# Patient Record
Sex: FEMALE | Race: WHITE | NOT HISPANIC OR LATINO | Employment: UNEMPLOYED | URBAN - METROPOLITAN AREA
[De-identification: names, ages, dates, MRNs, and addresses within clinical notes are randomized per-mention and may not be internally consistent; named-entity substitution may affect disease eponyms.]

---

## 2018-07-13 ENCOUNTER — HOSPITAL ENCOUNTER (INPATIENT)
Facility: HOSPITAL | Age: 63
LOS: 8 days | Discharge: HOME HEALTH | DRG: 356 | End: 2018-07-21
Attending: EMERGENCY MEDICINE | Admitting: HOSPITALIST
Payer: OTHER GOVERNMENT

## 2018-07-13 DIAGNOSIS — R06.02 SHORTNESS OF BREATH: ICD-10-CM

## 2018-07-13 DIAGNOSIS — I67.1 BRAIN ANEURYSM: ICD-10-CM

## 2018-07-13 DIAGNOSIS — E87.5 HYPERKALEMIA: ICD-10-CM

## 2018-07-13 DIAGNOSIS — M54.9 BACK PAIN: ICD-10-CM

## 2018-07-13 DIAGNOSIS — R53.83 LETHARGIC: ICD-10-CM

## 2018-07-13 DIAGNOSIS — D68.61 ANTIPHOSPHOLIPID SYNDROME: ICD-10-CM

## 2018-07-13 DIAGNOSIS — S37.019A PERINEPHRIC HEMATOMA: Primary | ICD-10-CM

## 2018-07-13 PROBLEM — E03.9 HYPOTHYROIDISM: Status: ACTIVE | Noted: 2018-07-13

## 2018-07-13 PROBLEM — R10.9 FLANK PAIN: Status: ACTIVE | Noted: 2018-07-13

## 2018-07-13 LAB
ABO + RH BLD: NORMAL
ALBUMIN SERPL BCP-MCNC: 2.5 G/DL
ALBUMIN SERPL BCP-MCNC: 2.6 G/DL
ALBUMIN SERPL BCP-MCNC: 2.9 G/DL
ALLENS TEST: ABNORMAL
ALP SERPL-CCNC: 82 U/L
ALP SERPL-CCNC: 86 U/L
ALP SERPL-CCNC: 94 U/L
ALT SERPL W/O P-5'-P-CCNC: 43 U/L
ALT SERPL W/O P-5'-P-CCNC: 56 U/L
ALT SERPL W/O P-5'-P-CCNC: 56 U/L
ANION GAP SERPL CALC-SCNC: 10 MMOL/L
ANION GAP SERPL CALC-SCNC: 12 MMOL/L
ANION GAP SERPL CALC-SCNC: 9 MMOL/L
APTT BLDCRRT: 56.8 SEC
AST SERPL-CCNC: 58 U/L
AST SERPL-CCNC: 80 U/L
AST SERPL-CCNC: 84 U/L
BACTERIA #/AREA URNS AUTO: ABNORMAL /HPF
BASOPHILS # BLD AUTO: 0.02 K/UL
BASOPHILS # BLD AUTO: 0.03 K/UL
BASOPHILS # BLD AUTO: 0.03 K/UL
BASOPHILS NFR BLD: 0.1 %
BASOPHILS NFR BLD: 0.1 %
BASOPHILS NFR BLD: 0.2 %
BILIRUB SERPL-MCNC: 1.1 MG/DL
BILIRUB SERPL-MCNC: 1.7 MG/DL
BILIRUB SERPL-MCNC: 1.7 MG/DL
BILIRUB UR QL STRIP: NEGATIVE
BLD GP AB SCN CELLS X3 SERPL QL: NORMAL
BLD PROD TYP BPU: NORMAL
BLOOD UNIT EXPIRATION DATE: NORMAL
BLOOD UNIT TYPE CODE: 5100
BLOOD UNIT TYPE CODE: 5100
BLOOD UNIT TYPE CODE: 600
BLOOD UNIT TYPE: NORMAL
BUN SERPL-MCNC: 21 MG/DL
BUN SERPL-MCNC: 23 MG/DL
BUN SERPL-MCNC: 25 MG/DL
CA-I BLDV-SCNC: 1.07 MMOL/L
CALCIUM SERPL-MCNC: 8 MG/DL
CALCIUM SERPL-MCNC: 8 MG/DL
CALCIUM SERPL-MCNC: 8.1 MG/DL
CHLORIDE SERPL-SCNC: 112 MMOL/L
CHLORIDE SERPL-SCNC: 113 MMOL/L
CHLORIDE SERPL-SCNC: 116 MMOL/L
CLARITY UR REFRACT.AUTO: ABNORMAL
CO2 SERPL-SCNC: 12 MMOL/L
CO2 SERPL-SCNC: 18 MMOL/L
CO2 SERPL-SCNC: 18 MMOL/L
CODING SYSTEM: NORMAL
COLOR UR AUTO: ABNORMAL
CREAT SERPL-MCNC: 1.2 MG/DL
CREAT SERPL-MCNC: 1.4 MG/DL
CREAT SERPL-MCNC: 1.6 MG/DL
DELSYS: ABNORMAL
DIFFERENTIAL METHOD: ABNORMAL
DISPENSE STATUS: NORMAL
EOSINOPHIL # BLD AUTO: 0 K/UL
EOSINOPHIL NFR BLD: 0 %
ERYTHROCYTE [DISTWIDTH] IN BLOOD BY AUTOMATED COUNT: 12.7 %
ERYTHROCYTE [DISTWIDTH] IN BLOOD BY AUTOMATED COUNT: 12.8 %
ERYTHROCYTE [DISTWIDTH] IN BLOOD BY AUTOMATED COUNT: 12.9 %
ERYTHROCYTE [SEDIMENTATION RATE] IN BLOOD BY WESTERGREN METHOD: 28 MM/H
EST. GFR  (AFRICAN AMERICAN): 39.8 ML/MIN/1.73 M^2
EST. GFR  (AFRICAN AMERICAN): 46.8 ML/MIN/1.73 M^2
EST. GFR  (AFRICAN AMERICAN): 56.4 ML/MIN/1.73 M^2
EST. GFR  (NON AFRICAN AMERICAN): 34.5 ML/MIN/1.73 M^2
EST. GFR  (NON AFRICAN AMERICAN): 40.6 ML/MIN/1.73 M^2
EST. GFR  (NON AFRICAN AMERICAN): 48.9 ML/MIN/1.73 M^2
ESTIMATED AVG GLUCOSE: 105 MG/DL
FIO2: 21
GLUCOSE SERPL-MCNC: 175 MG/DL
GLUCOSE SERPL-MCNC: 206 MG/DL
GLUCOSE SERPL-MCNC: 227 MG/DL
GLUCOSE UR QL STRIP: ABNORMAL
HBA1C MFR BLD HPLC: 5.3 %
HCO3 UR-SCNC: 13.4 MMOL/L (ref 24–28)
HCT VFR BLD AUTO: 29 %
HCT VFR BLD AUTO: 32.5 %
HCT VFR BLD AUTO: 35.1 %
HGB BLD-MCNC: 10.7 G/DL
HGB BLD-MCNC: 11 G/DL
HGB BLD-MCNC: 9.7 G/DL
HGB UR QL STRIP: ABNORMAL
HYALINE CASTS UR QL AUTO: 38 /LPF
IMM GRANULOCYTES # BLD AUTO: 0.09 K/UL
IMM GRANULOCYTES # BLD AUTO: 0.25 K/UL
IMM GRANULOCYTES # BLD AUTO: 0.27 K/UL
IMM GRANULOCYTES NFR BLD AUTO: 0.5 %
IMM GRANULOCYTES NFR BLD AUTO: 1.1 %
IMM GRANULOCYTES NFR BLD AUTO: 1.3 %
INR PPP: 2.7
INR PPP: 4.4
INR PPP: 5
KETONES UR QL STRIP: NEGATIVE
LDH SERPL L TO P-CCNC: 3.2 MMOL/L (ref 0.36–1.25)
LEUKOCYTE ESTERASE UR QL STRIP: NEGATIVE
LYMPHOCYTES # BLD AUTO: 1.7 K/UL
LYMPHOCYTES # BLD AUTO: 1.7 K/UL
LYMPHOCYTES # BLD AUTO: 1.9 K/UL
LYMPHOCYTES NFR BLD: 11.2 %
LYMPHOCYTES NFR BLD: 7.5 %
LYMPHOCYTES NFR BLD: 8.2 %
MAGNESIUM SERPL-MCNC: 1.8 MG/DL
MCH RBC QN AUTO: 28.6 PG
MCH RBC QN AUTO: 29.5 PG
MCH RBC QN AUTO: 29.5 PG
MCHC RBC AUTO-ENTMCNC: 31.3 G/DL
MCHC RBC AUTO-ENTMCNC: 32.9 G/DL
MCHC RBC AUTO-ENTMCNC: 33.4 G/DL
MCV RBC AUTO: 88 FL
MCV RBC AUTO: 90 FL
MCV RBC AUTO: 91 FL
MICROSCOPIC COMMENT: ABNORMAL
MODE: ABNORMAL
MONOCYTES # BLD AUTO: 1.1 K/UL
MONOCYTES # BLD AUTO: 1.2 K/UL
MONOCYTES # BLD AUTO: 2 K/UL
MONOCYTES NFR BLD: 5.6 %
MONOCYTES NFR BLD: 6.7 %
MONOCYTES NFR BLD: 9 %
NEUTROPHILS # BLD AUTO: 13.6 K/UL
NEUTROPHILS # BLD AUTO: 18 K/UL
NEUTROPHILS # BLD AUTO: 18.3 K/UL
NEUTROPHILS NFR BLD: 81.4 %
NEUTROPHILS NFR BLD: 82.3 %
NEUTROPHILS NFR BLD: 84.8 %
NITRITE UR QL STRIP: NEGATIVE
NRBC BLD-RTO: 0 /100 WBC
NUM UNITS TRANS FFP: NORMAL
NUM UNITS TRANS PACKED RBC: NORMAL
NUM UNITS TRANS PACKED RBC: NORMAL
PCO2 BLDA: 26 MMHG (ref 35–45)
PH SMN: 7.32 [PH] (ref 7.35–7.45)
PH UR STRIP: 5 [PH] (ref 5–8)
PHOSPHATE SERPL-MCNC: 3.5 MG/DL
PLATELET # BLD AUTO: 202 K/UL
PLATELET # BLD AUTO: 221 K/UL
PLATELET # BLD AUTO: 259 K/UL
PMV BLD AUTO: 11 FL
PMV BLD AUTO: 11.1 FL
PMV BLD AUTO: 11.3 FL
PO2 BLDA: 74 MMHG (ref 80–100)
POC BE: -13 MMOL/L
POC SATURATED O2: 94 % (ref 95–100)
POC TCO2: 14 MMOL/L (ref 23–27)
POCT GLUCOSE: 248 MG/DL (ref 70–110)
POTASSIUM SERPL-SCNC: 4.7 MMOL/L
POTASSIUM SERPL-SCNC: 5.4 MMOL/L
POTASSIUM SERPL-SCNC: 5.7 MMOL/L
PROT SERPL-MCNC: 5.3 G/DL
PROT SERPL-MCNC: 5.3 G/DL
PROT SERPL-MCNC: 6 G/DL
PROT UR QL STRIP: ABNORMAL
PROTHROMBIN TIME: 25.8 SEC
PROTHROMBIN TIME: 43 SEC
PROTHROMBIN TIME: 49.3 SEC
RBC # BLD AUTO: 3.29 M/UL
RBC # BLD AUTO: 3.63 M/UL
RBC # BLD AUTO: 3.84 M/UL
RBC #/AREA URNS AUTO: 6 /HPF (ref 0–4)
SAMPLE: ABNORMAL
SITE: ABNORMAL
SODIUM SERPL-SCNC: 139 MMOL/L
SODIUM SERPL-SCNC: 140 MMOL/L
SODIUM SERPL-SCNC: 141 MMOL/L
SP GR UR STRIP: 1.03 (ref 1–1.03)
SP02: 96
T4 FREE SERPL-MCNC: 1.05 NG/DL
TROPONIN I SERPL DL<=0.01 NG/ML-MCNC: <0.006 NG/ML
TSH SERPL DL<=0.005 MIU/L-ACNC: 0.13 UIU/ML
URN SPEC COLLECT METH UR: ABNORMAL
UROBILINOGEN UR STRIP-ACNC: NEGATIVE EU/DL
WBC # BLD AUTO: 16.68 K/UL
WBC # BLD AUTO: 21.25 K/UL
WBC # BLD AUTO: 22.17 K/UL
WBC #/AREA URNS AUTO: 2 /HPF (ref 0–5)

## 2018-07-13 PROCEDURE — 86920 COMPATIBILITY TEST SPIN: CPT

## 2018-07-13 PROCEDURE — 85025 COMPLETE CBC W/AUTO DIFF WBC: CPT

## 2018-07-13 PROCEDURE — 20000000 HC ICU ROOM

## 2018-07-13 PROCEDURE — 93005 ELECTROCARDIOGRAM TRACING: CPT

## 2018-07-13 PROCEDURE — 84443 ASSAY THYROID STIM HORMONE: CPT

## 2018-07-13 PROCEDURE — 94761 N-INVAS EAR/PLS OXIMETRY MLT: CPT

## 2018-07-13 PROCEDURE — 80053 COMPREHEN METABOLIC PANEL: CPT | Mod: 91

## 2018-07-13 PROCEDURE — 80048 BASIC METABOLIC PNL TOTAL CA: CPT

## 2018-07-13 PROCEDURE — 87040 BLOOD CULTURE FOR BACTERIA: CPT

## 2018-07-13 PROCEDURE — P9016 RBC LEUKOCYTES REDUCED: HCPCS

## 2018-07-13 PROCEDURE — 25000003 PHARM REV CODE 250: Performed by: STUDENT IN AN ORGANIZED HEALTH CARE EDUCATION/TRAINING PROGRAM

## 2018-07-13 PROCEDURE — 36600 WITHDRAWAL OF ARTERIAL BLOOD: CPT

## 2018-07-13 PROCEDURE — 82330 ASSAY OF CALCIUM: CPT

## 2018-07-13 PROCEDURE — 83735 ASSAY OF MAGNESIUM: CPT

## 2018-07-13 PROCEDURE — 83036 HEMOGLOBIN GLYCOSYLATED A1C: CPT

## 2018-07-13 PROCEDURE — 84100 ASSAY OF PHOSPHORUS: CPT

## 2018-07-13 PROCEDURE — 27000221 HC OXYGEN, UP TO 24 HOURS

## 2018-07-13 PROCEDURE — 99223 1ST HOSP IP/OBS HIGH 75: CPT | Mod: ,,, | Performed by: HOSPITALIST

## 2018-07-13 PROCEDURE — 63600175 PHARM REV CODE 636 W HCPCS: Performed by: STUDENT IN AN ORGANIZED HEALTH CARE EDUCATION/TRAINING PROGRAM

## 2018-07-13 PROCEDURE — 96374 THER/PROPH/DIAG INJ IV PUSH: CPT

## 2018-07-13 PROCEDURE — 63600175 PHARM REV CODE 636 W HCPCS: Performed by: EMERGENCY MEDICINE

## 2018-07-13 PROCEDURE — 84439 ASSAY OF FREE THYROXINE: CPT

## 2018-07-13 PROCEDURE — 82803 BLOOD GASES ANY COMBINATION: CPT

## 2018-07-13 PROCEDURE — 96375 TX/PRO/DX INJ NEW DRUG ADDON: CPT

## 2018-07-13 PROCEDURE — 81001 URINALYSIS AUTO W/SCOPE: CPT

## 2018-07-13 PROCEDURE — 93010 ELECTROCARDIOGRAM REPORT: CPT | Mod: 76,,, | Performed by: INTERNAL MEDICINE

## 2018-07-13 PROCEDURE — 85610 PROTHROMBIN TIME: CPT | Mod: 91

## 2018-07-13 PROCEDURE — P9017 PLASMA 1 DONOR FRZ W/IN 8 HR: HCPCS

## 2018-07-13 PROCEDURE — 84484 ASSAY OF TROPONIN QUANT: CPT

## 2018-07-13 PROCEDURE — 85610 PROTHROMBIN TIME: CPT

## 2018-07-13 PROCEDURE — 80053 COMPREHEN METABOLIC PANEL: CPT

## 2018-07-13 PROCEDURE — 85730 THROMBOPLASTIN TIME PARTIAL: CPT | Mod: 91

## 2018-07-13 PROCEDURE — 86850 RBC ANTIBODY SCREEN: CPT

## 2018-07-13 PROCEDURE — 85730 THROMBOPLASTIN TIME PARTIAL: CPT

## 2018-07-13 PROCEDURE — 99285 EMERGENCY DEPT VISIT HI MDM: CPT

## 2018-07-13 PROCEDURE — 36415 COLL VENOUS BLD VENIPUNCTURE: CPT

## 2018-07-13 PROCEDURE — 99285 EMERGENCY DEPT VISIT HI MDM: CPT | Mod: ,,, | Performed by: EMERGENCY MEDICINE

## 2018-07-13 PROCEDURE — 25000003 PHARM REV CODE 250: Performed by: HOSPITALIST

## 2018-07-13 PROCEDURE — 93010 ELECTROCARDIOGRAM REPORT: CPT | Mod: ,,, | Performed by: INTERNAL MEDICINE

## 2018-07-13 PROCEDURE — 36430 TRANSFUSION BLD/BLD COMPNT: CPT

## 2018-07-13 RX ORDER — HYDROCODONE BITARTRATE AND ACETAMINOPHEN 500; 5 MG/1; MG/1
TABLET ORAL
Status: DISCONTINUED | OUTPATIENT
Start: 2018-07-13 | End: 2018-07-16

## 2018-07-13 RX ORDER — MAGNESIUM SULFATE HEPTAHYDRATE 40 MG/ML
2 INJECTION, SOLUTION INTRAVENOUS
Status: DISCONTINUED | OUTPATIENT
Start: 2018-07-13 | End: 2018-07-21 | Stop reason: HOSPADM

## 2018-07-13 RX ORDER — POTASSIUM CHLORIDE 750 MG/1
10 CAPSULE, EXTENDED RELEASE ORAL ONCE
Status: DISCONTINUED | OUTPATIENT
Start: 2018-07-13 | End: 2018-07-13

## 2018-07-13 RX ORDER — AMOXICILLIN 250 MG
1 CAPSULE ORAL 2 TIMES DAILY
Status: DISCONTINUED | OUTPATIENT
Start: 2018-07-13 | End: 2018-07-13

## 2018-07-13 RX ORDER — IBUPROFEN 200 MG
24 TABLET ORAL
Status: DISCONTINUED | OUTPATIENT
Start: 2018-07-13 | End: 2018-07-21 | Stop reason: HOSPADM

## 2018-07-13 RX ORDER — PROCHLORPERAZINE EDISYLATE 5 MG/ML
10 INJECTION INTRAMUSCULAR; INTRAVENOUS EVERY 6 HOURS PRN
Status: DISCONTINUED | OUTPATIENT
Start: 2018-07-13 | End: 2018-07-20

## 2018-07-13 RX ORDER — DIPHENHYDRAMINE HYDROCHLORIDE 50 MG/ML
50 INJECTION INTRAMUSCULAR; INTRAVENOUS ONCE
Status: DISCONTINUED | OUTPATIENT
Start: 2018-07-13 | End: 2018-07-13

## 2018-07-13 RX ORDER — SODIUM CHLORIDE 9 MG/ML
1000 INJECTION, SOLUTION INTRAVENOUS
Status: COMPLETED | OUTPATIENT
Start: 2018-07-13 | End: 2018-07-13

## 2018-07-13 RX ORDER — ONDANSETRON 2 MG/ML
4 INJECTION INTRAMUSCULAR; INTRAVENOUS
Status: DISCONTINUED | OUTPATIENT
Start: 2018-07-13 | End: 2018-07-13

## 2018-07-13 RX ORDER — ONDANSETRON 2 MG/ML
4 INJECTION INTRAMUSCULAR; INTRAVENOUS EVERY 6 HOURS PRN
Status: DISCONTINUED | OUTPATIENT
Start: 2018-07-13 | End: 2018-07-17

## 2018-07-13 RX ORDER — SODIUM CHLORIDE 0.9 % (FLUSH) 0.9 %
5 SYRINGE (ML) INJECTION
Status: DISCONTINUED | OUTPATIENT
Start: 2018-07-13 | End: 2018-07-16

## 2018-07-13 RX ORDER — GLUCAGON 1 MG
1 KIT INJECTION
Status: DISCONTINUED | OUTPATIENT
Start: 2018-07-13 | End: 2018-07-21 | Stop reason: HOSPADM

## 2018-07-13 RX ORDER — NOREPINEPHRINE BITARTRATE/D5W 4MG/250ML
0.02 PLASTIC BAG, INJECTION (ML) INTRAVENOUS CONTINUOUS
Status: DISCONTINUED | OUTPATIENT
Start: 2018-07-13 | End: 2018-07-13

## 2018-07-13 RX ORDER — PROMETHAZINE HYDROCHLORIDE 25 MG/1
25 TABLET ORAL EVERY 6 HOURS PRN
Status: DISCONTINUED | OUTPATIENT
Start: 2018-07-13 | End: 2018-07-17

## 2018-07-13 RX ORDER — MORPHINE SULFATE 4 MG/ML
4 INJECTION, SOLUTION INTRAMUSCULAR; INTRAVENOUS
Status: COMPLETED | OUTPATIENT
Start: 2018-07-13 | End: 2018-07-13

## 2018-07-13 RX ORDER — MAGNESIUM SULFATE HEPTAHYDRATE 40 MG/ML
4 INJECTION, SOLUTION INTRAVENOUS
Status: DISCONTINUED | OUTPATIENT
Start: 2018-07-13 | End: 2018-07-21 | Stop reason: HOSPADM

## 2018-07-13 RX ORDER — HYDROCODONE BITARTRATE AND ACETAMINOPHEN 5; 325 MG/1; MG/1
1 TABLET ORAL EVERY 6 HOURS PRN
Status: DISCONTINUED | OUTPATIENT
Start: 2018-07-13 | End: 2018-07-13

## 2018-07-13 RX ORDER — METHYLPREDNISOLONE SOD SUCC 125 MG
40 VIAL (EA) INJECTION EVERY 4 HOURS
Status: DISCONTINUED | OUTPATIENT
Start: 2018-07-13 | End: 2018-07-13

## 2018-07-13 RX ORDER — SODIUM CHLORIDE 0.9 % (FLUSH) 0.9 %
3 SYRINGE (ML) INJECTION
Status: DISCONTINUED | OUTPATIENT
Start: 2018-07-13 | End: 2018-07-16

## 2018-07-13 RX ORDER — SODIUM CHLORIDE 9 MG/ML
INJECTION, SOLUTION INTRAVENOUS
Status: COMPLETED | OUTPATIENT
Start: 2018-07-13 | End: 2018-07-13

## 2018-07-13 RX ORDER — POTASSIUM CHLORIDE 20 MEQ/15ML
20 SOLUTION ORAL ONCE
Status: DISCONTINUED | OUTPATIENT
Start: 2018-07-13 | End: 2018-07-13

## 2018-07-13 RX ORDER — ACETAMINOPHEN 325 MG/1
650 TABLET ORAL EVERY 4 HOURS PRN
Status: DISCONTINUED | OUTPATIENT
Start: 2018-07-13 | End: 2018-07-21 | Stop reason: HOSPADM

## 2018-07-13 RX ORDER — ONDANSETRON 2 MG/ML
4 INJECTION INTRAMUSCULAR; INTRAVENOUS
Status: COMPLETED | OUTPATIENT
Start: 2018-07-13 | End: 2018-07-13

## 2018-07-13 RX ORDER — ONDANSETRON 8 MG/1
8 TABLET, ORALLY DISINTEGRATING ORAL EVERY 8 HOURS PRN
Status: DISCONTINUED | OUTPATIENT
Start: 2018-07-13 | End: 2018-07-21 | Stop reason: HOSPADM

## 2018-07-13 RX ORDER — POTASSIUM CHLORIDE 7.45 MG/ML
10 INJECTION INTRAVENOUS
Status: DISCONTINUED | OUTPATIENT
Start: 2018-07-13 | End: 2018-07-16

## 2018-07-13 RX ORDER — RAMELTEON 8 MG/1
8 TABLET ORAL NIGHTLY PRN
Status: DISCONTINUED | OUTPATIENT
Start: 2018-07-13 | End: 2018-07-21 | Stop reason: HOSPADM

## 2018-07-13 RX ORDER — PANTOPRAZOLE SODIUM 40 MG/10ML
40 INJECTION, POWDER, LYOPHILIZED, FOR SOLUTION INTRAVENOUS DAILY
Status: DISCONTINUED | OUTPATIENT
Start: 2018-07-14 | End: 2018-07-13

## 2018-07-13 RX ORDER — ONDANSETRON 8 MG/1
8 TABLET, ORALLY DISINTEGRATING ORAL EVERY 8 HOURS PRN
Status: DISCONTINUED | OUTPATIENT
Start: 2018-07-13 | End: 2018-07-13

## 2018-07-13 RX ORDER — IBUPROFEN 200 MG
16 TABLET ORAL
Status: DISCONTINUED | OUTPATIENT
Start: 2018-07-13 | End: 2018-07-21 | Stop reason: HOSPADM

## 2018-07-13 RX ORDER — POLYETHYLENE GLYCOL 3350 17 G/17G
17 POWDER, FOR SOLUTION ORAL DAILY
Status: DISCONTINUED | OUTPATIENT
Start: 2018-07-14 | End: 2018-07-13

## 2018-07-13 RX ADMIN — PIPERACILLIN AND TAZOBACTAM 4.5 G: 4; .5 INJECTION, POWDER, LYOPHILIZED, FOR SOLUTION INTRAVENOUS; PARENTERAL at 09:07

## 2018-07-13 RX ADMIN — SODIUM CHLORIDE 1000 ML: 0.9 INJECTION, SOLUTION INTRAVENOUS at 06:07

## 2018-07-13 RX ADMIN — MORPHINE SULFATE 4 MG: 4 INJECTION INTRAVENOUS at 02:07

## 2018-07-13 RX ADMIN — METHYLPREDNISOLONE SODIUM SUCCINATE 40 MG: 125 INJECTION, POWDER, FOR SOLUTION INTRAMUSCULAR; INTRAVENOUS at 04:07

## 2018-07-13 RX ADMIN — METHYLPREDNISOLONE SODIUM SUCCINATE 40 MG: 125 INJECTION, POWDER, FOR SOLUTION INTRAMUSCULAR; INTRAVENOUS at 06:07

## 2018-07-13 RX ADMIN — ONDANSETRON 4 MG: 2 INJECTION INTRAMUSCULAR; INTRAVENOUS at 10:07

## 2018-07-13 RX ADMIN — ONDANSETRON 8 MG: 8 TABLET, ORALLY DISINTEGRATING ORAL at 05:07

## 2018-07-13 RX ADMIN — ONDANSETRON 4 MG: 2 INJECTION INTRAMUSCULAR; INTRAVENOUS at 02:07

## 2018-07-13 RX ADMIN — DEXTROSE MONOHYDRATE 25 G: 25 INJECTION, SOLUTION INTRAVENOUS at 04:07

## 2018-07-13 RX ADMIN — VANCOMYCIN HYDROCHLORIDE 2500 MG: 1 INJECTION, POWDER, LYOPHILIZED, FOR SOLUTION INTRAVENOUS at 10:07

## 2018-07-13 RX ADMIN — INSULIN HUMAN 10 UNITS: 100 INJECTION, SOLUTION PARENTERAL at 04:07

## 2018-07-13 RX ADMIN — CALCIUM GLUCONATE 1 G: 98 INJECTION, SOLUTION INTRAVENOUS at 04:07

## 2018-07-13 RX ADMIN — PHYTONADIONE 10 MG: 10 INJECTION, EMULSION INTRAMUSCULAR; INTRAVENOUS; SUBCUTANEOUS at 04:07

## 2018-07-13 RX ADMIN — SODIUM CHLORIDE 1000 ML: 0.9 INJECTION, SOLUTION INTRAVENOUS at 02:07

## 2018-07-13 RX ADMIN — HYDROCODONE BITARTRATE AND ACETAMINOPHEN 1 TABLET: 5; 325 TABLET ORAL at 04:07

## 2018-07-13 NOTE — SUBJECTIVE & OBJECTIVE
Past Medical History:   Diagnosis Date    Antiphospholipid antibody syndrome     Brain aneurysm     Cancer     GERD (gastroesophageal reflux disease)     Migraine headache     Thyroid disease        Past Surgical History:   Procedure Laterality Date    CHOLECYSTECTOMY      HERNIA REPAIR      KNEE SURGERY      THYROID SURGERY         Review of patient's allergies indicates:   Allergen Reactions    Bactrim [sulfamethoxazole-trimethoprim]     Iodine and iodide containing products      According to pt's son, pt's neck swells up with iodine contrast exposure       Family History     None          Social History Main Topics    Smoking status: Not on file    Smokeless tobacco: Not on file    Alcohol use Not on file    Drug use: Unknown    Sexual activity: Not on file       Review of Systems   Constitutional: Positive for fatigue. Negative for activity change, appetite change, chills and fever.   HENT: Negative.    Eyes: Negative.    Respiratory: Negative for chest tightness and shortness of breath.    Cardiovascular: Negative for chest pain.   Gastrointestinal: Positive for abdominal pain. Negative for abdominal distention, nausea and vomiting.   Genitourinary: Positive for flank pain. Negative for difficulty urinating, dysuria and hematuria.   Skin: Negative.    Neurological: Negative.  Negative for dizziness.   Psychiatric/Behavioral: Negative.        Objective:     Temp:  [98.3 °F (36.8 °C)-98.7 °F (37.1 °C)] 98.7 °F (37.1 °C)  Pulse:  [] 104  Resp:  [14-16] 16  SpO2:  [97 %-100 %] 97 %  BP: (123-149)/(65-80) 123/80     There is no height or weight on file to calculate BMI.            Drains          No matching active lines, drains, or airways          Physical Exam   Constitutional: No distress.   HENT:   Head: Normocephalic and atraumatic.   Eyes: Conjunctivae are normal. No scleral icterus.   Neck: Normal range of motion.   Cardiovascular: Normal rate.    Pulmonary/Chest: Effort normal. No  respiratory distress.   Abdominal: Soft. She exhibits no distension. There is tenderness. There is no rebound and no guarding.   Right upper and lower quadrant tenderness  No left sided tenderness  Right CVAT  Cholecystectomy and hernia repair scars well healed   Musculoskeletal: Normal range of motion.   Neurological: She is alert.   Skin: Skin is warm and dry. She is not diaphoretic.     Psychiatric: She has a normal mood and affect.       Significant Labs:    BMP:    Recent Labs  Lab 07/13/18  1404      K 5.7*   *   CO2 18*   BUN 21   CREATININE 1.2   CALCIUM 8.0*       CBC:    Recent Labs  Lab 07/13/18  1404   WBC 16.68*   HGB 11.0*   HCT 35.1*          Coagulation:   Recent Labs  Lab 07/13/18  1404   INR 5.0*   APTT 56.8*       Significant Imaging:  CT: I have reviewed all results within the past 24 hours and my personal findings are:  Personal review of outside imaging: CTAP non-contrast study reveales moderate sized perinephric hematoma associated with the right kidney with associated fat stranding, no hydronephrosis bilaterally, no left renal abnormalities

## 2018-07-13 NOTE — NURSING
Received on floor from ER, given hydrocodone and zofran per Charge   per charge RN.  Pt npted to be pale and catarina[hpretic upon my arrival to room.  Unable top obtain manual blood pressure, c/o pain to flank area and rapis called.  Rapid response OSIEL Fox at Bedside OSIEL

## 2018-07-13 NOTE — ED TRIAGE NOTES
Patient presents from [facility] ER for bleeding right kidney. Patient has no trauma noted and presents to outside facility with abdominal pain/. Patient on 2nd unit of blood upon [facility]

## 2018-07-13 NOTE — CONSULTS
[facility]  Urology  Consult Note    Patient Name: Emiliana Persaud  MRN: [id number]  Admission Date: 7/13/2018  Hospital Length of Stay: 0   Code Status: Full Code   Attending Provider: Burton Sánchez MD   Consulting Provider: Nupur Vergara MD  Primary Care Physician: [facility]  Principal Problem:<principal problem not specified>    Inpatient consult to Urology  Consult performed by: NUPUR VERGARA  Consult ordered by: CAMEDN VILLEGAS    Inpatient consult to Urology  Consult performed by: NUPUR VERGARA  Consult ordered by: BURTON SÁNCHEZ          Subjective:     HPI:  61yo F with history of antiphospholipid syndrome on warfarin who was brought to the ED as a transfer from [facility] for perinephric hematoma. Per her family, she woke in the middle of the night with severe right flank and abdominal pain. In the ED she was found to have a perinephric hematoma and an INR of 2.5. She was transferred to [facility] ED for further evaluation. When she arrived to [facility] ED she had a Hgb of 11 and an INR of 5.0. Her vital signs were stable. She was currently receiving a 2nd unit of PRBCs in the ED.     She has no urologic surgical history. She has had a partial thyroidectomy, cholecystectomy with incisional hernia (s/p mesh repair) and radiation for an auditory nerve tumor. Her main complaints are fatigue and abdominal and flank pain.     Past Medical History:   Diagnosis Date    Antiphospholipid antibody syndrome     Brain aneurysm     Cancer     GERD (gastroesophageal reflux disease)     Migraine headache     Thyroid disease        Past Surgical History:   Procedure Laterality Date    CHOLECYSTECTOMY      HERNIA REPAIR      KNEE SURGERY      THYROID SURGERY         Review of patient's allergies indicates:   Allergen Reactions    Bactrim [sulfamethoxazole-trimethoprim]     Iodine and iodide containing products      According to pt's son, pt's neck swells up with  iodine contrast exposure       Family History     None          Social History Main Topics    Smoking status: Not on file    Smokeless tobacco: Not on file    Alcohol use Not on file    Drug use: Unknown    Sexual activity: Not on file       Review of Systems   Constitutional: Positive for fatigue. Negative for activity change, appetite change, chills and fever.   HENT: Negative.    Eyes: Negative.    Respiratory: Negative for chest tightness and shortness of breath.    Cardiovascular: Negative for chest pain.   Gastrointestinal: Positive for abdominal pain. Negative for abdominal distention, nausea and vomiting.   Genitourinary: Positive for flank pain. Negative for difficulty urinating, dysuria and hematuria.   Skin: Negative.    Neurological: Negative.  Negative for dizziness.   Psychiatric/Behavioral: Negative.        Objective:     Temp:  [98.3 °F (36.8 °C)-98.7 °F (37.1 °C)] 98.7 °F (37.1 °C)  Pulse:  [] 104  Resp:  [14-16] 16  SpO2:  [97 %-100 %] 97 %  BP: (123-149)/(65-80) 123/80     There is no height or weight on file to calculate BMI.            Drains          No matching active lines, drains, or airways          Physical Exam   Constitutional: No distress.   HENT:   Head: Normocephalic and atraumatic.   Eyes: Conjunctivae are normal. No scleral icterus.   Neck: Normal range of motion.   Cardiovascular: Normal rate.    Pulmonary/Chest: Effort normal. No respiratory distress.   Abdominal: Soft. She exhibits no distension. There is tenderness. There is no rebound and no guarding.   Right upper and lower quadrant tenderness  No left sided tenderness  Right CVAT  Cholecystectomy and hernia repair scars well healed   Musculoskeletal: Normal range of motion.   Neurological: She is alert.   Skin: Skin is warm and dry. She is not diaphoretic.     Psychiatric: She has a normal mood and affect.       Significant Labs:    BMP:    Recent Labs  Lab 07/13/18  1404      K 5.7*   *   CO2 18*   BUN  21   CREATININE 1.2   CALCIUM 8.0*       CBC:    Recent Labs  Lab 07/13/18  1404   WBC 16.68*   HGB 11.0*   HCT 35.1*          Coagulation:   Recent Labs  Lab 07/13/18  1404   INR 5.0*   APTT 56.8*       Significant Imaging:  CT: I have reviewed all results within the past 24 hours and my personal findings are:  Personal review of outside imaging: CTAP non-contrast study reveales moderate sized perinephric hematoma associated with the right kidney with associated fat stranding, no hydronephrosis bilaterally, no left renal abnormalities                    Assessment and Plan:     Perinephric hematoma    - Patient is hemodynamically stable and is currently receiving her second unit of PRBCs with a most recent Hgb of 11 and an INR of 5.0.  - Recommend q4hrs CBCc to monitor H/H.   - Recommend admission to medicine  - Correct INR   - recommend transfusing patient as needed   - Bed rest for patient  - Once INR corrected, start prophylactic heparin 5000u subQ every 8 hours, apply SCDs  - At this time would not recommend intervention. If patient fails to respond to blood transfusions and fluids and becomes hemodynamically unstable, would recommend intervention at that time.  - please call with questions or concerns.               VTE Risk Mitigation         Ordered     Place MARYJO hose  Until discontinued      07/13/18 1500     IP VTE LOW RISK PATIENT  Once      07/13/18 1500          Thank you for your consult. I will follow-up with patient. Please contact us if you have any additional questions.    Martinez Vergara MD  Urology  [facility]

## 2018-07-13 NOTE — ED NOTES
Patient identifiers have been checked and are correct.  Patient assisted to ED stretcher and placed in a hospital gown.     LOC: The patient is awake, alert, and aware of environment. The patient is oriented x 3 and speaking appropriately.   PSYCHOSOCIAL: Patient is calm and cooperative.   SKIN: The skin is warm, dry.   RESPIRATORY: Airway is open and patent. Bilateral chest rise and fall. Respirations are spontaneous, even and unlabored. Normal effort and rate noted. No accessory muscle use noted.   CARDIAC: Patient has a normal rate and rhythm.   ABDOMEN: Soft and non tender to palpation. No distention noted.   URINARY: Voids independently.   NEUROLOGIC: Eyes open spontaneously. Speech clear. Tolerating saliva secretions well. Able to follow commands.Symmetrical facial muscles. Moving all extremities. Movement is purposeful. .   MUSCULOSKELETAL: C/o right flank pain- palpation tenderness noted-OTC pain patch noted to site.     Side rails up x2. Call light within reach. Son at bedside. All updated on POC. Will continue to monitor.

## 2018-07-13 NOTE — HPI
Patient is a 61 yo F with significant past medical history of antiphospholipid syndrome, thyroid cancer, IBS, brain aneurysm, and chronic migraines who presents from [facility] ED in [place], [place] with severe right lower back and flank pain. The pain began last night at 11 pm. It progressively got worse. She did not try anything for the pain. Since last night, the pain has been constant. It radiates to the right abdomen. Patient's son states that she has not had this type of pain before. She endorses hematuria, HA, nausea, thirst, dry mouth. No PO intake since last night. She denies chest pain, SOB. CT at OSH showed perinephric hematoma. Outside labs show INR of 2.6, was 5 at [facility] ED. OSH labs wre WBC/10.9, H/H 13.1/37.6 with plt 234. CMP: calcium 8.1, Cr 0.8; glucose 145, Na 140, ALT 38, AST 30, CO2 25, K 4.6. One unit of blood was given in route to [facility]. Per patient's son, patient received morphine, dilaud, and phenergen at outside hospital. Completed 2 blood transfusions in [facility] ED. Patient lethargic after receiving morphine and Dilaud but awakens to verbal stimuli.     Speaking with patient and patient's son, patient is taking warfarin, synthroid, endaril, protonix, and lipitor. Unsure of dosing and frequency. Records from outside hospital may help to clarify home medications.

## 2018-07-13 NOTE — ASSESSMENT & PLAN NOTE
- Patient is hemodynamically stable and is currently receiving her second unit of PRBCs with a most recent Hgb of 11 and an INR of 5.0.  - Recommend q4hrs CBCc to monitor H/H.   - Recommend admission to medicine  - Correct INR   - recommend transfusing patient as needed   - Bed rest for patient  - Once INR corrected, start prophylactic heparin 5000u subQ every 8 hours, apply SCDs  - At this time would not recommend intervention. If patient fails to respond to blood transfusions and fluids and becomes hemodynamically unstable, would recommend intervention at that time.  - please call with questions or concerns.

## 2018-07-13 NOTE — HPI
63yo F with history of antiphospholipid syndrome on warfarin who was brought to the ED as a transfer from [facility] for perinephric hematoma. Per her family, she woke in the middle of the night with severe right flank and abdominal pain. In the ED she was found to have a perinephric hematoma and an INR of 2.5. She was transferred to [facility] ED for further evaluation. When she arrived to [facility] ED she had a Hgb of 11 and an INR of 5.0. Her vital signs were stable. She was currently receiving a 2nd unit of PRBCs in the ED.     She has no urologic surgical history. She has had a partial thyroidectomy, cholecystectomy with incisional hernia (s/p mesh repair) and radiation for an auditory nerve tumor. Her main complaints are fatigue and abdominal and flank pain.

## 2018-07-14 PROBLEM — N17.9 AKI (ACUTE KIDNEY INJURY): Status: ACTIVE | Noted: 2018-07-14

## 2018-07-14 PROBLEM — I67.1 BRAIN ANEURYSM: Status: ACTIVE | Noted: 2018-07-14

## 2018-07-14 LAB
ALBUMIN SERPL BCP-MCNC: 2.9 G/DL
ALBUMIN SERPL BCP-MCNC: 3 G/DL
ALP SERPL-CCNC: 77 U/L
ALP SERPL-CCNC: 82 U/L
ALT SERPL W/O P-5'-P-CCNC: 118 U/L
ALT SERPL W/O P-5'-P-CCNC: 203 U/L
ANION GAP SERPL CALC-SCNC: 10 MMOL/L
ANION GAP SERPL CALC-SCNC: 10 MMOL/L
ANION GAP SERPL CALC-SCNC: 12 MMOL/L
ANISOCYTOSIS BLD QL SMEAR: SLIGHT
APTT BLDCRRT: 38.5 SEC
APTT BLDCRRT: 40.5 SEC
APTT BLDCRRT: 40.9 SEC
APTT BLDCRRT: 41.2 SEC
APTT BLDCRRT: 42.7 SEC
APTT BLDCRRT: 46.4 SEC
AST SERPL-CCNC: 191 U/L
AST SERPL-CCNC: 263 U/L
BASOPHILS # BLD AUTO: 0.02 K/UL
BASOPHILS # BLD AUTO: 0.03 K/UL
BASOPHILS # BLD AUTO: 0.03 K/UL
BASOPHILS NFR BLD: 0.1 %
BASOPHILS NFR BLD: 0.2 %
BILIRUB SERPL-MCNC: 1.8 MG/DL
BILIRUB SERPL-MCNC: 1.9 MG/DL
BUN SERPL-MCNC: 27 MG/DL
BUN SERPL-MCNC: 28 MG/DL
BUN SERPL-MCNC: 37 MG/DL
CALCIUM SERPL-MCNC: 8.2 MG/DL
CALCIUM SERPL-MCNC: 8.4 MG/DL
CALCIUM SERPL-MCNC: 8.7 MG/DL
CHLORIDE SERPL-SCNC: 110 MMOL/L
CHLORIDE SERPL-SCNC: 111 MMOL/L
CHLORIDE SERPL-SCNC: 112 MMOL/L
CO2 SERPL-SCNC: 16 MMOL/L
CO2 SERPL-SCNC: 18 MMOL/L
CO2 SERPL-SCNC: 20 MMOL/L
CREAT SERPL-MCNC: 1.6 MG/DL
CREAT SERPL-MCNC: 1.6 MG/DL
CREAT SERPL-MCNC: 1.8 MG/DL
CREAT UR-MCNC: 184 MG/DL
CREAT UR-MCNC: 184 MG/DL
DIFFERENTIAL METHOD: ABNORMAL
EOSINOPHIL # BLD AUTO: 0 K/UL
EOSINOPHIL NFR BLD: 0 %
EOSINOPHIL URNS QL WRIGHT STN: NORMAL
ERYTHROCYTE [DISTWIDTH] IN BLOOD BY AUTOMATED COUNT: 13.7 %
ERYTHROCYTE [DISTWIDTH] IN BLOOD BY AUTOMATED COUNT: 13.8 %
ERYTHROCYTE [DISTWIDTH] IN BLOOD BY AUTOMATED COUNT: 13.9 %
ERYTHROCYTE [DISTWIDTH] IN BLOOD BY AUTOMATED COUNT: 14 %
EST. GFR  (AFRICAN AMERICAN): 34.5 ML/MIN/1.73 M^2
EST. GFR  (AFRICAN AMERICAN): 39.8 ML/MIN/1.73 M^2
EST. GFR  (AFRICAN AMERICAN): 39.8 ML/MIN/1.73 M^2
EST. GFR  (NON AFRICAN AMERICAN): 29.9 ML/MIN/1.73 M^2
EST. GFR  (NON AFRICAN AMERICAN): 34.5 ML/MIN/1.73 M^2
EST. GFR  (NON AFRICAN AMERICAN): 34.5 ML/MIN/1.73 M^2
GLUCOSE SERPL-MCNC: 125 MG/DL
GLUCOSE SERPL-MCNC: 180 MG/DL
GLUCOSE SERPL-MCNC: 192 MG/DL
HCT VFR BLD AUTO: 24.9 %
HCT VFR BLD AUTO: 26.2 %
HCT VFR BLD AUTO: 28.2 %
HCT VFR BLD AUTO: 29.5 %
HCT VFR BLD AUTO: 30.7 %
HCT VFR BLD AUTO: 31.5 %
HGB BLD-MCNC: 10.5 G/DL
HGB BLD-MCNC: 10.6 G/DL
HGB BLD-MCNC: 8.8 G/DL
HGB BLD-MCNC: 8.8 G/DL
HGB BLD-MCNC: 9.4 G/DL
HGB BLD-MCNC: 9.7 G/DL
IMM GRANULOCYTES # BLD AUTO: 0.14 K/UL
IMM GRANULOCYTES # BLD AUTO: 0.14 K/UL
IMM GRANULOCYTES # BLD AUTO: 0.15 K/UL
IMM GRANULOCYTES # BLD AUTO: 0.19 K/UL
IMM GRANULOCYTES # BLD AUTO: 0.19 K/UL
IMM GRANULOCYTES # BLD AUTO: 0.2 K/UL
IMM GRANULOCYTES NFR BLD AUTO: 0.7 %
IMM GRANULOCYTES NFR BLD AUTO: 0.7 %
IMM GRANULOCYTES NFR BLD AUTO: 0.8 %
IMM GRANULOCYTES NFR BLD AUTO: 0.9 %
INR PPP: 1.3
INR PPP: 1.4
INR PPP: 1.4
INR PPP: 1.6
LACTATE SERPL-SCNC: 2.7 MMOL/L
LYMPHOCYTES # BLD AUTO: 1.6 K/UL
LYMPHOCYTES # BLD AUTO: 1.7 K/UL
LYMPHOCYTES # BLD AUTO: 1.7 K/UL
LYMPHOCYTES # BLD AUTO: 1.9 K/UL
LYMPHOCYTES # BLD AUTO: 1.9 K/UL
LYMPHOCYTES # BLD AUTO: 2.1 K/UL
LYMPHOCYTES NFR BLD: 10.5 %
LYMPHOCYTES NFR BLD: 7.4 %
LYMPHOCYTES NFR BLD: 7.7 %
LYMPHOCYTES NFR BLD: 8 %
LYMPHOCYTES NFR BLD: 9 %
LYMPHOCYTES NFR BLD: 9.1 %
MAGNESIUM SERPL-MCNC: 1.8 MG/DL
MAGNESIUM SERPL-MCNC: 2 MG/DL
MAGNESIUM SERPL-MCNC: 2.2 MG/DL
MCH RBC QN AUTO: 29.4 PG
MCH RBC QN AUTO: 29.4 PG
MCH RBC QN AUTO: 29.5 PG
MCH RBC QN AUTO: 29.7 PG
MCH RBC QN AUTO: 29.9 PG
MCH RBC QN AUTO: 30.9 PG
MCHC RBC AUTO-ENTMCNC: 32.9 G/DL
MCHC RBC AUTO-ENTMCNC: 33.3 G/DL
MCHC RBC AUTO-ENTMCNC: 33.3 G/DL
MCHC RBC AUTO-ENTMCNC: 33.6 G/DL
MCHC RBC AUTO-ENTMCNC: 34.5 G/DL
MCHC RBC AUTO-ENTMCNC: 35.3 G/DL
MCV RBC AUTO: 87 FL
MCV RBC AUTO: 87 FL
MCV RBC AUTO: 88 FL
MCV RBC AUTO: 88 FL
MCV RBC AUTO: 89 FL
MCV RBC AUTO: 89 FL
MONOCYTES # BLD AUTO: 0.8 K/UL
MONOCYTES # BLD AUTO: 1.3 K/UL
MONOCYTES # BLD AUTO: 1.8 K/UL
MONOCYTES # BLD AUTO: 2.5 K/UL
MONOCYTES # BLD AUTO: 2.8 K/UL
MONOCYTES # BLD AUTO: 2.9 K/UL
MONOCYTES NFR BLD: 10.6 %
MONOCYTES NFR BLD: 12.4 %
MONOCYTES NFR BLD: 13.1 %
MONOCYTES NFR BLD: 4.5 %
MONOCYTES NFR BLD: 6.3 %
MONOCYTES NFR BLD: 8.3 %
NEUTROPHILS # BLD AUTO: 15.1 K/UL
NEUTROPHILS # BLD AUTO: 16.7 K/UL
NEUTROPHILS # BLD AUTO: 17.1 K/UL
NEUTROPHILS # BLD AUTO: 17.5 K/UL
NEUTROPHILS # BLD AUTO: 17.8 K/UL
NEUTROPHILS # BLD AUTO: 18.7 K/UL
NEUTROPHILS NFR BLD: 78.2 %
NEUTROPHILS NFR BLD: 79.3 %
NEUTROPHILS NFR BLD: 80.5 %
NEUTROPHILS NFR BLD: 81.9 %
NEUTROPHILS NFR BLD: 82.4 %
NEUTROPHILS NFR BLD: 85.4 %
NRBC BLD-RTO: 0 /100 WBC
PHOSPHATE SERPL-MCNC: 3.4 MG/DL
PHOSPHATE SERPL-MCNC: 4 MG/DL
PHOSPHATE SERPL-MCNC: 4.3 MG/DL
PLATELET # BLD AUTO: 157 K/UL
PLATELET # BLD AUTO: 158 K/UL
PLATELET # BLD AUTO: 162 K/UL
PLATELET # BLD AUTO: 171 K/UL
PLATELET # BLD AUTO: 173 K/UL
PLATELET # BLD AUTO: 189 K/UL
PLATELET BLD QL SMEAR: ABNORMAL
PMV BLD AUTO: 11.2 FL
PMV BLD AUTO: 11.3 FL
PMV BLD AUTO: 11.3 FL
PMV BLD AUTO: 11.4 FL
PMV BLD AUTO: 11.4 FL
PMV BLD AUTO: 11.8 FL
POTASSIUM SERPL-SCNC: 4.5 MMOL/L
POTASSIUM SERPL-SCNC: 4.6 MMOL/L
POTASSIUM SERPL-SCNC: 4.8 MMOL/L
PROT SERPL-MCNC: 6 G/DL
PROT SERPL-MCNC: 6.2 G/DL
PROT UR-MCNC: 85 MG/DL
PROT/CREAT RATIO, UR: 0.46
PROTHROMBIN TIME: 12.7 SEC
PROTHROMBIN TIME: 12.8 SEC
PROTHROMBIN TIME: 13.5 SEC
PROTHROMBIN TIME: 13.8 SEC
PROTHROMBIN TIME: 14.2 SEC
PROTHROMBIN TIME: 16 SEC
RBC # BLD AUTO: 2.85 M/UL
RBC # BLD AUTO: 2.96 M/UL
RBC # BLD AUTO: 3.19 M/UL
RBC # BLD AUTO: 3.3 M/UL
RBC # BLD AUTO: 3.55 M/UL
RBC # BLD AUTO: 3.57 M/UL
SODIUM SERPL-SCNC: 138 MMOL/L
SODIUM SERPL-SCNC: 139 MMOL/L
SODIUM SERPL-SCNC: 142 MMOL/L
SODIUM UR-SCNC: 24 MMOL/L
VANCOMYCIN SERPL-MCNC: 35.9 UG/ML
WBC # BLD AUTO: 17.64 K/UL
WBC # BLD AUTO: 20.28 K/UL
WBC # BLD AUTO: 21.4 K/UL
WBC # BLD AUTO: 21.84 K/UL
WBC # BLD AUTO: 22.42 K/UL
WBC # BLD AUTO: 23.21 K/UL

## 2018-07-14 PROCEDURE — 99291 CRITICAL CARE FIRST HOUR: CPT | Mod: ,,, | Performed by: INTERNAL MEDICINE

## 2018-07-14 PROCEDURE — 87205 SMEAR GRAM STAIN: CPT

## 2018-07-14 PROCEDURE — 25000003 PHARM REV CODE 250: Performed by: STUDENT IN AN ORGANIZED HEALTH CARE EDUCATION/TRAINING PROGRAM

## 2018-07-14 PROCEDURE — 25000003 PHARM REV CODE 250: Performed by: HOSPITALIST

## 2018-07-14 PROCEDURE — G8988 SELF CARE GOAL STATUS: HCPCS | Mod: CK

## 2018-07-14 PROCEDURE — 80053 COMPREHEN METABOLIC PANEL: CPT

## 2018-07-14 PROCEDURE — 63600175 PHARM REV CODE 636 W HCPCS: Performed by: STUDENT IN AN ORGANIZED HEALTH CARE EDUCATION/TRAINING PROGRAM

## 2018-07-14 PROCEDURE — 20000000 HC ICU ROOM

## 2018-07-14 PROCEDURE — C9113 INJ PANTOPRAZOLE SODIUM, VIA: HCPCS | Performed by: HOSPITALIST

## 2018-07-14 PROCEDURE — 85610 PROTHROMBIN TIME: CPT | Mod: 91

## 2018-07-14 PROCEDURE — 83735 ASSAY OF MAGNESIUM: CPT

## 2018-07-14 PROCEDURE — 27000221 HC OXYGEN, UP TO 24 HOURS

## 2018-07-14 PROCEDURE — 36415 COLL VENOUS BLD VENIPUNCTURE: CPT

## 2018-07-14 PROCEDURE — 85730 THROMBOPLASTIN TIME PARTIAL: CPT | Mod: 91

## 2018-07-14 PROCEDURE — 85730 THROMBOPLASTIN TIME PARTIAL: CPT

## 2018-07-14 PROCEDURE — 97165 OT EVAL LOW COMPLEX 30 MIN: CPT

## 2018-07-14 PROCEDURE — 85610 PROTHROMBIN TIME: CPT

## 2018-07-14 PROCEDURE — 63600175 PHARM REV CODE 636 W HCPCS: Performed by: NURSE PRACTITIONER

## 2018-07-14 PROCEDURE — 84156 ASSAY OF PROTEIN URINE: CPT

## 2018-07-14 PROCEDURE — 80053 COMPREHEN METABOLIC PANEL: CPT | Mod: 91

## 2018-07-14 PROCEDURE — 25000003 PHARM REV CODE 250: Performed by: NURSE PRACTITIONER

## 2018-07-14 PROCEDURE — 63600175 PHARM REV CODE 636 W HCPCS: Performed by: HOSPITALIST

## 2018-07-14 PROCEDURE — 84100 ASSAY OF PHOSPHORUS: CPT | Mod: 91

## 2018-07-14 PROCEDURE — 83605 ASSAY OF LACTIC ACID: CPT

## 2018-07-14 PROCEDURE — 80202 ASSAY OF VANCOMYCIN: CPT

## 2018-07-14 PROCEDURE — 85025 COMPLETE CBC W/AUTO DIFF WBC: CPT | Mod: 91

## 2018-07-14 PROCEDURE — 83735 ASSAY OF MAGNESIUM: CPT | Mod: 91

## 2018-07-14 PROCEDURE — 84300 ASSAY OF URINE SODIUM: CPT

## 2018-07-14 PROCEDURE — 84100 ASSAY OF PHOSPHORUS: CPT

## 2018-07-14 PROCEDURE — G8987 SELF CARE CURRENT STATUS: HCPCS | Mod: CL

## 2018-07-14 RX ORDER — METOPROLOL TARTRATE 1 MG/ML
5 INJECTION, SOLUTION INTRAVENOUS ONCE
Status: COMPLETED | OUTPATIENT
Start: 2018-07-14 | End: 2018-07-14

## 2018-07-14 RX ORDER — LEVOTHYROXINE SODIUM 75 UG/1
150 TABLET ORAL
Status: DISCONTINUED | OUTPATIENT
Start: 2018-07-14 | End: 2018-07-21 | Stop reason: HOSPADM

## 2018-07-14 RX ORDER — SODIUM CHLORIDE, SODIUM LACTATE, POTASSIUM CHLORIDE, CALCIUM CHLORIDE 600; 310; 30; 20 MG/100ML; MG/100ML; MG/100ML; MG/100ML
INJECTION, SOLUTION INTRAVENOUS ONCE
Status: COMPLETED | OUTPATIENT
Start: 2018-07-14 | End: 2018-07-14

## 2018-07-14 RX ORDER — FENTANYL CITRATE 50 UG/ML
25 INJECTION, SOLUTION INTRAMUSCULAR; INTRAVENOUS EVERY 6 HOURS PRN
Status: DISCONTINUED | OUTPATIENT
Start: 2018-07-14 | End: 2018-07-15

## 2018-07-14 RX ORDER — FENTANYL CITRATE 50 UG/ML
25 INJECTION, SOLUTION INTRAMUSCULAR; INTRAVENOUS ONCE
Status: COMPLETED | OUTPATIENT
Start: 2018-07-14 | End: 2018-07-14

## 2018-07-14 RX ADMIN — MAGNESIUM SULFATE IN WATER 2 G: 40 INJECTION, SOLUTION INTRAVENOUS at 12:07

## 2018-07-14 RX ADMIN — SODIUM CHLORIDE, SODIUM LACTATE, POTASSIUM CHLORIDE, AND CALCIUM CHLORIDE 500 ML: .6; .31; .03; .02 INJECTION, SOLUTION INTRAVENOUS at 03:07

## 2018-07-14 RX ADMIN — LEVOTHYROXINE SODIUM 150 MCG: 75 TABLET ORAL at 05:07

## 2018-07-14 RX ADMIN — FENTANYL CITRATE 25 MCG: 50 INJECTION INTRAMUSCULAR; INTRAVENOUS at 07:07

## 2018-07-14 RX ADMIN — DEXTROSE 40 MG: 50 INJECTION, SOLUTION INTRAVENOUS at 08:07

## 2018-07-14 RX ADMIN — PIPERACILLIN AND TAZOBACTAM 4.5 G: 4; .5 INJECTION, POWDER, LYOPHILIZED, FOR SOLUTION INTRAVENOUS; PARENTERAL at 03:07

## 2018-07-14 RX ADMIN — METOPROLOL TARTRATE 5 MG: 1 INJECTION, SOLUTION INTRAVENOUS at 02:07

## 2018-07-14 RX ADMIN — PIPERACILLIN AND TAZOBACTAM 4.5 G: 4; .5 INJECTION, POWDER, LYOPHILIZED, FOR SOLUTION INTRAVENOUS; PARENTERAL at 08:07

## 2018-07-14 RX ADMIN — PROCHLORPERAZINE EDISYLATE 10 MG: 5 INJECTION INTRAMUSCULAR; INTRAVENOUS at 02:07

## 2018-07-14 RX ADMIN — SODIUM CHLORIDE, SODIUM LACTATE, POTASSIUM CHLORIDE, AND CALCIUM CHLORIDE: .6; .31; .03; .02 INJECTION, SOLUTION INTRAVENOUS at 07:07

## 2018-07-14 RX ADMIN — ONDANSETRON 4 MG: 2 INJECTION INTRAMUSCULAR; INTRAVENOUS at 10:07

## 2018-07-14 RX ADMIN — FENTANYL CITRATE 25 MCG: 50 INJECTION INTRAMUSCULAR; INTRAVENOUS at 03:07

## 2018-07-14 RX ADMIN — PIPERACILLIN AND TAZOBACTAM 4.5 G: 4; .5 INJECTION, POWDER, LYOPHILIZED, FOR SOLUTION INTRAVENOUS; PARENTERAL at 11:07

## 2018-07-14 RX ADMIN — FENTANYL CITRATE 25 MCG: 50 INJECTION INTRAMUSCULAR; INTRAVENOUS at 11:07

## 2018-07-14 RX ADMIN — VANCOMYCIN HYDROCHLORIDE 1500 MG: 10 INJECTION, POWDER, LYOPHILIZED, FOR SOLUTION INTRAVENOUS at 07:07

## 2018-07-14 NOTE — ASSESSMENT & PLAN NOTE
-patient is s/p thyroid CA and had a thyroidectomy  -she takes synthroid daily but unsure of her Dose

## 2018-07-14 NOTE — PROGRESS NOTES
Urology    62 YOF with antiphospholipid syndrome who presented with spontaneous right subcapsular renal hematoma and large retroperitoneal hematoma diagnosed on outside CT scan.  Patients INR is now back down to a reasonable level, and, although hemoglobin continues to trend down slightly, this is a dramatic improvement and will likely continue to stabilize.  On review of the non-contrast CT scan ordered today, it appears that the hematoma has filled the christopher-renal space within Gerota's fascia, and will likely tamponade the bleeding vessel, allowing it to clot off.  She also has an MAURO, and has had marginal urine output.  Her blood cultures are no growth to date, and her UA was not overly concerning for infection; at any rate, no urine culture was sent.  A chew catheter was placed, presumably for monitoring intake and output, as she was previously voiding spontaneously.        Recommendations:   - remove chew catheter ASAP-- patient not in retention, can use bedpan-- no reason to justify the CAUTI risk   - discontinue antibiotics as there is no identifiable infectious etiology   - avoid nephrotoxic agents   - atelectasis and pleural effusion-- recommend incentive spirometry as well as adequate pain control   - please fluid resuscitate this patient with no significant cardiac history who is NPO and has tachycardia, low UOP, MAURO, etc., especially given the possibility of requiring a contrasted CT scan and/ or angiogram, which will cause further insult to her kidneys  - we do not see the benefit of general surgery consultation at this time   - continue conservative management including bedrest, keep ahead at least 2 U PRBC prepared, transfuse PRBC PRN, maintain INR at normal level  - do not send patient for further imaging or intervention regarding her perinephric and retroperitoneal hematoma without discussing with urology     Giovani Skaggs MD, PGY-IV  [facility]

## 2018-07-14 NOTE — HPI
Patient is a 63 yo F with significant past medical history of antiphospholipid syndrome, thyroid cancer, IBS, brain aneurysm, and chronic migraines who presents from [facility] ED in [place], [place] with concerns of a perinephric hematoma.    Patient states that the pain began 7/12 at 11 pm. It progressively got worse. She did not try anything for the pain. The pain has been constant. It radiates to the right abdomen. Patient endorsed hematuria, HA, nausea, thirst, dry mouth. he denies chest pain, SOB. CT at OSH showed perinephric hematoma. Outside labs show INR of 2.6.  OSH labs wre WBC/10.9, H/H 13.1/37.6  One unit of blood was given in route to [facility]. Patient received morphine, dilaudid, and phenergen at outside hospital.     Patient completed 2 blood transfusions in [facility] ED.  At that time, patient was admitted to hospital medicine.  Hg was 10.7 s/p 2 U.  Urology and IR consulted for retroperitoneal bleed.  They stated that there was intervention needed at this time.  In the evening of 7/13, code response team was called due to patient feeling lethargic, weak, and diaphoretic. Team went to see patient. Extremely lethargic but easily aroused.  Oriented X4 with no motor deficits noted.  BS performed revealed a BS of 258.   BP 94/65 RR 12 O2 94% on RA.  Placed on 2L NC.  Labs including POCT glucose, ABG, lactic acid, CMP, CBC, PT/INR were ordered. Blood pressure started dropping: SBP running in the mid 90s and DBP in the mid 40s-low 50s. Bolus of saline and FFP were ordered and administered.     Consulted ICU for hypotension.

## 2018-07-14 NOTE — SUBJECTIVE & OBJECTIVE
Past Medical History:   Diagnosis Date    Antiphospholipid antibody syndrome     Brain aneurysm     Cancer     GERD (gastroesophageal reflux disease)     Migraine headache     Thyroid disease        Past Surgical History:   Procedure Laterality Date    CHOLECYSTECTOMY      HERNIA REPAIR      KNEE SURGERY      THYROID SURGERY         Review of patient's allergies indicates:   Allergen Reactions    Bactrim [sulfamethoxazole-trimethoprim]     Iodine and iodide containing products      According to pt's son, pt's neck swells up with iodine contrast exposure       Family History     None        Social History Main Topics    Smoking status: Never Smoker    Smokeless tobacco: Not on file    Alcohol use Not on file    Drug use: Unknown    Sexual activity: Not on file      Review of Systems   Constitutional: Negative for activity change, appetite change, chills, fatigue, fever and unexpected weight change.   HENT: Negative for congestion, ear discharge and rhinorrhea.    Eyes: Negative for photophobia, redness and visual disturbance.   Respiratory: Negative for apnea, cough, choking, shortness of breath and wheezing.    Cardiovascular: Negative for chest pain, palpitations and leg swelling.   Gastrointestinal: Positive for abdominal pain and nausea. Negative for abdominal distention, constipation and vomiting.   Endocrine: Negative for polydipsia, polyphagia and polyuria.   Genitourinary: Positive for flank pain and hematuria. Negative for dysuria and frequency.   Musculoskeletal: Positive for back pain. Negative for arthralgias, gait problem, joint swelling and myalgias.   Skin: Negative for color change, pallor, rash and wound.   Allergic/Immunologic: Negative for immunocompromised state.   Neurological: Negative for dizziness, seizures, speech difficulty, weakness, numbness and headaches.   Hematological: Negative for adenopathy. Does not bruise/bleed easily.   Psychiatric/Behavioral: Negative for  agitation, behavioral problems, confusion and decreased concentration.   All other systems reviewed and are negative.    Objective:     Vital Signs (Most Recent):  Temp: 98.5 °F (36.9 °C) (07/13/18 2300)  Pulse: 102 (07/14/18 0100)  Resp: (!) 26 (07/14/18 0100)  BP: 129/75 (07/14/18 0100)  SpO2: (!) 93 % (07/14/18 0100) Vital Signs (24h Range):  Temp:  [97.5 °F (36.4 °C)-98.7 °F (37.1 °C)] 98.5 °F (36.9 °C)  Pulse:  [] 102  Resp:  [14-30] 26  SpO2:  [93 %-100 %] 93 %  BP: ()/(45-94) 129/75   Weight: 104 kg (229 lb 4.5 oz)  Body mass index is 34.86 kg/m².      Intake/Output Summary (Last 24 hours) at 07/14/18 0128  Last data filed at 07/14/18 0100   Gross per 24 hour   Intake             1602 ml   Output              230 ml   Net             1372 ml       Physical Exam   Constitutional: She is oriented to person, place, and time. She appears well-developed and well-nourished. No distress.   HENT:   Head: Normocephalic and atraumatic.   Eyes: EOM are normal. Pupils are equal, round, and reactive to light. Right eye exhibits no discharge. Left eye exhibits no discharge.   Neck: Normal range of motion. Neck supple. No JVD present.   Cardiovascular: Normal rate, regular rhythm and normal heart sounds.  Exam reveals no gallop and no friction rub.    No murmur heard.  Pulmonary/Chest: Effort normal and breath sounds normal. No respiratory distress. She has no wheezes.   Abdominal: Soft. Bowel sounds are normal. She exhibits no distension and no mass. There is no tenderness. There is no guarding.   Tenderness in RUQ, flank tenderness   Musculoskeletal: Normal range of motion. She exhibits tenderness. She exhibits no edema or deformity.   No bruising or discoloration   Neurological: She is alert and oriented to person, place, and time.   Skin: Skin is warm and dry. She is not diaphoretic. No erythema. No pallor.   Nursing note and vitals reviewed.      Vents:  Oxygen Concentration (%): 28 (07/13/18  1956)  Lines/Drains/Airways     Drain                 Urethral Catheter 07/13/18 2230 Latex less than 1 day          Peripheral Intravenous Line                 Peripheral IV - Single Lumen 07/13/18 1353 Right Forearm less than 1 day         Peripheral IV - Single Lumen 07/13/18 1354 Left Antecubital less than 1 day         Peripheral IV - Single Lumen 07/13/18 1831 Right Hand less than 1 day         Peripheral IV - Single Lumen 07/13/18 1902 Right Antecubital less than 1 day              Significant Labs:    CBC/Anemia Profile:    Recent Labs  Lab 07/13/18  1831 07/13/18 2000 07/13/18  2345   WBC 21.25* 22.17* 17.64*   HGB 10.7* 9.7* 10.5*   HCT 32.5* 29.0* 31.5*    202 162   MCV 90 88 88   RDW 12.7 12.9 13.7        Chemistries:    Recent Labs  Lab 07/13/18  1404 07/13/18  1831 07/13/18 1833 07/13/18 2000 07/13/18  2345    140  --  141 138   K 5.7* 5.4*  --  4.7 4.5   * 116*  --  113* 110   CO2 18* 12*  --  18* 18*   BUN 21 23  --  25* 27*   CREATININE 1.2 1.4  --  1.6* 1.6*   CALCIUM 8.0* 8.1*  --  8.0* 8.2*   ALBUMIN 2.9* 2.5*  --  2.6*  --    PROT 6.0 5.3*  --  5.3*  --    BILITOT 1.1* 1.7*  --  1.7*  --    ALKPHOS 94 86  --  82  --    ALT 43 56*  --  56*  --    AST 58* 84*  --  80*  --    MG  --   --  1.8  --  1.8   PHOS  --   --  3.5  --  3.4       Blood Culture: No results for input(s): LABBLOO in the last 48 hours.  CMP:   Recent Labs  Lab 07/13/18  1404 07/13/18  1831 07/13/18 2000 07/13/18  2345    140 141 138   K 5.7* 5.4* 4.7 4.5   * 116* 113* 110   CO2 18* 12* 18* 18*   * 227* 175* 192*   BUN 21 23 25* 27*   CREATININE 1.2 1.4 1.6* 1.6*   CALCIUM 8.0* 8.1* 8.0* 8.2*   PROT 6.0 5.3* 5.3*  --    ALBUMIN 2.9* 2.5* 2.6*  --    BILITOT 1.1* 1.7* 1.7*  --    ALKPHOS 94 86 82  --    AST 58* 84* 80*  --    ALT 43 56* 56*  --    ANIONGAP 9 12 10 10   EGFRNONAA 48.9* 40.6* 34.5* 34.5*     Coagulation:   Recent Labs  Lab 07/13/18  2345   INR 1.6*   APTT 46.4*     Lactic  Acid: No results for input(s): LACTATE in the last 48 hours.    Significant Imaging: I have reviewed all pertinent imaging results/findings within the past 24 hours.

## 2018-07-14 NOTE — ASSESSMENT & PLAN NOTE
- Patient is hemodynamically stable s/p 4 units PRBCs, 2FFP, continue to trend CBCs  - Recommend q4hrs CBCc to monitor H/H.   - Continue Critical care admission  - INR corrected to 1.4   - recommend transfusing patient and IV fluids as needed   - Bed rest for patient  - Recommend D/C chew catheter  - Recommend stopping IV antibiotics, suspicion for infection is low  - At this time would not recommend intervention.   Do not send to interventional radiology for intervention without discussing with urology  - please call with questions or concerns.

## 2018-07-14 NOTE — SUBJECTIVE & OBJECTIVE
Past Medical History:   Diagnosis Date    Antiphospholipid antibody syndrome     Brain aneurysm     Cancer     GERD (gastroesophageal reflux disease)     Migraine headache     Thyroid disease        Past Surgical History:   Procedure Laterality Date    CHOLECYSTECTOMY      HERNIA REPAIR      KNEE SURGERY      THYROID SURGERY         Review of patient's allergies indicates:   Allergen Reactions    Bactrim [sulfamethoxazole-trimethoprim]     Iodine and iodide containing products      According to pt's son, pt's neck swells up with iodine contrast exposure       No current facility-administered medications on file prior to encounter.      No current outpatient prescriptions on file prior to encounter.     Family History     None        Social History Main Topics    Smoking status: Never Smoker    Smokeless tobacco: Not on file    Alcohol use Not on file    Drug use: Unknown    Sexual activity: Not on file     Review of Systems   Constitutional: Positive for fatigue. Negative for chills and fever.   Respiratory: Negative for cough and shortness of breath.    Cardiovascular: Negative for chest pain and leg swelling.   Gastrointestinal: Positive for abdominal pain. Negative for abdominal distention.   Genitourinary: Positive for flank pain and hematuria.   Musculoskeletal: Positive for back pain.   Skin: Positive for pallor. Negative for rash.   Neurological: Negative for dizziness and headaches.   Psychiatric/Behavioral: Negative for agitation and behavioral problems.     Objective:     Vital Signs (Most Recent):  Temp: 97.7 °F (36.5 °C) (07/13/18 1907)  Pulse: 99 (07/13/18 1907)  Resp: (!) 23 (07/13/18 1907)  BP: (!) 108/56 (07/13/18 1907)  SpO2: 99 % (07/13/18 1907) Vital Signs (24h Range):  Temp:  [97.5 °F (36.4 °C)-98.7 °F (37.1 °C)] 97.7 °F (36.5 °C)  Pulse:  [] 99  Resp:  [14-30] 23  SpO2:  [94 %-100 %] 99 %  BP: ()/(45-94) 108/56     Weight: 104 kg (229 lb 4.5 oz)  Body mass index is  34.86 kg/m².    Physical Exam   Constitutional: She is oriented to person, place, and time. She appears well-developed and well-nourished.   HENT:   Head: Normocephalic and atraumatic.   Cardiovascular: Normal rate and regular rhythm.    Abdominal: Soft. Bowel sounds are normal. She exhibits no distension. There is tenderness (right upper and lower quadrant).   Musculoskeletal: She exhibits tenderness (lower right back/flank). She exhibits no edema.   Neurological: She is oriented to person, place, and time.   Skin: Skin is warm and dry. There is pallor.   Psychiatric: She has a normal mood and affect. Her behavior is normal.           Significant Labs:   CBC:   Recent Labs  Lab 07/13/18  1404 07/13/18  1831   WBC 16.68* 21.25*   HGB 11.0* 10.7*   HCT 35.1* 32.5*    221     CMP:   Recent Labs  Lab 07/13/18  1404      K 5.7*   *   CO2 18*   *   BUN 21   CREATININE 1.2   CALCIUM 8.0*   PROT 6.0   ALBUMIN 2.9*   BILITOT 1.1*   ALKPHOS 94   AST 58*   ALT 43   ANIONGAP 9   EGFRNONAA 48.9*     Coagulation:   Recent Labs  Lab 07/13/18  1404 07/13/18  1831   INR 5.0* 4.4*   APTT 56.8*  --      POCT Glucose:   Recent Labs  Lab 07/13/18  1747   POCTGLUCOSE 248*       Significant Imaging:     Outside CT abdomen demonstrated perinephric hematoma

## 2018-07-14 NOTE — ASSESSMENT & PLAN NOTE
-patient reports that she has a brain aneurysm for which she takes lisinopril 20mg  -will hold in setting of MAURO and hypotension  -can restart when both resolve

## 2018-07-14 NOTE — PLAN OF CARE
Patient currently stable  Remains in sinus tach, low 100s  BP WNL  Sats >92% on 5L NC  Moderate pain relieved with PRN fentanyl  Bedside abdominal ultrasound performed  Taken for abdominal CT, tolerated well  CBC, INR, PTT trended q 4, H&H continues to trend downward  Orellana in placed, U/O now decreased to 10-15cc/hr, ~ 300cc U/O during shift  Remains NPO  5 mg metoprolol given for tachycardia/ hypertension with improvement  Plan of care reviewed with pt and family  Will continue to monitor

## 2018-07-14 NOTE — ASSESSMENT & PLAN NOTE
-patient is s/p thyroid CA and had a thyroidectomy  -she takes synthroid daily but unsure of her  Dose  -she is going to ask her  in the AM and will receive her next dose then

## 2018-07-14 NOTE — ASSESSMENT & PLAN NOTE
- Patient admitted from outside hospital with right lower flank and back pain  -CT abdomen at outside hospital showed perinephric hematoma  - Hx of antiphospholipid syndrome   - INR 5.0 on admission and on repeat 4 hours later was 4.4; given vitamin K in ED  - Received 2U of blood total since transfer; received FFP and bolus of 1L saline  - Due to dropping blood pressures (SBP in mid 80s with DBP in mid 50s) admitted to ICU   -since admit to the ICU she has had adequate BP with SBP >120 and MAPs >65  - Urology and IR consulted, but do not feel that there is an intervention needed at this time  -Plans to get CTA (load with hydrocrotisone 200 and IV benadryl 50; patient denies anaphylaxis reaction) however, patients MAURO precluded us from getting the study.  Given that her Hg was starting to stabilize, we did not pursue this further  -CBC and INR has been monitored q4h.  7/14 - lates hg was 9.4 and INR was 1.3  -will continue to check q4h   7/14 - Abdominal Ultrasound shows Large heterogeneous collection in the RUQ. Difficult to differentiate from the (R) Kidney. Represents probable hematoma. Consider correlation with outside studies or further evaluation with CT to serve as baseline.

## 2018-07-14 NOTE — PLAN OF CARE
"Problem: Patient Care Overview  Goal: Plan of Care Review  Outcome: Ongoing (interventions implemented as appropriate)  Dx: Perinephric Hematoma    Shift Events: 3 FFP, 2 PRBC, 2L NS, 500cc LR, 300cc bile emesis    Neuro: AAO x4, Moves all extremities and Follows commands    Vital Signs: /61 (BP Location: Left arm, Patient Position: Lying)   Pulse 110   Temp 98.4 °F (36.9 °C) (Oral)   Resp (!) 22   Ht 5' 8" (1.727 m)   Wt 104 kg (229 lb 4.5 oz)   SpO2 (!) 92%   Breastfeeding? No   BMI 34.86 kg/m²  4L NC    Intake/Gtts/Diet: NPO    Output: UOP: 330cc over shift     Labs: CBC, PTT, INR q4h    Skin: Heels, elbows, and sacrum w/o redness or breakdown         "

## 2018-07-14 NOTE — H&P
[facility]  Critical Care Medicine  History & Physical    Patient Name: Emiliana Persaud  MRN: [id number]  Admission Date: 7/13/2018  Hospital Length of Stay: 1 days  Code Status: Full Code  Attending Physician: Florencia Argueta*   Primary Care Provider: [facility]   Principal Problem: Perinephric hematoma    Subjective:     HPI:   Patient is a 63 yo F with significant past medical history of antiphospholipid syndrome, thyroid cancer, IBS, brain aneurysm, and chronic migraines who presents from [facility] ED in [place], [place] with concerns of a perinephric hematoma.    Patient states that the pain began 7/12 at 11 pm. It progressively got worse. She did not try anything for the pain. The pain has been constant. It radiates to the right abdomen. Patient endorsed hematuria, HA, nausea, thirst, dry mouth. he denies chest pain, SOB. CT at OSH showed perinephric hematoma. Outside labs show INR of 2.6.  OSH labs wre WBC/10.9, H/H 13.1/37.6  One unit of blood was given in route to [facility]. Patient received morphine, dilaudid, and phenergen at outside hospital.     Patient completed 2 blood transfusions in [facility] ED.  At that time, patient was admitted to hospital medicine.  Hg was 10.7 s/p 2 U.  Urology and IR consulted for retroperitoneal bleed.  They stated that there was intervention needed at this time.  In the evening of 7/13, code response team was called due to patient feeling lethargic, weak, and diaphoretic. Team went to see patient. Extremely lethargic but easily aroused.  Oriented X4 with no motor deficits noted.  BS performed revealed a BS of 258.   BP 94/65 RR 12 O2 94% on RA.  Placed on 2L NC.  Labs including POCT glucose, ABG, lactic acid, CMP, CBC, PT/INR were ordered. Blood pressure started dropping: SBP running in the mid 90s and DBP in the mid 40s-low 50s. Bolus of saline and FFP were ordered and administered.     Consulted ICU for hypotension.      Hospital/ICU Course:  No notes on file     Past Medical History:   Diagnosis Date    Antiphospholipid antibody syndrome     Brain aneurysm     Cancer     GERD (gastroesophageal reflux disease)     Migraine headache     Thyroid disease        Past Surgical History:   Procedure Laterality Date    CHOLECYSTECTOMY      HERNIA REPAIR      KNEE SURGERY      THYROID SURGERY         Review of patient's allergies indicates:   Allergen Reactions    Bactrim [sulfamethoxazole-trimethoprim]     Iodine and iodide containing products      According to pt's son, pt's neck swells up with iodine contrast exposure       Family History     None        Social History Main Topics    Smoking status: Never Smoker    Smokeless tobacco: Not on file    Alcohol use Not on file    Drug use: Unknown    Sexual activity: Not on file      Review of Systems   Constitutional: Negative for activity change, appetite change, chills, fatigue, fever and unexpected weight change.   HENT: Negative for congestion, ear discharge and rhinorrhea.    Eyes: Negative for photophobia, redness and visual disturbance.   Respiratory: Negative for apnea, cough, choking, shortness of breath and wheezing.    Cardiovascular: Negative for chest pain, palpitations and leg swelling.   Gastrointestinal: Positive for abdominal pain and nausea. Negative for abdominal distention, constipation and vomiting.   Endocrine: Negative for polydipsia, polyphagia and polyuria.   Genitourinary: Positive for flank pain and hematuria. Negative for dysuria and frequency.   Musculoskeletal: Positive for back pain. Negative for arthralgias, gait problem, joint swelling and myalgias.   Skin: Negative for color change, pallor, rash and wound.   Allergic/Immunologic: Negative for immunocompromised state.   Neurological: Negative for dizziness, seizures, speech difficulty, weakness, numbness and headaches.   Hematological: Negative for adenopathy. Does not bruise/bleed easily.    Psychiatric/Behavioral: Negative for agitation, behavioral problems, confusion and decreased concentration.   All other systems reviewed and are negative.    Objective:     Vital Signs (Most Recent):  Temp: 98.5 °F (36.9 °C) (07/13/18 2300)  Pulse: 102 (07/14/18 0100)  Resp: (!) 26 (07/14/18 0100)  BP: 129/75 (07/14/18 0100)  SpO2: (!) 93 % (07/14/18 0100) Vital Signs (24h Range):  Temp:  [97.5 °F (36.4 °C)-98.7 °F (37.1 °C)] 98.5 °F (36.9 °C)  Pulse:  [] 102  Resp:  [14-30] 26  SpO2:  [93 %-100 %] 93 %  BP: ()/(45-94) 129/75   Weight: 104 kg (229 lb 4.5 oz)  Body mass index is 34.86 kg/m².      Intake/Output Summary (Last 24 hours) at 07/14/18 0128  Last data filed at 07/14/18 0100   Gross per 24 hour   Intake             1602 ml   Output              230 ml   Net             1372 ml       Physical Exam   Constitutional: She is oriented to person, place, and time. She appears well-developed and well-nourished. No distress.   HENT:   Head: Normocephalic and atraumatic.   Eyes: EOM are normal. Pupils are equal, round, and reactive to light. Right eye exhibits no discharge. Left eye exhibits no discharge.   Neck: Normal range of motion. Neck supple. No JVD present.   Cardiovascular: Normal rate, regular rhythm and normal heart sounds.  Exam reveals no gallop and no friction rub.    No murmur heard.  Pulmonary/Chest: Effort normal and breath sounds normal. No respiratory distress. She has no wheezes.   Abdominal: Soft. Bowel sounds are normal. She exhibits no distension and no mass. There is no tenderness. There is no guarding.   Tenderness in RUQ, flank tenderness   Musculoskeletal: Normal range of motion. She exhibits tenderness. She exhibits no edema or deformity.   No bruising or discoloration   Neurological: She is alert and oriented to person, place, and time.   Skin: Skin is warm and dry. She is not diaphoretic. No erythema. No pallor.   Nursing note and vitals reviewed.      Vents:  Oxygen  Concentration (%): 28 (07/13/18 2329)  Lines/Drains/Airways     Drain                 Urethral Catheter 07/13/18 2230 Latex less than 1 day          Peripheral Intravenous Line                 Peripheral IV - Single Lumen 07/13/18 1353 Right Forearm less than 1 day         Peripheral IV - Single Lumen 07/13/18 1354 Left Antecubital less than 1 day         Peripheral IV - Single Lumen 07/13/18 1831 Right Hand less than 1 day         Peripheral IV - Single Lumen 07/13/18 1902 Right Antecubital less than 1 day              Significant Labs:    CBC/Anemia Profile:    Recent Labs  Lab 07/13/18  1831 07/13/18 2000 07/13/18  2345   WBC 21.25* 22.17* 17.64*   HGB 10.7* 9.7* 10.5*   HCT 32.5* 29.0* 31.5*    202 162   MCV 90 88 88   RDW 12.7 12.9 13.7        Chemistries:    Recent Labs  Lab 07/13/18  1404 07/13/18  1831 07/13/18 1833 07/13/18 2000 07/13/18  2345    140  --  141 138   K 5.7* 5.4*  --  4.7 4.5   * 116*  --  113* 110   CO2 18* 12*  --  18* 18*   BUN 21 23  --  25* 27*   CREATININE 1.2 1.4  --  1.6* 1.6*   CALCIUM 8.0* 8.1*  --  8.0* 8.2*   ALBUMIN 2.9* 2.5*  --  2.6*  --    PROT 6.0 5.3*  --  5.3*  --    BILITOT 1.1* 1.7*  --  1.7*  --    ALKPHOS 94 86  --  82  --    ALT 43 56*  --  56*  --    AST 58* 84*  --  80*  --    MG  --   --  1.8  --  1.8   PHOS  --   --  3.5  --  3.4       Blood Culture: No results for input(s): LABBLOO in the last 48 hours.  CMP:   Recent Labs  Lab 07/13/18  1404 07/13/18 1831 07/13/18 2000 07/13/18  2345    140 141 138   K 5.7* 5.4* 4.7 4.5   * 116* 113* 110   CO2 18* 12* 18* 18*   * 227* 175* 192*   BUN 21 23 25* 27*   CREATININE 1.2 1.4 1.6* 1.6*   CALCIUM 8.0* 8.1* 8.0* 8.2*   PROT 6.0 5.3* 5.3*  --    ALBUMIN 2.9* 2.5* 2.6*  --    BILITOT 1.1* 1.7* 1.7*  --    ALKPHOS 94 86 82  --    AST 58* 84* 80*  --    ALT 43 56* 56*  --    ANIONGAP 9 12 10 10   EGFRNONAA 48.9* 40.6* 34.5* 34.5*     Coagulation:   Recent Labs  Lab 07/13/18  2345   INR  1.6*   APTT 46.4*     Lactic Acid: No results for input(s): LACTATE in the last 48 hours.    Significant Imaging: I have reviewed all pertinent imaging results/findings within the past 24 hours.    Assessment/Plan:     Neuro   Brain aneurysm    -patient reports that she has a brain aneurysm for which she takes lisinopril 20mg  -will hold in setting of MAURO and hypotension  -can restart when both resolve        Renal/   MAURO (acute kidney injury)    -patients Cr increased to 1.4 and then 1.6 (no kidney issues on baseline)  -most likely 2/2 to hypoperfusion  -expect this to resolve with fluids and  Blood products  -getting urine studies to r/o other causes of MAURO        Hyperkalemia    -resolved        Endocrine   Hypothyroidism    -patient is s/p thyroid CA and had a thyroidectomy  -she takes synthroid daily but unsure of her  Dose  -she is going to ask her  in the AM and will receive her next dose then        Orthopedic   * Perinephric hematoma     - Patient admitted from outside hospital with right lower flank and back pain  -CT abdomen at outside hospital showed perinephric hematoma  - Hx of antiphospholipid syndrome  - INR 5.0 on admission and on repeat 4 hours later was 4.4; given vitamin K in ED  - Received 2U of blood total since transfer; received FFP and bolus of 1L saline  - Due to dropping blood pressures (SBP in mid 80s with DBP in mid 50s) admitted to ICU   -since admit to the ICU she has had adequate BP with SBP >120 and MAPs >65  - Urology and IR consulted, but do not feel that there is an intervention needed at this time  -Plans to get CTA (load with hydrocrotisone 200 and IV benadryl 50; patient denies anaphylaxis reaction) however, patients MAURO precluded us from getting the study.  Given that her Hg was starting to stabilize, we did not pursue this further  -CBC and INR has been monitored q4h.  Latest hg was 10.5 and INR was 1.6  -will continue to check q4h but likely a venous bleed that has  tamponaded with the correction in INR             Critical Care Daily Checklist:    A: Awake: RASS Goal/Actual Goal:    Actual: Garcia Agitation Sedation Scale (RASS): Drowsy   B: Spontaneous Breathing Trial Performed?     C: SAT & SBT Coordinated?  NA                      D: Delirium: CAM-ICU     E: Early Mobility Performed? No   F: Feeding Goal:    Status:     Current Diet Order   Procedures    Diet NPO      AS: Analgesia/Sedation NA   T: Thromboembolic Prophylaxis NA-BLEED   H: HOB > 300 Yes   U: Stress Ulcer Prophylaxis (if needed) famotidine   G: Glucose Control NA   B: Bowel Function     I: Indwelling Catheter (Lines & Orellana) Necessity PIVx2   D: De-escalation of Antimicrobials/Pharmacotherapies Vanc and zosyn    Plan for the day/ETD Deescalate antibiotics and q4h CBC and INR    Code Status:  Family/Goals of Care: Full Code          Critical care was time spent personally by me on the following activities: development of treatment plan with patient or surrogate and bedside caregivers, discussions with consultants, evaluation of patient's response to treatment, examination of patient, ordering and performing treatments and interventions, ordering and review of laboratory studies, ordering and review of radiographic studies, pulse oximetry, re-evaluation of patient's condition. This critical care time did not overlap with that of any other provider or involve time for any procedures.     Mikayla Matute MD  Critical Care Medicine  [facility]

## 2018-07-14 NOTE — SUBJECTIVE & OBJECTIVE
Interval History/Significant Events: Pt examined at bedside in no acute distress. Pt reports abdominal discomfort which is exacerbated by heavy breathing. Pt reports a baseline tachycardia for which she takes metoprolol. Abdominal ultrasound today, possible CTA. Urology and IR have been made aware of the pt and will assist if necessary.     Review of Systems   Constitutional: Negative for chills and diaphoresis.   HENT: Negative for nosebleeds and sore throat.    Eyes: Negative for discharge and visual disturbance.   Respiratory: Negative for chest tightness and wheezing.         Abdominal pain upon inspiration   Cardiovascular: Negative for chest pain and leg swelling.   Gastrointestinal: Positive for abdominal pain and nausea. Negative for abdominal distention.   Endocrine: Negative for cold intolerance and heat intolerance.   Genitourinary: Positive for flank pain and hematuria. Negative for urgency.   Musculoskeletal: Negative for myalgias and neck stiffness.   Skin: Negative for color change and wound.   Neurological: Negative for dizziness and facial asymmetry.   Hematological: Negative for adenopathy. Does not bruise/bleed easily.   Psychiatric/Behavioral: Negative for decreased concentration and suicidal ideas.     Objective:     Vital Signs (Most Recent):  Temp: 98.9 °F (37.2 °C) (07/14/18 1100)  Pulse: 106 (07/14/18 1445)  Resp: (!) 25 (07/14/18 1445)  BP: (!) 144/77 (07/14/18 1430)  SpO2: (!) 93 % (07/14/18 1445) Vital Signs (24h Range):  Temp:  [97.5 °F (36.4 °C)-99.1 °F (37.3 °C)] 98.9 °F (37.2 °C)  Pulse:  [] 106  Resp:  [15-31] 25  SpO2:  [92 %-100 %] 93 %  BP: ()/(45-94) 144/77   Weight: 104 kg (229 lb 4.5 oz)  Body mass index is 34.86 kg/m².      Intake/Output Summary (Last 24 hours) at 07/14/18 1503  Last data filed at 07/14/18 1400   Gross per 24 hour   Intake             2212 ml   Output              560 ml   Net             1652 ml       Physical Exam   Constitutional: She appears  well-developed and well-nourished. No distress.   HENT:   Head: Normocephalic and atraumatic.   Eyes: Conjunctivae are normal. Pupils are equal, round, and reactive to light.   Neck: Normal range of motion. No thyromegaly present.   Cardiovascular: Normal rate, regular rhythm and normal heart sounds.  Exam reveals no gallop and no friction rub.    No murmur heard.  Pulmonary/Chest: Effort normal. No respiratory distress. She has no wheezes. She exhibits no tenderness.   Abdominal: Soft. Bowel sounds are normal. There is tenderness.   Musculoskeletal: Normal range of motion. She exhibits no deformity.   Neurological: No cranial nerve deficit.   Skin: Skin is warm and dry. Capillary refill takes less than 2 seconds. She is not diaphoretic.   Psychiatric: She has a normal mood and affect. Her behavior is normal.   Nursing note and vitals reviewed.      Vents:  Oxygen Concentration (%): 28 (07/13/18 2329)  Lines/Drains/Airways     Drain                 Urethral Catheter 07/13/18 2230 Latex less than 1 day          Peripheral Intravenous Line                 Peripheral IV - Single Lumen 07/13/18 1353 Right Forearm 1 day         Peripheral IV - Single Lumen 07/13/18 1354 Left Antecubital 1 day         Peripheral IV - Single Lumen 07/13/18 1831 Right Hand less than 1 day         Peripheral IV - Single Lumen 07/13/18 1902 Right Antecubital less than 1 day              Significant Labs:    CBC/Anemia Profile:    Recent Labs  Lab 07/14/18  0257 07/14/18  0738 07/14/18  1147   WBC 20.28* 21.40* 23.21*   HGB 10.6* 9.7* 9.4*   HCT 30.7* 29.5* 28.2*    173 171   MCV 87 89 88   RDW 13.8 14.0 14.0        Chemistries:    Recent Labs  Lab 07/13/18  1831 07/13/18  1833 07/13/18  2000 07/13/18  2345 07/14/18  0257     --  141 138 139   K 5.4*  --  4.7 4.5 4.6   *  --  113* 110 111*   CO2 12*  --  18* 18* 16*   BUN 23  --  25* 27* 28*   CREATININE 1.4  --  1.6* 1.6* 1.6*   CALCIUM 8.1*  --  8.0* 8.2* 8.4*   ALBUMIN  2.5*  --  2.6*  --  3.0*   PROT 5.3*  --  5.3*  --  6.2   BILITOT 1.7*  --  1.7*  --  1.9*   ALKPHOS 86  --  82  --  82   ALT 56*  --  56*  --  118*   AST 84*  --  80*  --  191*   MG  --  1.8  --  1.8 2.2   PHOS  --  3.5  --  3.4 4.0       Recent Lab Results       07/14/18  1147 07/14/18  0738 07/14/18  0401 07/14/18  0257 07/13/18  2345      Immature Granulocytes 0.8(H) 0.7(H)  0.7(H) 0.8(H)     Immature Grans (Abs) 0.19  Comment:  Mild elevation in immature granulocytes is non specific and   can be seen in a variety of conditions including stress response,   acute inflammation, trauma and pregnancy. Correlation with other   laboratory and clinical findings is essential.  (H) 0.14  Comment:  Mild elevation in immature granulocytes is non specific and   can be seen in a variety of conditions including stress response,   acute inflammation, trauma and pregnancy. Correlation with other   laboratory and clinical findings is essential.  (H)  0.15  Comment:  Mild elevation in immature granulocytes is non specific and   can be seen in a variety of conditions including stress response,   acute inflammation, trauma and pregnancy. Correlation with other   laboratory and clinical findings is essential.  (H) 0.14  Comment:  Mild elevation in immature granulocytes is non specific and   can be seen in a variety of conditions including stress response,   acute inflammation, trauma and pregnancy. Correlation with other   laboratory and clinical findings is essential.  (H)     Albumin    3.0(L)      Alkaline Phosphatase    82      Allens Test          ALT    118(H)      Anion Gap    12 10     Aniso Slight         Appearance, UA          aPTT 41.2  Comment:  aPTT therapeutic range = 39-69 seconds(H) 40.9  Comment:  aPTT therapeutic range = 39-69 seconds(H)  42.7  Comment:  aPTT therapeutic range = 39-69 seconds(H) 46.4  Comment:  aPTT therapeutic range = 39-69 seconds(H)     AST    191(H)      Bacteria, UA          Baso # 0.03 0.02   0.02 0.03     Basophil% 0.1 0.1  0.1 0.2     Bilirubin (UA)          Total Bilirubin    1.9  Comment:  For infants and newborns, interpretation of results should be based  on gestational age, weight and in agreement with clinical  observations.  Premature Infant recommended reference ranges:  Up to 24 hours.............<8.0 mg/dL  Up to 48 hours............<12.0 mg/dL  3-5 days..................<15.0 mg/dL  6-29 days.................<15.0 mg/dL  (H)      Blood Culture, Routine          Site          BUN, Bld    28(H) 27(H)     Calcium    8.4(L) 8.2(L)     Calcium, Ion          Chloride    111(H) 110     CO2    16(L) 18(L)     Color, UA          Creatinine    1.6(H) 1.6(H)     Creatinine, Random Ur   184.0  Comment:  The random urine reference ranges provided were established   for 24 hour urine collections.  No reference ranges exist for  random urine specimens.  Correlate clinically.            184.0  Comment:  The random urine reference ranges provided were established   for 24 hour urine collections.  No reference ranges exist for  random urine specimens.  Correlate clinically.         DelSys          Differential Method Automated Automated  Automated Automated     eGFR if     39.8(A) 39.8(A)     eGFR if non     34.5  Comment:  Calculation used to obtain the estimated glomerular filtration  rate (eGFR) is the CKD-EPI equation.   (A) 34.5  Comment:  Calculation used to obtain the estimated glomerular filtration  rate (eGFR) is the CKD-EPI equation.   (A)     Eos # 0.0 0.0  0.0 0.0     Eosinophil% 0.0 0.0  0.0 0.0     Estimated Avg Glucose          FiO2          Glucose    180(H) 192(H)     Glucose, UA          Gran # (ANC) 18.7(H) 17.5(H)  16.7(H) 15.1(H)     Gran% 80.5(H) 81.9(H)  82.4(H) 85.4(H)     Hematocrit 28.2(L) 29.5(L)  30.7(L) 31.5(L)     Hemoglobin 9.4(L) 9.7(L)  10.6(L) 10.5(L)     Hemoglobin A1C          Hyaline Casts, UA          Coumadin Monitoring INR  1.3  Comment:  Coumadin Therapy:  2.0 - 3.0 for INR for all indicators except mechanical heart valves  and antiphospholipid syndromes which should use 2.5 - 3.5.  (H) 1.4  Comment:  Coumadin Therapy:  2.0 - 3.0 for INR for all indicators except mechanical heart valves  and antiphospholipid syndromes which should use 2.5 - 3.5.  (H)  1.4  Comment:  Coumadin Therapy:  2.0 - 3.0 for INR for all indicators except mechanical heart valves  and antiphospholipid syndromes which should use 2.5 - 3.5.  (H) 1.6  Comment:  Coumadin Therapy:  2.0 - 3.0 for INR for all indicators except mechanical heart valves  and antiphospholipid syndromes which should use 2.5 - 3.5.  (H)     Ketones, UA          Lactate, Sai    2.7  Comment:  Falsely low lactic acid results can be found in samples   containing >=13.0 mg/dL total bilirubin and/or >=3.5 mg/dL   direct bilirubin.  (H)      Leukocytes, UA          Lymph # 1.9 1.9  2.1 1.6     Lymph% 8.0(L) 9.0(L)  10.5(L) 9.1(L)     Magnesium    2.2 1.8     MCH 29.5 29.4  29.9 29.4     MCHC 33.3 32.9  34.5 33.3     MCV 88 89  87 88     Microscopic Comment          Mode          Mono # 2.5(H) 1.8(H)  1.3(H) 0.8     Mono% 10.6 8.3  6.3 4.5     MPV 11.3 11.3  11.8 11.4     Nitrite, UA          nRBC 0 0  0 0     Occult Blood UA          pH, UA          Phosphorus    4.0 3.4     Platelet Estimate Appears normal         Platelets 171 173  189 162     POC BE          POC HCO3          POC Lactate          POC PCO2          POC PH          POC PO2          POC SATURATED O2          POC TCO2          POCT Glucose          Potassium    4.6 4.5     Prot/Creat Ratio, Ur   0.46(H)       Total Protein    6.2      Protein, UA          Protein, Urine Random   85  Comment:  The random urine reference ranges provided were established   for 24 hour urine collections.  No reference ranges exist for  random urine specimens.  Correlate clinically.  (H)       Protime 13.5(H) 13.8(H)  14.2(H) 16.0(H)     Rate           RBC 3.19(L) 3.30(L)  3.55(L) 3.57(L)     RBC, UA          RDW 14.0 14.0  13.8 13.7     Sample          Sodium    139 138     Sodium Urine Random   24  Comment:  The random urine reference ranges provided were established   for 24 hour urine collections.  No reference ranges exist for  random urine specimens.  Correlate clinically.         Sp02          Specific Gravity, UA          Specimen UA          Troponin I          Urobilinogen, UA          Vancomycin, Random    35.9      WBC, UA          WBC 23.21(H) 21.40(H)  20.28(H) 17.64(H)     Sinha's Stain, Ur   No eosinophils seen                   07/13/18  2231 07/13/18  2145 07/13/18  2130 07/13/18 2000 07/13/18  1837      Immature Granulocytes    1.1(H)      Immature Grans (Abs)    0.25  Comment:  Mild elevation in immature granulocytes is non specific and   can be seen in a variety of conditions including stress response,   acute inflammation, trauma and pregnancy. Correlation with other   laboratory and clinical findings is essential.  (H)      Albumin    2.6(L)      Alkaline Phosphatase    82      Allens Test     Pass     ALT    56(H)      Anion Gap    10      Aniso          Appearance, UA Hazy(A)         aPTT          AST    80(H)      Bacteria, UA Moderate(A)         Baso #    0.02      Basophil%    0.1      Bilirubin (UA) Negative         Total Bilirubin    1.7  Comment:  For infants and newborns, interpretation of results should be based  on gestational age, weight and in agreement with clinical  observations.  Premature Infant recommended reference ranges:  Up to 24 hours.............<8.0 mg/dL  Up to 48 hours............<12.0 mg/dL  3-5 days..................<15.0 mg/dL  6-29 days.................<15.0 mg/dL  (H)      Blood Culture, Routine  No Growth to date[P] No Growth to date[P]       Site     RR     BUN, Bld    25(H)      Calcium    8.0(L)      Calcium, Ion    1.07      Chloride    113(H)      CO2    18(L)      Color, UA Tammy         Creatinine     1.6(H)      Creatinine, Random Ur          [facility]     Room Air     Differential Method    Automated      eGFR if     39.8(A)      eGFR if non     34.5  Comment:  Calculation used to obtain the estimated glomerular filtration  rate (eGFR) is the CKD-EPI equation.   (A)      Eos #    0.0      Eosinophil%    0.0      Estimated Avg Glucose          FiO2     21     Glucose    175(H)      Glucose, UA 2+(A)         Gran # (ANC)    18.3(H)      Gran%    82.3(H)      Hematocrit    29.0(L)      Hemoglobin    9.7(L)      Hemoglobin A1C          Hyaline Casts, UA 38(A)         Coumadin Monitoring INR    2.7  Comment:  Coumadin Therapy:  2.0 - 3.0 for INR for all indicators except mechanical heart valves  and antiphospholipid syndromes which should use 2.5 - 3.5.  (H)      Ketones, UA Negative         Lactate, Sai          Leukocytes, UA Negative         Lymph #    1.7      Lymph%    7.5(L)      Magnesium          MCH    29.5      MCHC    33.4      MCV    88      Microscopic Comment SEE COMMENT  Comment:  Other formed elements not mentioned in the report are not   present in the microscopic examination.            Mode     SPONT     Mono #    2.0(H)      Mono%    9.0      MPV    11.3      Nitrite, UA Negative         nRBC    0      Occult Blood UA 2+(A)         pH, UA 5.0         Phosphorus          Platelet Estimate          Platelets    202      POC BE     -13     POC HCO3     13.4(L)     POC Lactate     3.20(H)     POC PCO2     26.0(LL)     POC PH     7.320(L)     POC PO2     74(L)     POC SATURATED O2     94(L)     POC TCO2     14(L)     POCT Glucose          Potassium    4.7      Prot/Creat Ratio, Ur          Total Protein    5.3(L)      Protein, UA 1+  Comment:  Recommend a 24 hour urine protein or a urine   protein/creatinine ratio if globulin induced proteinuria is  clinically suspected.  (A)         Protein, Urine Random          Protime    25.8(H)      Rate     28     RBC    3.29(L)       RBC, UA 6(H)         RDW    12.9      Sample     ARTERIAL     Sodium    141      Sodium Urine Random          Sp02     96     Specific [place], UA 1.030         Specimen UA Urine, Catheterized         Troponin I          Urobilinogen, UA Negative         Vancomycin, Random          WBC, UA 2         WBC    22.17(H)      Sinha's Stain, Ur                      07/13/18  1833 07/13/18  1831 07/13/18  1747 07/13/18  1735      Immature Granulocytes  1.3(H)       Immature Grans (Abs)  0.27  Comment:  Mild elevation in immature granulocytes is non specific and   can be seen in a variety of conditions including stress response,   acute inflammation, trauma and pregnancy. Correlation with other   laboratory and clinical findings is essential.  (H)       Albumin  2.5(L)       Alkaline Phosphatase  86       Allens Test         ALT  56(H)       Anion Gap  12       Aniso         Appearance, UA         aPTT         AST  84(H)       Bacteria, UA         Baso #  0.03       Basophil%  0.1       Bilirubin (UA)         Total Bilirubin  1.7  Comment:  For infants and newborns, interpretation of results should be based  on gestational age, weight and in agreement with clinical  observations.  Premature Infant recommended reference ranges:  Up to 24 hours.............<8.0 mg/dL  Up to 48 hours............<12.0 mg/dL  3-5 days..................<15.0 mg/dL  6-29 days.................<15.0 mg/dL  (H)       Blood Culture, Routine         Site         BUN, Bld  23       Calcium  8.1(L)       Calcium, Ion         Chloride  116(H)       CO2  12(L)       Color, UA         Creatinine  1.4       Creatinine, Random Ur         DelSys         Differential Method  Automated       eGFR if   46.8(A)       eGFR if non   40.6  Comment:  Calculation used to obtain the estimated glomerular filtration  rate (eGFR) is the CKD-EPI equation.   (A)       Eos #  0.0       Eosinophil%  0.0       Estimated Avg Glucose    105      FiO2         Glucose  227(H)       Glucose, UA         Gran # (ANC)  18.0(H)       Gran%  84.8(H)       Hematocrit  32.5(L)       Hemoglobin  10.7(L)       Hemoglobin A1C    5.3  Comment:  ADA Screening Guidelines:  5.7-6.4%  Consistent with prediabetes  >or=6.5%  Consistent with diabetes  High levels of fetal hemoglobin interfere with the HbA1C  assay. Heterozygous hemoglobin variants (HbS, HgC, etc)do  not significantly interfere with this assay.   However, presence of multiple variants may affect accuracy.       Hyaline Casts, UA         Coumadin Monitoring INR  4.4  Comment:  Coumadin Therapy:  2.0 - 3.0 for INR for all indicators except mechanical heart valves  and antiphospholipid syndromes which should use 2.5 - 3.5.  (H)       Ketones, UA         Lactate, Sai         Leukocytes, UA         Lymph #  1.7       Lymph%  8.2(L)       Magnesium 1.8        MCH  29.5       MCHC  32.9       MCV  90       Microscopic Comment         Mode         Mono #  1.2(H)       Mono%  5.6       MPV  11.1       Nitrite, UA         nRBC  0       Occult Blood UA         pH, UA         Phosphorus 3.5        Platelet Estimate         Platelets  221       POC BE         POC HCO3         POC Lactate         POC PCO2         POC PH         POC PO2         POC SATURATED O2         POC TCO2         POCT Glucose   248(H)      Potassium  5.4  Comment:  *No Visible Hemolysis(H)       Prot/Creat Ratio, Ur         Total Protein  5.3(L)       Protein, UA         Protein, Urine Random         Protime  43.0(H)       Rate         RBC  3.63(L)       RBC, UA         RDW  12.7       Sample         Sodium  140       Sodium Urine Random         Sp02         Specific Gravity, UA         Specimen UA         Troponin I  <0.006  Comment:  The reference interval for Troponin I represents the 99th percentile   cutoff   for our facility and is consistent with 3rd generation assay   performance.         Urobilinogen, UA         Vancomycin, Random         WBC,  UA         WBC  21.25(H)       Sinha's Stain, Ur             All pertinent labs within the past 24 hours have been reviewed.    Significant Imaging:  I have reviewed all pertinent imaging results/findings within the past 24 hours.

## 2018-07-14 NOTE — PLAN OF CARE
Problem: Occupational Therapy Goal  Goal: Occupational Therapy Goal  Goals to be met by: 7/22/18     Patient will increase functional independence with ADLs by performing:    UE Dressing with Moderate Assistance.  Grooming while seated with Stand-by Assistance.  Toileting from bedside commode with Maximum Assistance for hygiene and clothing management.   Sitting at edge of bed x15 minutes with Stand-by Assistance.  Rolling to Bilateral with Stand-by Assistance.   Supine to sit with Minimal Assistance.  Stand pivot transfers with Moderate Assistance.    Outcome: Ongoing (interventions implemented as appropriate)  OT eval completed.

## 2018-07-14 NOTE — PROGRESS NOTES
On admission patient arrived AAOx4, c/o back pain and nausea. Initial VSS, prn pain med and zofran given per pt request. Called to room around 1710 because patient was diaphoretic and appeared pale. POCT blood glucose 248 and unable to get a manual or automatic BP. RRT called and pt was transferred to ICU via RRT CCRN.

## 2018-07-14 NOTE — PROGRESS NOTES
[facility]  Urology  Progress Note    Patient Name: Emiilana Persaud  MRN: [id number]  Admission Date: 7/13/2018  Hospital Length of Stay: 1 days  Code Status: Full Code   Attending Provider: Sharon Kasper MD   Primary Care Physician: [facility]    Subjective:     HPI:  61yo F with history of antiphospholipid syndrome on warfarin who was brought to the ED as a transfer from [facility] for perinephric hematoma. Per her family, she woke in the middle of the night with severe right flank and abdominal pain. In the ED she was found to have a perinephric hematoma and an INR of 2.5. She was transferred to [facility] [place] for further evaluation. When she arrived to [facility] ED she had a Hgb of 11 and an INR of 5.0. Her vital signs were stable. She was currently receiving a 2nd unit of PRBCs in the ED.     She has no urologic surgical history. She has had a partial thyroidectomy, cholecystectomy with incisional hernia (s/p mesh repair) and radiation for an auditory nerve tumor. Her main complaints are fatigue and abdominal and flank pain.     Interval History:   Had episode of hypotension yesterday and mental status change and drop in H&H  Was transferred to ICU   Transfused 2 units PRBCs, 2 FFP, 2L  Now stable, trending CBCs Q4  INR corrected to 1.4    Review of Systems   Constitutional: Positive for fatigue. Negative for activity change, appetite change, chills and fever.   HENT: Negative.    Eyes: Negative.    Respiratory: Negative for chest tightness and shortness of breath.    Cardiovascular: Negative for chest pain.   Gastrointestinal: Positive for abdominal pain. Negative for abdominal distention, nausea and vomiting.   Genitourinary: Positive for flank pain. Negative for difficulty urinating, dysuria and hematuria.   Skin: Negative.    Neurological: Negative.  Negative for dizziness.   Psychiatric/Behavioral: Negative.      Objective:     Temp:  [97.5 °F (36.4 °C)-99.1 °F (37.3 °C)] 98.9  °F (37.2 °C)  Pulse:  [] 124  Resp:  [15-31] 24  SpO2:  [92 %-100 %] 93 %  BP: ()/(45-94) 151/86     Body mass index is 34.86 kg/m².      Date 07/14/18 0700 - 07/15/18 0659   Shift 2699-8956 8762-7691 4327-2190 24 Hour Total   I  N  T  A  K  E   Shift Total  (mL/kg)       O  U  T  P  U  T   Urine  (mL/kg/hr) 135   135    Shift Total  (mL/kg) 135  (1.3)   135  (1.3)   Weight (kg) 104 104 104 104     Bladder Scan Volume (mL): 200 mL (07/13/18 2115)    Drains     Drain                 Urethral Catheter 07/13/18 2230 Latex less than 1 day                Physical Exam   Constitutional: No distress.   HENT:   Head: Normocephalic and atraumatic.   Eyes: Conjunctivae are normal. No scleral icterus.   Neck: Normal range of motion.   Cardiovascular: Normal rate.    Pulmonary/Chest: Effort normal. No respiratory distress.   Abdominal: Soft. She exhibits no distension. There is tenderness. There is no rebound and no guarding.   Right upper and lower quadrant tenderness  No left sided tenderness  Right CVAT  Cholecystectomy and hernia repair scars well healed   Musculoskeletal: Normal range of motion.   Neurological: She is alert.   Skin: Skin is warm and dry. She is not diaphoretic.     Psychiatric: She has a normal mood and affect.       Significant Labs:    BMP:    Recent Labs  Lab 07/13/18 2000 07/13/18 2345 07/14/18 0257    138 139   K 4.7 4.5 4.6   * 110 111*   CO2 18* 18* 16*   BUN 25* 27* 28*   CREATININE 1.6* 1.6* 1.6*   CALCIUM 8.0* 8.2* 8.4*       CBC:     Recent Labs  Lab 07/13/18 2345 07/14/18 0257 07/14/18  0738   WBC 17.64* 20.28* 21.40*   HGB 10.5* 10.6* 9.7*   HCT 31.5* 30.7* 29.5*    189 173       Coagulation:   Recent Labs  Lab 07/13/18 2345 07/14/18 0257 07/14/18  0738   INR 1.6* 1.4* 1.4*   APTT 46.4* 42.7* 40.9*     All pertinent labs results from the past 24 hours have been reviewed.    Significant Imaging:  All pertinent imaging results/findings from the past 24 hours  have been reviewed.                  Assessment/Plan:     * Perinephric hematoma    - Patient is hemodynamically stable s/p 4 units PRBCs, 2FFP, continue to trend CBCs  - Recommend q4hrs CBCc to monitor H/H.   - Continue Critical care admission  - INR corrected to 1.4   - recommend transfusing patient and IV fluids as needed   - Bed rest for patient  - Recommend D/C chew catheter  - Recommend stopping IV antibiotics, suspicion for infection is low  - At this time would not recommend intervention.   Do not send to interventional radiology for intervention without discussing with urology  - please call with questions or concerns.               VTE Risk Mitigation         Ordered     Place MARYJO hose  Until discontinued      07/13/18 1500     IP VTE LOW RISK PATIENT  Once      07/13/18 1500          Martinez Reese MD  Urology  [facility]

## 2018-07-14 NOTE — HOSPITAL COURSE
On the hospital floor, code response team was called due to patient feeling lethargic, weak, and diaphoretic. Team went to see patient. Extremely lethargic but easily aroused.  Oriented X4 with no motor deficits noted.  BS performed revealed a BS of 258.   BP 94/65 RR 12 O2 94% on RA.  Placed on 2L NC. Awoke to verbal stimuli. Orientated to person, time, place. Labs including POCT glucose, ABG, lactic acid, CMP, CBC, PT/INR were ordered. Blood pressure started dropping: SBP running in the mid 90s and DBP in the mid 40s-low 50s. Bolus of saline and FFP were ordered and administered. Consulted ICU for potential admit due to concern of decreasing blood pressures and potential need for pressors.     Patient will be transferred to ICU for further management.

## 2018-07-14 NOTE — SIGNIFICANT EVENT
Rapid Response Nurse Note     Rapid Response Metrics:     Admit Date: 2018  LOS: 0  Code Status: Full Code   Date of Consult: 2018  : 1955  Age: 62 y.o.  Weight:   Wt Readings from Last 1 Encounters:   18 104 kg (229 lb 4.5 oz)     Race: White  Sex: female  Bed: [address] [place] [address] A:   MRN: [id number]  Time Rapid Response Team page Received: 1748  Time Rapid Response Team at Bedside: 1715  Time Rapid Response Team left Bedside: 1908  Was the patient discharged from an ICU this admission? No   Was the patient discharged from a PACU within last 24 hours? No  Did the patient receive conscious sedation/general anesthesia within last 24 hours? NO  Was the patient in the ED within the past 24 hours? Yes  Was the patient started on NIPPV within the past 24 hours? No  Did this progress into an ARC or CPA: No  Attending Physician: Florencia Argueta*  Primary Service: [facility]  Consult Requested By: Florencia Argueta*   Rapid Response Indication(s): Lethargy Hypotension  Disposition: Transferred to SICU    SITUATION:     Reason for Call:   Called to evaluate the patient for Circulatory    BACKGROUND:     Why is the patient in the hospital?: Perinephric Hematoma  What changed?: Became hypotensive and lethargic    ASSESSMENT:     What did you find: Upon assessment patient found to be lying in bed, eyes closed, and very diaphoretic.  Extremely lethargic but easily aroused.  Oriented X4 with no motor deficits noted.  BS performed revealed a BS of 258.   BP 94/65 RR 12 O2 94% on RA.  Placed on 2L NC. Primary team notifed.  Kendall BAZAN at bedside to assess.  Instructed RN to administer 1 liter NS bolus and to draw CBC, Lactic acid, Coags, and CMP.  Labs drawn and bolus initiated.  BP 84/58.  Primary team notified and critical care team consulted.  Robert BAZAN from critical care team at bedside to assess.  1 unit FFPs ordered and administered.  /78.  Decision made to transfer patient  to ICU.  Orders placed, house supervisor notified and report called to SICU.  Placed on mobile monitor and O2 tank.  Transferred to SICU room [address]145 with RN X2.  Bedside handoff given to receiving RN.    RECOMMENDATIONS:     We recommend: Transfer to ICU    FOLLOW-UP/CONTINGENCY PLAN:     Patient needs a second visit at : N/A    Call the rapid response Nurse at x [phone] for additional questions or concerns.      PHYSICIAN ESCALATION:     Orders received and case discussed with Robert BAZAN     Disposition: Transferred to SICU

## 2018-07-14 NOTE — PT/OT/SLP EVAL
Occupational Therapy   Evaluation    Name: Emiliana Persaud  MRN: [id number]  Admitting Diagnosis:  Perinephric hematoma      Recommendations:     Discharge Recommendations: rehabilitation facility (pending progress with further mobility)  Discharge Equipment Recommendations:   (TBD)  Barriers to discharge:  None    History:     Occupational Profile:  Living Environment & PLOF: Pt resides with spouse in 1 story house with 1 step to enter.  Pt has a walk-in shower for bathing.  Pt is an active .  Pt is a housewife.  Pt enjoys yardwork & her 2 shree [place].  Equipment Owned:   (shower bench in walk-in shower stall)      Past Medical History:   Diagnosis Date    Antiphospholipid antibody syndrome     Brain aneurysm     Cancer     GERD (gastroesophageal reflux disease)     Migraine headache     Thyroid disease        Past Surgical History:   Procedure Laterality Date    CHOLECYSTECTOMY      HERNIA REPAIR      KNEE SURGERY      THYROID SURGERY         Subjective   Pt reported that she was weaker just after 2 days in the hospital.  Chief Complaint: abdominal pain  Patient/Family stated goals: to get better  Communicated with: RN prior to session.  Pain/Comfort:  · Location - Side 1: Right  · Location 1: abdomen  · Pain Addressed 1: Reposition, Distraction, Cessation of Activity, Nurse notified  · Pain Rating Post-Intervention 1:  (pt reported pain however did not rate pain level)    Patients cultural, spiritual, Amish conflicts given the current situation: none stated    Objective:     Patient found with: blood pressure cuff, chew catheter, oxygen, pulse ox (continuous), telemetry, peripheral IV    General Precautions: Standard, fall, NPO (cardiac)     Occupational Performance:    Bed Mobility:    · Patient completed Rolling/Turning to Left with  maximal assistance  · Patient completed Scooting/Bridging with maximal assistance scooting forward on EOB; dependent drawsheet transfer up [facility] while  supine  · Patient completed Supine to Sit with maximal assistance  · Patient completed Sit to Supine with maximal assistance    Functional Mobility/Transfers:  · NT    Activities of Daily Living:  · NT    Cognitive/Visual Perceptual:  Cognitive/Psychosocial Skills:     -       Oriented to: Person, Place, Time and Situation   -       Follows Commands/attention:Follows multistep  commands  -       Communication: clear/fluent  -       Memory: No Deficits noted  -       Safety awareness/insight to disability: intact   -       Mood/Affect/Coping skills/emotional control: Appropriate to situation    Physical Exam:  Postural examination/scapula alignment:    -       Rounded shoulders  -       Forward head  -       Posterior pelvic tilt  Sensation:    -       Intact  Dominant hand:    -       right  Upper Extremity Range of Motion:     -       Right Upper Extremity: WFL  -       Left Upper Extremity: WFL  Upper Extremity Strength:    -       Right Upper Extremity: WFL  -       Left Upper Extremity: WFL   Strength:    -       Right Upper Extremity: WFL  -       Left Upper Extremity: WFL    Patient left supine with all lines intact, call button in reach, RN notified, spouse present and white board updated.    [facility] 6 Click:  [facility] Total Score: 13    Treatment & Education:  Provided education regarding role of OT, POC, & discharge recommendations with pt & family verbalizing understanding.  Pt had no further questions & when asked whether there were any concerns pt reported none.    Pt required SBA-Min (A) for postural control while seated EOB for ~ 3 minutes.  Pt with elevated HR into 140's with decreased O2 sats into 80's therefore returned pt to supine & notified RN.  With return to supine pt's O2 sats back into high 90's immediately & HR decreased into 120's with rest (RN aware & reported pt has been resting in the 120's & team is aware).  Education:    Assessment:     Emiliana Persaud is a 62 y.o. female with a medical  "diagnosis of Perinephric hematoma.  She presents with limited session due to pain & cardiopulmonary responses to activity.  Performance deficits affecting function are weakness, impaired endurance, impaired functional mobilty, impaired self care skills, impaired balance, decreased upper extremity function, decreased lower extremity function, impaired cardiopulmonary response to activity.      Rehab Prognosis:  fair; patient would benefit from acute skilled OT services to address these deficits and reach maximum level of function.         Clinical Decision Makin.  OT Low:  "Pt evaluation falls under low complexity for evaluation coding due to performance deficits noted in 1-3 areas as stated above and 0 co-morbities affecting current functional status. Data obtained from problem focused assessments. No modifications or assistance was required for completion of evaluation. Only brief occupational profile and history review completed."     Plan:     Patient to be seen 4 x/week to address the above listed problems via self-care/home management, therapeutic activities, therapeutic exercises  · Plan of Care Expires: 18  · Plan of Care Reviewed with: patient, spouse    This Plan of care has been discussed with the patient who was involved in its development and understands and is in agreement with the identified goals and treatment plan    GOALS:    Occupational Therapy Goals        Problem: Occupational Therapy Goal    Goal Priority Disciplines Outcome Interventions   Occupational Therapy Goal     OT, PT/OT Ongoing (interventions implemented as appropriate)    Description:  Goals to be met by: 18     Patient will increase functional independence with ADLs by performing:    UE Dressing with Moderate Assistance.  Grooming while seated with Stand-by Assistance.  Toileting from bedside commode with Maximum Assistance for hygiene and clothing management.   Sitting at edge of bed x15 minutes with Stand-by " Assistance.  Rolling to Bilateral with Stand-by Assistance.   Supine to sit with Minimal Assistance.  Stand pivot transfers with Moderate Assistance.                      Time Tracking:     OT Date of Treatment: 07/14/18  OT Start Time: 1342  OT Stop Time: 1400  OT Total Time (min): 18 min    Billable Minutes:Evaluation 18    SALIMA Parisi  7/14/2018

## 2018-07-14 NOTE — PROGRESS NOTES
[facility]  Critical Care Medicine  Progress Note    Patient Name: Emiliana Persaud  MRN: [id number]  Admission Date: 7/13/2018  Hospital Length of Stay: 1 days  Code Status: Full Code  Attending Provider: Sharon Kasper MD  Primary Care Provider: [facility]   Principal Problem: Perinephric hematoma    Subjective:     HPI:   Patient is a 61 yo F with significant past medical history of antiphospholipid syndrome, thyroid cancer, IBS, brain aneurysm, and chronic migraines who presents from [facility] ED in [place], [place] with concerns of a perinephric hematoma.    Patient states that the pain began 7/12 at 11 pm. It progressively got worse. She did not try anything for the pain. The pain has been constant. It radiates to the right abdomen. Patient endorsed hematuria, HA, nausea, thirst, dry mouth. he denies chest pain, SOB. CT at OSH showed perinephric hematoma. Outside labs show INR of 2.6.  OSH labs wre WBC/10.9, H/H 13.1/37.6  One unit of blood was given in route to [facility]. Patient received morphine, dilaudid, and phenergen at outside hospital.     Patient completed 2 blood transfusions in [facility] ED.  At that time, patient was admitted to hospital medicine.  Hg was 10.7 s/p 2 U.  Urology and IR consulted for retroperitoneal bleed.  They stated that there was intervention needed at this time.  In the evening of 7/13, code response team was called due to patient feeling lethargic, weak, and diaphoretic. Team went to see patient. Extremely lethargic but easily aroused.  Oriented X4 with no motor deficits noted.  BS performed revealed a BS of 258.   BP 94/65 RR 12 O2 94% on RA.  Placed on 2L NC.  Labs including POCT glucose, ABG, lactic acid, CMP, CBC, PT/INR were ordered. Blood pressure started dropping: SBP running in the mid 90s and DBP in the mid 40s-low 50s. Bolus of saline and FFP were ordered and administered.     Consulted ICU for hypotension.     Hospital/ICU  Course:  No notes on file    Interval History/Significant Events: Pt examined at bedside in no acute distress. Pt reports abdominal discomfort which is exacerbated by heavy breathing. Pt reports a baseline tachycardia for which she takes metoprolol. Abdominal ultrasound today, CT abdomen pelvis today. Urology and IR have been made aware of the pt and will assist if necessary.     Review of Systems   Constitutional: Negative for chills and diaphoresis.   HENT: Negative for nosebleeds and sore throat.    Eyes: Negative for discharge and visual disturbance.   Respiratory: Negative for chest tightness and wheezing.         Abdominal pain upon inspiration   Cardiovascular: Negative for chest pain and leg swelling.   Gastrointestinal: Positive for abdominal pain and nausea. Negative for abdominal distention.   Endocrine: Negative for cold intolerance and heat intolerance.   Genitourinary: Positive for flank pain and hematuria. Negative for urgency.   Musculoskeletal: Negative for myalgias and neck stiffness.   Skin: Negative for color change and wound.   Neurological: Negative for dizziness and facial asymmetry.   Hematological: Negative for adenopathy. Does not bruise/bleed easily.   Psychiatric/Behavioral: Negative for decreased concentration and suicidal ideas.     Objective:     Vital Signs (Most Recent):  Temp: 98.9 °F (37.2 °C) (07/14/18 1100)  Pulse: 106 (07/14/18 1445)  Resp: (!) 25 (07/14/18 1445)  BP: (!) 144/77 (07/14/18 1430)  SpO2: (!) 93 % (07/14/18 1445) Vital Signs (24h Range):  Temp:  [97.5 °F (36.4 °C)-99.1 °F (37.3 °C)] 98.9 °F (37.2 °C)  Pulse:  [] 106  Resp:  [15-31] 25  SpO2:  [92 %-100 %] 93 %  BP: ()/(45-94) 144/77   Weight: 104 kg (229 lb 4.5 oz)  Body mass index is 34.86 kg/m².      Intake/Output Summary (Last 24 hours) at 07/14/18 1503  Last data filed at 07/14/18 1400   Gross per 24 hour   Intake             2212 ml   Output              560 ml   Net             1652 ml        Physical Exam   Constitutional: She appears well-developed and well-nourished. No distress.   HENT:   Head: Normocephalic and atraumatic.   Eyes: Conjunctivae are normal. Pupils are equal, round, and reactive to light.   Neck: Normal range of motion. No thyromegaly present.   Cardiovascular: Normal rate, regular rhythm and normal heart sounds.  Exam reveals no gallop and no friction rub.    No murmur heard.  Pulmonary/Chest: Effort normal. No respiratory distress. She has no wheezes. She exhibits no tenderness.   Abdominal: Soft. Bowel sounds are normal. There is tenderness.   Musculoskeletal: Normal range of motion. She exhibits no deformity.   Neurological: No cranial nerve deficit.   Skin: Skin is warm and dry. Capillary refill takes less than 2 seconds. She is not diaphoretic.   Psychiatric: She has a normal mood and affect. Her behavior is normal.   Nursing note and vitals reviewed.      Vents:  Oxygen Concentration (%): 28 (07/13/18 2329)  Lines/Drains/Airways     Drain                 Urethral Catheter 07/13/18 2230 Latex less than 1 day          Peripheral Intravenous Line                 Peripheral IV - Single Lumen 07/13/18 1353 Right Forearm 1 day         Peripheral IV - Single Lumen 07/13/18 1354 Left Antecubital 1 day         Peripheral IV - Single Lumen 07/13/18 1831 Right Hand less than 1 day         Peripheral IV - Single Lumen 07/13/18 1902 Right Antecubital less than 1 day              Significant Labs:    CBC/Anemia Profile:    Recent Labs  Lab 07/14/18  0257 07/14/18  0738 07/14/18  1147   WBC 20.28* 21.40* 23.21*   HGB 10.6* 9.7* 9.4*   HCT 30.7* 29.5* 28.2*    173 171   MCV 87 89 88   RDW 13.8 14.0 14.0        Chemistries:    Recent Labs  Lab 07/13/18  1831 07/13/18  1833 07/13/18  2000 07/13/18  2345 07/14/18  0257     --  141 138 139   K 5.4*  --  4.7 4.5 4.6   *  --  113* 110 111*   CO2 12*  --  18* 18* 16*   BUN 23  --  25* 27* 28*   CREATININE 1.4  --  1.6* 1.6*  1.6*   CALCIUM 8.1*  --  8.0* 8.2* 8.4*   ALBUMIN 2.5*  --  2.6*  --  3.0*   PROT 5.3*  --  5.3*  --  6.2   BILITOT 1.7*  --  1.7*  --  1.9*   ALKPHOS 86  --  82  --  82   ALT 56*  --  56*  --  118*   AST 84*  --  80*  --  191*   MG  --  1.8  --  1.8 2.2   PHOS  --  3.5  --  3.4 4.0       Recent Lab Results       07/14/18  1147 07/14/18  0738 07/14/18  0401 07/14/18  0257 07/13/18  2345      Immature Granulocytes 0.8(H) 0.7(H)  0.7(H) 0.8(H)     Immature Grans (Abs) 0.19  Comment:  Mild elevation in immature granulocytes is non specific and   can be seen in a variety of conditions including stress response,   acute inflammation, trauma and pregnancy. Correlation with other   laboratory and clinical findings is essential.  (H) 0.14  Comment:  Mild elevation in immature granulocytes is non specific and   can be seen in a variety of conditions including stress response,   acute inflammation, trauma and pregnancy. Correlation with other   laboratory and clinical findings is essential.  (H)  0.15  Comment:  Mild elevation in immature granulocytes is non specific and   can be seen in a variety of conditions including stress response,   acute inflammation, trauma and pregnancy. Correlation with other   laboratory and clinical findings is essential.  (H) 0.14  Comment:  Mild elevation in immature granulocytes is non specific and   can be seen in a variety of conditions including stress response,   acute inflammation, trauma and pregnancy. Correlation with other   laboratory and clinical findings is essential.  (H)     Albumin    3.0(L)      Alkaline Phosphatase    82      Allens Test          ALT    118(H)      Anion Gap    12 10     Aniso Slight         Appearance, UA          aPTT 41.2  Comment:  aPTT therapeutic range = 39-69 seconds(H) 40.9  Comment:  aPTT therapeutic range = 39-69 seconds(H)  42.7  Comment:  aPTT therapeutic range = 39-69 seconds(H) 46.4  Comment:  aPTT therapeutic range = 39-69 seconds(H)     AST     191(H)      Bacteria, UA          Baso # 0.03 0.02  0.02 0.03     Basophil% 0.1 0.1  0.1 0.2     Bilirubin (UA)          Total Bilirubin    1.9  Comment:  For infants and newborns, interpretation of results should be based  on gestational age, weight and in agreement with clinical  observations.  Premature Infant recommended reference ranges:  Up to 24 hours.............<8.0 mg/dL  Up to 48 hours............<12.0 mg/dL  3-5 days..................<15.0 mg/dL  6-29 days.................<15.0 mg/dL  (H)      Blood Culture, Routine          Site          BUN, Bld    28(H) 27(H)     Calcium    8.4(L) 8.2(L)     Calcium, Ion          Chloride    111(H) 110     CO2    16(L) 18(L)     Color, UA          Creatinine    1.6(H) 1.6(H)     Creatinine, Random Ur   184.0  Comment:  The random urine reference ranges provided were established   for 24 hour urine collections.  No reference ranges exist for  random urine specimens.  Correlate clinically.            184.0  Comment:  The random urine reference ranges provided were established   for 24 hour urine collections.  No reference ranges exist for  random urine specimens.  Correlate clinically.         DelSys          Differential Method Automated Automated  Automated Automated     eGFR if     39.8(A) 39.8(A)     eGFR if non     34.5  Comment:  Calculation used to obtain the estimated glomerular filtration  rate (eGFR) is the CKD-EPI equation.   (A) 34.5  Comment:  Calculation used to obtain the estimated glomerular filtration  rate (eGFR) is the CKD-EPI equation.   (A)     Eos # 0.0 0.0  0.0 0.0     Eosinophil% 0.0 0.0  0.0 0.0     Estimated Avg Glucose          FiO2          Glucose    180(H) 192(H)     Glucose, UA          Gran # (ANC) 18.7(H) 17.5(H)  16.7(H) 15.1(H)     Gran% 80.5(H) 81.9(H)  82.4(H) 85.4(H)     Hematocrit 28.2(L) 29.5(L)  30.7(L) 31.5(L)     Hemoglobin 9.4(L) 9.7(L)  10.6(L) 10.5(L)     Hemoglobin A1C          Hyaline Casts,  UA          Coumadin Monitoring INR 1.3  Comment:  Coumadin Therapy:  2.0 - 3.0 for INR for all indicators except mechanical heart valves  and antiphospholipid syndromes which should use 2.5 - 3.5.  (H) 1.4  Comment:  Coumadin Therapy:  2.0 - 3.0 for INR for all indicators except mechanical heart valves  and antiphospholipid syndromes which should use 2.5 - 3.5.  (H)  1.4  Comment:  Coumadin Therapy:  2.0 - 3.0 for INR for all indicators except mechanical heart valves  and antiphospholipid syndromes which should use 2.5 - 3.5.  (H) 1.6  Comment:  Coumadin Therapy:  2.0 - 3.0 for INR for all indicators except mechanical heart valves  and antiphospholipid syndromes which should use 2.5 - 3.5.  (H)     Ketones, UA          Lactate, Sai    2.7  Comment:  Falsely low lactic acid results can be found in samples   containing >=13.0 mg/dL total bilirubin and/or >=3.5 mg/dL   direct bilirubin.  (H)      Leukocytes, UA          Lymph # 1.9 1.9  2.1 1.6     Lymph% 8.0(L) 9.0(L)  10.5(L) 9.1(L)     Magnesium    2.2 1.8     MCH 29.5 29.4  29.9 29.4     MCHC 33.3 32.9  34.5 33.3     MCV 88 89  87 88     Microscopic Comment          Mode          Mono # 2.5(H) 1.8(H)  1.3(H) 0.8     Mono% 10.6 8.3  6.3 4.5     MPV 11.3 11.3  11.8 11.4     Nitrite, UA          nRBC 0 0  0 0     Occult Blood UA          pH, UA          Phosphorus    4.0 3.4     Platelet Estimate Appears normal         Platelets 171 173  189 162     POC BE          POC HCO3          POC Lactate          POC PCO2          POC PH          POC PO2          POC SATURATED O2          POC TCO2          POCT Glucose          Potassium    4.6 4.5     Prot/Creat Ratio, Ur   0.46(H)       Total Protein    6.2      Protein, UA          Protein, Urine Random   85  Comment:  The random urine reference ranges provided were established   for 24 hour urine collections.  No reference ranges exist for  random urine specimens.  Correlate clinically.  (H)       Protime 13.5(H) 13.8(H)   14.2(H) 16.0(H)     Rate          RBC 3.19(L) 3.30(L)  3.55(L) 3.57(L)     RBC, UA          RDW 14.0 14.0  13.8 13.7     Sample          Sodium    139 138     Sodium Urine Random   24  Comment:  The random urine reference ranges provided were established   for 24 hour urine collections.  No reference ranges exist for  random urine specimens.  Correlate clinically.         Sp02          Specific Gravity, UA          Specimen UA          Troponin I          Urobilinogen, UA          Vancomycin, Random    35.9      WBC, UA          WBC 23.21(H) 21.40(H)  20.28(H) 17.64(H)     Sinha's Stain, Ur   No eosinophils seen                   07/13/18 2231 07/13/18  2145 07/13/18 2130 07/13/18 2000 07/13/18  1837      Immature Granulocytes    1.1(H)      Immature Grans (Abs)    0.25  Comment:  Mild elevation in immature granulocytes is non specific and   can be seen in a variety of conditions including stress response,   acute inflammation, trauma and pregnancy. Correlation with other   laboratory and clinical findings is essential.  (H)      Albumin    2.6(L)      Alkaline Phosphatase    82      Allens Test     Pass     ALT    56(H)      Anion Gap    10      Aniso          Appearance, UA Hazy(A)         aPTT          AST    80(H)      Bacteria, UA Moderate(A)         Baso #    0.02      Basophil%    0.1      Bilirubin (UA) Negative         Total Bilirubin    1.7  Comment:  For infants and newborns, interpretation of results should be based  on gestational age, weight and in agreement with clinical  observations.  Premature Infant recommended reference ranges:  Up to 24 hours.............<8.0 mg/dL  Up to 48 hours............<12.0 mg/dL  3-5 days..................<15.0 mg/dL  6-29 days.................<15.0 mg/dL  (H)      Blood Culture, Routine  No Growth to date[P] No Growth to date[P]       Site     RR     BUN, Bld    25(H)      Calcium    8.0(L)      Calcium, Ion    1.07      Chloride    113(H)      CO2    18(L)       Color, UA Tammy         Creatinine    1.6(H)      Creatinine, Random Ur          DelSys     Room Air     Differential Method    Automated      eGFR if     39.8(A)      eGFR if non     34.5  Comment:  Calculation used to obtain the estimated glomerular filtration  rate (eGFR) is the CKD-EPI equation.   (A)      Eos #    0.0      Eosinophil%    0.0      Estimated Avg Glucose          FiO2     21     Glucose    175(H)      Glucose, UA 2+(A)         Gran # (ANC)    18.3(H)      Gran%    82.3(H)      Hematocrit    29.0(L)      Hemoglobin    9.7(L)      Hemoglobin A1C          Hyaline Casts, UA 38(A)         Coumadin Monitoring INR    2.7  Comment:  Coumadin Therapy:  2.0 - 3.0 for INR for all indicators except mechanical heart valves  and antiphospholipid syndromes which should use 2.5 - 3.5.  (H)      Ketones, UA Negative         Lactate, Sai          Leukocytes, UA Negative         Lymph #    1.7      Lymph%    7.5(L)      Magnesium          MCH    29.5      MCHC    33.4      MCV    88      Microscopic Comment SEE COMMENT  Comment:  Other formed elements not mentioned in the report are not   present in the microscopic examination.            Mode     SPONT     Mono #    2.0(H)      Mono%    9.0      MPV    11.3      Nitrite, UA Negative         nRBC    0      Occult Blood UA 2+(A)         pH, UA 5.0         Phosphorus          Platelet Estimate          Platelets    202      POC BE     -13     POC HCO3     13.4(L)     POC Lactate     3.20(H)     POC PCO2     26.0(LL)     POC PH     7.320(L)     POC PO2     74(L)     POC SATURATED O2     94(L)     POC TCO2     14(L)     POCT Glucose          Potassium    4.7      Prot/Creat Ratio, Ur          Total Protein    5.3(L)      Protein, UA 1+  Comment:  Recommend a 24 hour urine protein or a urine   protein/creatinine ratio if globulin induced proteinuria is  clinically suspected.  (A)         Protein, Urine Random          Protime    25.8(H)       Rate     28     RBC    3.29(L)      RBC, UA 6(H)         RDW    12.9      Sample     ARTERIAL     Sodium    141      Sodium Urine Random          Sp02     96     Specific [place], UA 1.030         Specimen UA Urine, Catheterized         Troponin I          Urobilinogen, UA Negative         Vancomycin, Random          WBC, UA 2         WBC    22.17(H)      Sinha's Stain, Ur                      07/13/18  1833 07/13/18  1831 07/13/18  1747 07/13/18  1735      Immature Granulocytes  1.3(H)       Immature Grans (Abs)  0.27  Comment:  Mild elevation in immature granulocytes is non specific and   can be seen in a variety of conditions including stress response,   acute inflammation, trauma and pregnancy. Correlation with other   laboratory and clinical findings is essential.  (H)       Albumin  2.5(L)       Alkaline Phosphatase  86       Allens Test         ALT  56(H)       Anion Gap  12       Aniso         Appearance, UA         aPTT         AST  84(H)       Bacteria, UA         Baso #  0.03       Basophil%  0.1       Bilirubin (UA)         Total Bilirubin  1.7  Comment:  For infants and newborns, interpretation of results should be based  on gestational age, weight and in agreement with clinical  observations.  Premature Infant recommended reference ranges:  Up to 24 hours.............<8.0 mg/dL  Up to 48 hours............<12.0 mg/dL  3-5 days..................<15.0 mg/dL  6-29 days.................<15.0 mg/dL  (H)       Blood Culture, Routine         Site         BUN, Bld  23       Calcium  8.1(L)       Calcium, Ion         Chloride  116(H)       CO2  12(L)       Color, UA         Creatinine  1.4       Creatinine, Random Ur         DelSys         Differential Method  Automated       eGFR if   46.8(A)       eGFR if non   40.6  Comment:  Calculation used to obtain the estimated glomerular filtration  rate (eGFR) is the CKD-EPI equation.   (A)       Eos #  0.0       Eosinophil%  0.0        Estimated Avg Glucose    105     FiO2         Glucose  227(H)       Glucose, UA         Gran # (ANC)  18.0(H)       Gran%  84.8(H)       Hematocrit  32.5(L)       Hemoglobin  10.7(L)       Hemoglobin A1C    5.3  Comment:  ADA Screening Guidelines:  5.7-6.4%  Consistent with prediabetes  >or=6.5%  Consistent with diabetes  High levels of fetal hemoglobin interfere with the HbA1C  assay. Heterozygous hemoglobin variants (HbS, HgC, etc)do  not significantly interfere with this assay.   However, presence of multiple variants may affect accuracy.       Hyaline Casts, UA         Coumadin Monitoring INR  4.4  Comment:  Coumadin Therapy:  2.0 - 3.0 for INR for all indicators except mechanical heart valves  and antiphospholipid syndromes which should use 2.5 - 3.5.  (H)       Ketones, UA         Lactate, Sai         Leukocytes, UA         Lymph #  1.7       Lymph%  8.2(L)       Magnesium 1.8        MCH  29.5       MCHC  32.9       MCV  90       Microscopic Comment         Mode         Mono #  1.2(H)       Mono%  5.6       MPV  11.1       Nitrite, UA         nRBC  0       Occult Blood UA         pH, UA         Phosphorus 3.5        Platelet Estimate         Platelets  221       POC BE         POC HCO3         POC Lactate         POC PCO2         POC PH         POC PO2         POC SATURATED O2         POC TCO2         POCT Glucose   248(H)      Potassium  5.4  Comment:  *No Visible Hemolysis(H)       Prot/Creat Ratio, Ur         Total Protein  5.3(L)       Protein, UA         Protein, Urine Random         Protime  43.0(H)       Rate         RBC  3.63(L)       RBC, UA         RDW  12.7       Sample         Sodium  140       Sodium Urine Random         Sp02         Specific Gravity, UA         Specimen UA         Troponin I  <0.006  Comment:  The reference interval for Troponin I represents the 99th percentile   cutoff   for our facility and is consistent with 3rd generation assay   performance.         Urobilinogen, UA          Vancomycin, Random         WBC, UA         WBC  21.25(H)       Sinha's Stain, Ur             All pertinent labs within the past 24 hours have been reviewed.    Significant Imaging:  I have reviewed all pertinent imaging results/findings within the past 24 hours.    Assessment/Plan:     Neuro   Brain aneurysm    -patient reports that she has a brain aneurysm for which she takes lisinopril 20mg  -will hold in setting of MAURO and hypotension  -can restart when both resolve        Renal/   MAURO (acute kidney injury)    -patients Cr increased to 1.4 and then 1.6 (no kidney issues on baseline)  -most likely 2/2 to hypoperfusion  -expect this to resolve with fluids and  Blood products  -getting urine studies to r/o other causes of MAURO        Hyperkalemia    -resolved        Endocrine   Hypothyroidism    -patient is s/p thyroid CA and had a thyroidectomy  -she takes synthroid daily but unsure of her Dose        Orthopedic   * Perinephric hematoma     - Patient admitted from outside hospital with right lower flank and back pain  -CT abdomen at outside hospital showed perinephric hematoma  - Hx of antiphospholipid syndrome   - INR 5.0 on admission and on repeat 4 hours later was 4.4; given vitamin K in ED  - Received 2U of blood total since transfer; received FFP and bolus of 1L saline  - Due to dropping blood pressures (SBP in mid 80s with DBP in mid 50s) admitted to ICU   -since admit to the ICU she has had adequate BP with SBP >120 and MAPs >65  - Urology and IR consulted, but do not feel that there is an intervention needed at this time  -Plans to get CTA (load with hydrocrotisone 200 and IV benadryl 50; patient denies anaphylaxis reaction) however, patients MAURO precluded us from getting the study.  Given that her Hg was starting to stabilize, we did not pursue this further  -CBC and INR has been monitored q4h.  7/14 - lates hg was 9.4 and INR was 1.3  -will continue to check q4h   7/14 - Abdominal Ultrasound shows  Large heterogeneous collection in the RUQ. Difficult to differentiate from the (R) Kidney. Represents probable hematoma. Consider correlation with outside studies or further evaluation with CT to serve as baseline.  7/14 - CXR - There are small bilateral pleural effusions.  There is bibasilar atelectasis or consolidation.  There is no pneumothorax. Cardiac silhouette is prominent.          Eric Lara MD  Critical Care Medicine  [facility]

## 2018-07-14 NOTE — ASSESSMENT & PLAN NOTE
- Patient with reported history of thyroid cancer; patient and son report that patient takes synthroid; dose unknown  - TSH ordered  - Please restart synthroid or titrate dose

## 2018-07-14 NOTE — ASSESSMENT & PLAN NOTE
- CT at outside hospital showed perinephric hematoma  - Hx of antiphospholipid syndrome; on coumadin  - Received morphine injection in ED  - Continue to assess

## 2018-07-14 NOTE — H&P
[facility] Hospital Medicine  History & Physical    Patient Name: Emiliana Persaud  MRN: [id number]  Admission Date: 7/13/2018  Attending Physician: Florencia Argueta*   Primary Care Provider: [facility] Medicine Team: [facility]OSP MED 4 Casandra Mckeon MD     Patient information was obtained from patient, relative(s) and ER records.     Subjective:     Principal Problem:Perinephric hematoma    Chief Complaint:   Chief Complaint   Patient presents with    Transfer     bleeding right kidney, non traumatic, patient takes coumadin        HPI: Patient is a 61 yo F with significant past medical history of antiphospholipid syndrome, thyroid cancer, IBS, brain aneurysm, and chronic migraines who presents from [facility] ED in [place], [place] with severe right lower back and flank pain. The pain began last night at 11 pm. It progressively got worse. She did not try anything for the pain. Since last night, the pain has been constant. It radiates to the right abdomen. Patient's son states that she has not had this type of pain before. She endorses hematuria, HA, nausea, thirst, dry mouth. No PO intake since last night. She denies chest pain, SOB. CT at OSH showed perinephric hematoma. Outside labs show INR of 2.6, was 5 at [facility] ED. OSH labs wre WBC/10.9, H/H 13.1/37.6 with plt 234. CMP: calcium 8.1, Cr 0.8; glucose 145, Na 140, ALT 38, AST 30, CO2 25, K 4.6. One unit of blood was given in route to [facility]. Per patient's son, patient received morphine, dilaud, and phenergen at outside hospital. Completed 2 blood transfusions in [facility] ED. Patient lethargic after receiving morphine and Dilaud but awakens to verbal stimuli.     Speaking with patient and patient's son, patient is taking warfarin, synthroid, endaril, protonix, and lipitor. Unsure of dosing and frequency. Records from outside hospital may help to clarify home medications.     Hospital Course:    On the  hospital floor, code response team was called due to patient feeling lethargic, weak, and diaphoretic. Team went to see patient. Extremely lethargic but easily aroused.  Oriented X4 with no motor deficits noted.  BS performed revealed a BS of 258.   BP 94/65 RR 12 O2 94% on RA.  Placed on 2L NC. Awoke to verbal stimuli. Orientated to person, time, place. Labs including POCT glucose, ABG, lactic acid, CMP, CBC, PT/INR were ordered. Blood pressure started dropping: SBP running in the mid 90s and DBP in the mid 40s-low 50s. Bolus of saline and FFP were ordered and administered. Consulted ICU for potential admit due to concern of decreasing blood pressures and potential need for pressors.     Patient will be transferred to ICU for further management.     Past Medical History:   Diagnosis Date    Antiphospholipid antibody syndrome     Brain aneurysm     Cancer     GERD (gastroesophageal reflux disease)     Migraine headache     Thyroid disease        Past Surgical History:   Procedure Laterality Date    CHOLECYSTECTOMY      HERNIA REPAIR      KNEE SURGERY      THYROID SURGERY         Review of patient's allergies indicates:   Allergen Reactions    Bactrim [sulfamethoxazole-trimethoprim]     Iodine and iodide containing products      According to pt's son, pt's neck swells up with iodine contrast exposure       No current facility-administered medications on file prior to encounter.      No current outpatient prescriptions on file prior to encounter.     Family History     None        Social History Main Topics    Smoking status: Never Smoker    Smokeless tobacco: Not on file    Alcohol use Not on file    Drug use: Unknown    Sexual activity: Not on file     Review of Systems   Constitutional: Positive for fatigue. Negative for chills and fever.   Respiratory: Negative for cough and shortness of breath.    Cardiovascular: Negative for chest pain and leg swelling.   Gastrointestinal: Positive for  abdominal pain. Negative for abdominal distention.   Genitourinary: Positive for flank pain and hematuria.   Musculoskeletal: Positive for back pain.   Skin: Positive for pallor. Negative for rash.   Neurological: Negative for dizziness and headaches.   Psychiatric/Behavioral: Negative for agitation and behavioral problems.     Objective:     Vital Signs (Most Recent):  Temp: 97.7 °F (36.5 °C) (07/13/18 1907)  Pulse: 99 (07/13/18 1907)  Resp: (!) 23 (07/13/18 1907)  BP: (!) 108/56 (07/13/18 1907)  SpO2: 99 % (07/13/18 1907) Vital Signs (24h Range):  Temp:  [97.5 °F (36.4 °C)-98.7 °F (37.1 °C)] 97.7 °F (36.5 °C)  Pulse:  [] 99  Resp:  [14-30] 23  SpO2:  [94 %-100 %] 99 %  BP: ()/(45-94) 108/56     Weight: 104 kg (229 lb 4.5 oz)  Body mass index is 34.86 kg/m².    Physical Exam   Constitutional: She is oriented to person, place, and time. She appears well-developed and well-nourished.   HENT:   Head: Normocephalic and atraumatic.   Cardiovascular: Normal rate and regular rhythm.    Abdominal: Soft. Bowel sounds are normal. She exhibits no distension. There is tenderness (right upper and lower quadrant).   Musculoskeletal: She exhibits tenderness (lower right back/flank). She exhibits no edema.   Neurological: She is oriented to person, place, and time.   Skin: Skin is warm and dry. There is pallor.   Psychiatric: She has a normal mood and affect. Her behavior is normal.           Significant Labs:   CBC:   Recent Labs  Lab 07/13/18  1404 07/13/18  1831   WBC 16.68* 21.25*   HGB 11.0* 10.7*   HCT 35.1* 32.5*    221     CMP:   Recent Labs  Lab 07/13/18  1404      K 5.7*   *   CO2 18*   *   BUN 21   CREATININE 1.2   CALCIUM 8.0*   PROT 6.0   ALBUMIN 2.9*   BILITOT 1.1*   ALKPHOS 94   AST 58*   ALT 43   ANIONGAP 9   EGFRNONAA 48.9*     Coagulation:   Recent Labs  Lab 07/13/18  1404 07/13/18  1831   INR 5.0* 4.4*   APTT 56.8*  --      POCT Glucose:   Recent Labs  Lab 07/13/18  1827    POCTGLUCOSE 248*       Significant Imaging:     Outside CT abdomen demonstrated perinephric hematoma       Assessment/Plan:     * Perinephric hematoma    - Patient admitted from outside hospital (will need to obtain records) with right lower flank and back pain. CT abdomen at outside hospital showed perinephric hematoma  - Hx of antiphospholipid syndrome  - INR 5.0 on admission and on repeat 4 hours later was 4.4; given vitamin K in ED  - Received 2U of blood total since transfer; received FFP and bolus of 1L saline  - Urology and IR consulted  - Due to dropping blood pressures (SBP in mid 80s with DBP in mid 50s) and potential need for pressors, admitted to ICU for further care          Flank pain    - CT at outside hospital showed perinephric hematoma  - Hx of antiphospholipid syndrome; on coumadin  - Received morphine injection in ED  - Continue to assess        Hyperkalemia    - K+ on admission to ED 5.7  - ED gave insulin and dextrose  - Recheck K+ with BMP  - Cardiac monitoring         Hypothyroidism    - Patient with reported history of thyroid cancer; patient and son report that patient takes synthroid; dose unknown  - TSH ordered  - Please restart synthroid or titrate dose             VTE Risk Mitigation         Ordered     Place MARYJO hose  Until discontinued      07/13/18 1500     IP VTE LOW RISK PATIENT  Once      07/13/18 1500             Casandra Mckeon MD PGY1  Department of Hospital Medicine   [facility]

## 2018-07-14 NOTE — ASSESSMENT & PLAN NOTE
- K+ on admission to ED 5.7  - ED gave insulin and dextrose  - Recheck K+ with BMP  - Cardiac monitoring

## 2018-07-14 NOTE — ASSESSMENT & PLAN NOTE
- Patient admitted from outside hospital (will need to obtain records) with right lower flank and back pain. CT abdomen at outside hospital showed perinephric hematoma  - Hx of antiphospholipid syndrome  - INR 5.0 on admission and on repeat 4 hours later was 4.4; given vitamin K in ED  - Received 2U of blood total since transfer; received FFP and bolus of 1L saline  - Urology and IR consulted  - Due to dropping blood pressures (SBP in mid 80s with DBP in mid 50s) and potential need for pressors, admitted to ICU for further care

## 2018-07-14 NOTE — ASSESSMENT & PLAN NOTE
- Patient admitted from outside hospital with right lower flank and back pain  -CT abdomen at outside hospital showed perinephric hematoma  - Hx of antiphospholipid syndrome  - INR 5.0 on admission and on repeat 4 hours later was 4.4; given vitamin K in ED  - Received 2U of blood total since transfer; received FFP and bolus of 1L saline  - Due to dropping blood pressures (SBP in mid 80s with DBP in mid 50s) admitted to ICU   -since admit to the ICU she has had adequate BP with SBP >120 and MAPs >65  - Urology and IR consulted, but do not feel that there is an intervention needed at this time  -Plans to get CTA (load with hydrocrotisone 200 and IV benadryl 50; patient denies anaphylaxis reaction) however, patients MAURO precluded us from getting the study.  Given that her Hg was starting to stabilize, we did not pursue this further  -CBC and INR has been monitored q4h.  Latest hg was 10.5 and INR was 1.6  -will continue to check q4h but likely a venous bleed that has tamponaded with the correction in INR

## 2018-07-14 NOTE — CONSULTS
Critical care medicine consulted for retroperitoneal bleed on Ms. Emiliana Persaud.  Patient will be transferred to ICU for higher level of care. Please see full H&P to follow.    Mikayla Matute MD  Internal Medicine, PGY3  Pager [phone]

## 2018-07-14 NOTE — ASSESSMENT & PLAN NOTE
-patients Cr increased to 1.4 and then 1.6 (no kidney issues on baseline)  -most likely 2/2 to hypoperfusion  -expect this to resolve with fluids and  Blood products  -getting urine studies to r/o other causes of MAURO

## 2018-07-14 NOTE — SUBJECTIVE & OBJECTIVE
Interval History:   Had episode of hypotension yesterday and mental status change and drop in H&H  Was transferred to ICU   Transfused 2 units PRBCs, 2 FFP, 2L  Now stable, trending CBCs Q4  INR corrected to 1.4    Review of Systems   Constitutional: Positive for fatigue. Negative for activity change, appetite change, chills and fever.   HENT: Negative.    Eyes: Negative.    Respiratory: Negative for chest tightness and shortness of breath.    Cardiovascular: Negative for chest pain.   Gastrointestinal: Positive for abdominal pain. Negative for abdominal distention, nausea and vomiting.   Genitourinary: Positive for flank pain. Negative for difficulty urinating, dysuria and hematuria.   Skin: Negative.    Neurological: Negative.  Negative for dizziness.   Psychiatric/Behavioral: Negative.      Objective:     Temp:  [97.5 °F (36.4 °C)-99.1 °F (37.3 °C)] 98.9 °F (37.2 °C)  Pulse:  [] 124  Resp:  [15-31] 24  SpO2:  [92 %-100 %] 93 %  BP: ()/(45-94) 151/86     Body mass index is 34.86 kg/m².      Date 07/14/18 0700 - 07/15/18 0659   Shift 2716-5666 6570-0226 7364-7779 24 Hour Total   I  N  T  A  K  E   Shift Total  (mL/kg)       O  U  T  P  U  T   Urine  (mL/kg/hr) 135   135    Shift Total  (mL/kg) 135  (1.3)   135  (1.3)   Weight (kg) 104 104 104 104     Bladder Scan Volume (mL): 200 mL (07/13/18 2115)    Drains     Drain                 Urethral Catheter 07/13/18 2230 Latex less than 1 day                Physical Exam   Constitutional: No distress.   HENT:   Head: Normocephalic and atraumatic.   Eyes: Conjunctivae are normal. No scleral icterus.   Neck: Normal range of motion.   Cardiovascular: Normal rate.    Pulmonary/Chest: Effort normal. No respiratory distress.   Abdominal: Soft. She exhibits no distension. There is tenderness. There is no rebound and no guarding.   Right upper and lower quadrant tenderness  No left sided tenderness  Right CVAT  Cholecystectomy and hernia repair scars well healed    Musculoskeletal: Normal range of motion.   Neurological: She is alert.   Skin: Skin is warm and dry. She is not diaphoretic.     Psychiatric: She has a normal mood and affect.       Significant Labs:    BMP:    Recent Labs  Lab 07/13/18 2000 07/13/18 2345 07/14/18 0257    138 139   K 4.7 4.5 4.6   * 110 111*   CO2 18* 18* 16*   BUN 25* 27* 28*   CREATININE 1.6* 1.6* 1.6*   CALCIUM 8.0* 8.2* 8.4*       CBC:     Recent Labs  Lab 07/13/18 2345 07/14/18 0257 07/14/18  0738   WBC 17.64* 20.28* 21.40*   HGB 10.5* 10.6* 9.7*   HCT 31.5* 30.7* 29.5*    189 173       Coagulation:   Recent Labs  Lab 07/13/18 2345 07/14/18 0257 07/14/18  0738   INR 1.6* 1.4* 1.4*   APTT 46.4* 42.7* 40.9*     All pertinent labs results from the past 24 hours have been reviewed.    Significant Imaging:  All pertinent imaging results/findings from the past 24 hours have been reviewed.

## 2018-07-15 ENCOUNTER — SURGERY (OUTPATIENT)
Age: 63
End: 2018-07-15

## 2018-07-15 PROBLEM — D72.829 LEUKOCYTOSIS: Status: ACTIVE | Noted: 2018-07-15

## 2018-07-15 PROBLEM — R74.01 TRANSAMINITIS: Status: ACTIVE | Noted: 2018-07-15

## 2018-07-15 PROBLEM — D62 ACUTE BLOOD LOSS ANEMIA: Status: ACTIVE | Noted: 2018-07-15

## 2018-07-15 PROBLEM — D69.6 THROMBOCYTOPENIA: Status: ACTIVE | Noted: 2018-07-15

## 2018-07-15 PROBLEM — D68.61 ANTIPHOSPHOLIPID SYNDROME: Status: ACTIVE | Noted: 2018-07-15

## 2018-07-15 LAB
ALBUMIN SERPL BCP-MCNC: 2.6 G/DL
ALBUMIN SERPL BCP-MCNC: 2.6 G/DL
ALBUMIN SERPL BCP-MCNC: 2.7 G/DL
ALP SERPL-CCNC: 78 U/L
ALP SERPL-CCNC: 78 U/L
ALP SERPL-CCNC: 88 U/L
ALT SERPL W/O P-5'-P-CCNC: 174 U/L
ALT SERPL W/O P-5'-P-CCNC: 185 U/L
ALT SERPL W/O P-5'-P-CCNC: 186 U/L
ANION GAP SERPL CALC-SCNC: 11 MMOL/L
ANION GAP SERPL CALC-SCNC: 8 MMOL/L
ANION GAP SERPL CALC-SCNC: 8 MMOL/L
APTT BLDCRRT: 30.1 SEC
APTT BLDCRRT: 32 SEC
APTT BLDCRRT: 34 SEC
APTT BLDCRRT: 34.9 SEC
APTT BLDCRRT: 34.9 SEC
APTT BLDCRRT: 36.8 SEC
AST SERPL-CCNC: 178 U/L
AST SERPL-CCNC: 204 U/L
AST SERPL-CCNC: 215 U/L
BASOPHILS # BLD AUTO: 0 K/UL
BASOPHILS # BLD AUTO: 0.01 K/UL
BASOPHILS # BLD AUTO: 0.01 K/UL
BASOPHILS # BLD AUTO: 0.02 K/UL
BASOPHILS NFR BLD: 0 %
BASOPHILS NFR BLD: 0.1 %
BILIRUB SERPL-MCNC: 2 MG/DL
BILIRUB SERPL-MCNC: 2 MG/DL
BILIRUB SERPL-MCNC: 2.8 MG/DL
BLD PROD TYP BPU: NORMAL
BLOOD UNIT EXPIRATION DATE: NORMAL
BLOOD UNIT TYPE CODE: 5100
BLOOD UNIT TYPE CODE: 5100
BLOOD UNIT TYPE CODE: 6200
BLOOD UNIT TYPE CODE: 6200
BLOOD UNIT TYPE: NORMAL
BUN SERPL-MCNC: 28 MG/DL
BUN SERPL-MCNC: 33 MG/DL
BUN SERPL-MCNC: 35 MG/DL
CALCIUM SERPL-MCNC: 8.3 MG/DL
CHLORIDE SERPL-SCNC: 111 MMOL/L
CHLORIDE SERPL-SCNC: 113 MMOL/L
CHLORIDE SERPL-SCNC: 113 MMOL/L
CO2 SERPL-SCNC: 19 MMOL/L
CO2 SERPL-SCNC: 19 MMOL/L
CO2 SERPL-SCNC: 21 MMOL/L
CODING SYSTEM: NORMAL
CREAT SERPL-MCNC: 1.3 MG/DL
CREAT SERPL-MCNC: 1.5 MG/DL
CREAT SERPL-MCNC: 1.6 MG/DL
D DIMER PPP IA.FEU-MCNC: 1.81 MG/L FEU
DIFFERENTIAL METHOD: ABNORMAL
DISPENSE STATUS: NORMAL
EOSINOPHIL # BLD AUTO: 0 K/UL
EOSINOPHIL NFR BLD: 0 %
EOSINOPHIL NFR BLD: 0.1 %
EOSINOPHIL NFR BLD: 0.1 %
ERYTHROCYTE [DISTWIDTH] IN BLOOD BY AUTOMATED COUNT: 13.8 %
ERYTHROCYTE [DISTWIDTH] IN BLOOD BY AUTOMATED COUNT: 13.8 %
ERYTHROCYTE [DISTWIDTH] IN BLOOD BY AUTOMATED COUNT: 13.9 %
ERYTHROCYTE [DISTWIDTH] IN BLOOD BY AUTOMATED COUNT: 13.9 %
ERYTHROCYTE [DISTWIDTH] IN BLOOD BY AUTOMATED COUNT: 14 %
ERYTHROCYTE [DISTWIDTH] IN BLOOD BY AUTOMATED COUNT: 14.5 %
ERYTHROCYTE [DISTWIDTH] IN BLOOD BY AUTOMATED COUNT: 14.5 %
EST. GFR  (AFRICAN AMERICAN): 39.8 ML/MIN/1.73 M^2
EST. GFR  (AFRICAN AMERICAN): 43 ML/MIN/1.73 M^2
EST. GFR  (AFRICAN AMERICAN): 51.2 ML/MIN/1.73 M^2
EST. GFR  (NON AFRICAN AMERICAN): 34.5 ML/MIN/1.73 M^2
EST. GFR  (NON AFRICAN AMERICAN): 37.3 ML/MIN/1.73 M^2
EST. GFR  (NON AFRICAN AMERICAN): 44.4 ML/MIN/1.73 M^2
FIBRINOGEN PPP-MCNC: 518 MG/DL
GLUCOSE SERPL-MCNC: 107 MG/DL
GLUCOSE SERPL-MCNC: 123 MG/DL
GLUCOSE SERPL-MCNC: 125 MG/DL
HAPTOGLOB SERPL-MCNC: 199 MG/DL
HCT VFR BLD AUTO: 19.3 %
HCT VFR BLD AUTO: 21.4 %
HCT VFR BLD AUTO: 21.6 %
HCT VFR BLD AUTO: 22.6 %
HCT VFR BLD AUTO: 23.2 %
HCT VFR BLD AUTO: 24.1 %
HCT VFR BLD AUTO: 25.6 %
HGB BLD-MCNC: 6.6 G/DL
HGB BLD-MCNC: 7.1 G/DL
HGB BLD-MCNC: 7.4 G/DL
HGB BLD-MCNC: 7.5 G/DL
HGB BLD-MCNC: 7.9 G/DL
HGB BLD-MCNC: 7.9 G/DL
HGB BLD-MCNC: 8.2 G/DL
IMM GRANULOCYTES # BLD AUTO: 0.13 K/UL
IMM GRANULOCYTES # BLD AUTO: 0.15 K/UL
IMM GRANULOCYTES # BLD AUTO: 0.16 K/UL
IMM GRANULOCYTES # BLD AUTO: 0.24 K/UL
IMM GRANULOCYTES # BLD AUTO: 0.26 K/UL
IMM GRANULOCYTES # BLD AUTO: 0.28 K/UL
IMM GRANULOCYTES # BLD AUTO: 0.3 K/UL
IMM GRANULOCYTES NFR BLD AUTO: 1.1 %
IMM GRANULOCYTES NFR BLD AUTO: 1.1 %
IMM GRANULOCYTES NFR BLD AUTO: 1.4 %
IMM GRANULOCYTES NFR BLD AUTO: 1.4 %
IMM GRANULOCYTES NFR BLD AUTO: 1.6 %
IMM GRANULOCYTES NFR BLD AUTO: 1.6 %
IMM GRANULOCYTES NFR BLD AUTO: 1.9 %
INR PPP: 1.1
INR PPP: 1.1
INR PPP: 1.2
LYMPHOCYTES # BLD AUTO: 0.8 K/UL
LYMPHOCYTES # BLD AUTO: 0.9 K/UL
LYMPHOCYTES # BLD AUTO: 1 K/UL
LYMPHOCYTES # BLD AUTO: 1 K/UL
LYMPHOCYTES # BLD AUTO: 1.1 K/UL
LYMPHOCYTES # BLD AUTO: 1.2 K/UL
LYMPHOCYTES # BLD AUTO: 1.2 K/UL
LYMPHOCYTES NFR BLD: 6 %
LYMPHOCYTES NFR BLD: 6.2 %
LYMPHOCYTES NFR BLD: 6.5 %
LYMPHOCYTES NFR BLD: 6.8 %
LYMPHOCYTES NFR BLD: 7.6 %
LYMPHOCYTES NFR BLD: 8.2 %
LYMPHOCYTES NFR BLD: 8.3 %
MAGNESIUM SERPL-MCNC: 1.8 MG/DL
MCH RBC QN AUTO: 29.5 PG
MCH RBC QN AUTO: 29.7 PG
MCH RBC QN AUTO: 29.9 PG
MCH RBC QN AUTO: 29.9 PG
MCH RBC QN AUTO: 30.4 PG
MCH RBC QN AUTO: 30.5 PG
MCH RBC QN AUTO: 30.7 PG
MCHC RBC AUTO-ENTMCNC: 32 G/DL
MCHC RBC AUTO-ENTMCNC: 32.8 G/DL
MCHC RBC AUTO-ENTMCNC: 33.2 G/DL
MCHC RBC AUTO-ENTMCNC: 33.2 G/DL
MCHC RBC AUTO-ENTMCNC: 34.1 G/DL
MCHC RBC AUTO-ENTMCNC: 34.2 G/DL
MCHC RBC AUTO-ENTMCNC: 34.3 G/DL
MCV RBC AUTO: 89 FL
MCV RBC AUTO: 90 FL
MCV RBC AUTO: 90 FL
MCV RBC AUTO: 91 FL
MCV RBC AUTO: 93 FL
MONOCYTES # BLD AUTO: 1.1 K/UL
MONOCYTES # BLD AUTO: 1.3 K/UL
MONOCYTES # BLD AUTO: 1.6 K/UL
MONOCYTES # BLD AUTO: 1.7 K/UL
MONOCYTES # BLD AUTO: 1.7 K/UL
MONOCYTES # BLD AUTO: 1.8 K/UL
MONOCYTES # BLD AUTO: 1.8 K/UL
MONOCYTES NFR BLD: 10 %
MONOCYTES NFR BLD: 10.2 %
MONOCYTES NFR BLD: 10.3 %
MONOCYTES NFR BLD: 10.7 %
MONOCYTES NFR BLD: 11.3 %
MONOCYTES NFR BLD: 11.5 %
MONOCYTES NFR BLD: 9.7 %
NEUTROPHILS # BLD AUTO: 11.3 K/UL
NEUTROPHILS # BLD AUTO: 12.6 K/UL
NEUTROPHILS # BLD AUTO: 12.8 K/UL
NEUTROPHILS # BLD AUTO: 14.1 K/UL
NEUTROPHILS # BLD AUTO: 14.8 K/UL
NEUTROPHILS # BLD AUTO: 8.9 K/UL
NEUTROPHILS # BLD AUTO: 9.9 K/UL
NEUTROPHILS NFR BLD: 79.1 %
NEUTROPHILS NFR BLD: 80.2 %
NEUTROPHILS NFR BLD: 80.5 %
NEUTROPHILS NFR BLD: 81.2 %
NEUTROPHILS NFR BLD: 81.2 %
NEUTROPHILS NFR BLD: 81.7 %
NEUTROPHILS NFR BLD: 81.9 %
NRBC BLD-RTO: 0 /100 WBC
PHOSPHATE SERPL-MCNC: 3.4 MG/DL
PLATELET # BLD AUTO: 40 K/UL
PLATELET # BLD AUTO: 46 K/UL
PLATELET # BLD AUTO: 48 K/UL
PLATELET # BLD AUTO: 62 K/UL
PLATELET # BLD AUTO: 68 K/UL
PLATELET # BLD AUTO: 81 K/UL
PLATELET # BLD AUTO: 84 K/UL
PMV BLD AUTO: 10.3 FL
PMV BLD AUTO: 10.5 FL
PMV BLD AUTO: 10.5 FL
PMV BLD AUTO: 10.9 FL
PMV BLD AUTO: 11 FL
PMV BLD AUTO: 11.6 FL
PMV BLD AUTO: 9.5 FL
POTASSIUM SERPL-SCNC: 4.1 MMOL/L
POTASSIUM SERPL-SCNC: 4.3 MMOL/L
POTASSIUM SERPL-SCNC: 4.5 MMOL/L
PROCALCITONIN SERPL IA-MCNC: <0.02 NG/ML
PROT SERPL-MCNC: 5.5 G/DL
PROT SERPL-MCNC: 5.6 G/DL
PROT SERPL-MCNC: 5.9 G/DL
PROTHROMBIN TIME: 11.6 SEC
PROTHROMBIN TIME: 11.7 SEC
PROTHROMBIN TIME: 11.8 SEC
PROTHROMBIN TIME: 11.9 SEC
PROTHROMBIN TIME: 11.9 SEC
PROTHROMBIN TIME: 12.2 SEC
RBC # BLD AUTO: 2.17 M/UL
RBC # BLD AUTO: 2.41 M/UL
RBC # BLD AUTO: 2.43 M/UL
RBC # BLD AUTO: 2.51 M/UL
RBC # BLD AUTO: 2.57 M/UL
RBC # BLD AUTO: 2.64 M/UL
RBC # BLD AUTO: 2.76 M/UL
SODIUM SERPL-SCNC: 140 MMOL/L
SODIUM SERPL-SCNC: 141 MMOL/L
SODIUM SERPL-SCNC: 142 MMOL/L
TRANS ERYTHROCYTES VOL PATIENT: NORMAL ML
TRANS ERYTHROCYTES VOL PATIENT: NORMAL ML
TRANS PLATPHERESIS VOL PATIENT: NORMAL ML
TRANS PLATPHERESIS VOL PATIENT: NORMAL ML
TROPONIN I SERPL DL<=0.01 NG/ML-MCNC: 0.03 NG/ML
TROPONIN I SERPL DL<=0.01 NG/ML-MCNC: 0.06 NG/ML
VANCOMYCIN SERPL-MCNC: 28.7 UG/ML
WBC # BLD AUTO: 10.99 K/UL
WBC # BLD AUTO: 12.38 K/UL
WBC # BLD AUTO: 14.31 K/UL
WBC # BLD AUTO: 15.56 K/UL
WBC # BLD AUTO: 15.97 K/UL
WBC # BLD AUTO: 17.22 K/UL
WBC # BLD AUTO: 18.01 K/UL

## 2018-07-15 PROCEDURE — 25000003 PHARM REV CODE 250: Performed by: NURSE PRACTITIONER

## 2018-07-15 PROCEDURE — 36430 TRANSFUSION BLD/BLD COMPNT: CPT

## 2018-07-15 PROCEDURE — 85598 HEXAGNAL PHOSPH PLTLT NEUTRL: CPT

## 2018-07-15 PROCEDURE — P9021 RED BLOOD CELLS UNIT: HCPCS

## 2018-07-15 PROCEDURE — 25000003 PHARM REV CODE 250: Performed by: STUDENT IN AN ORGANIZED HEALTH CARE EDUCATION/TRAINING PROGRAM

## 2018-07-15 PROCEDURE — 85610 PROTHROMBIN TIME: CPT | Mod: 91

## 2018-07-15 PROCEDURE — 85730 THROMBOPLASTIN TIME PARTIAL: CPT | Mod: 91

## 2018-07-15 PROCEDURE — 85060 BLOOD SMEAR INTERPRETATION: CPT | Mod: ,,, | Performed by: PATHOLOGY

## 2018-07-15 PROCEDURE — 85025 COMPLETE CBC W/AUTO DIFF WBC: CPT

## 2018-07-15 PROCEDURE — 63600175 PHARM REV CODE 636 W HCPCS: Performed by: STUDENT IN AN ORGANIZED HEALTH CARE EDUCATION/TRAINING PROGRAM

## 2018-07-15 PROCEDURE — 25500020 PHARM REV CODE 255: Performed by: INTERNAL MEDICINE

## 2018-07-15 PROCEDURE — 86146 BETA-2 GLYCOPROTEIN ANTIBODY: CPT | Mod: 59

## 2018-07-15 PROCEDURE — 99223 1ST HOSP IP/OBS HIGH 75: CPT | Mod: ,,, | Performed by: INTERNAL MEDICINE

## 2018-07-15 PROCEDURE — 85379 FIBRIN DEGRADATION QUANT: CPT

## 2018-07-15 PROCEDURE — P9035 PLATELET PHERES LEUKOREDUCED: HCPCS

## 2018-07-15 PROCEDURE — 27000221 HC OXYGEN, UP TO 24 HOURS

## 2018-07-15 PROCEDURE — 63600175 PHARM REV CODE 636 W HCPCS: Performed by: NURSE PRACTITIONER

## 2018-07-15 PROCEDURE — S0028 INJECTION, FAMOTIDINE, 20 MG: HCPCS | Performed by: NURSE PRACTITIONER

## 2018-07-15 PROCEDURE — 84100 ASSAY OF PHOSPHORUS: CPT

## 2018-07-15 PROCEDURE — 85384 FIBRINOGEN ACTIVITY: CPT

## 2018-07-15 PROCEDURE — 85613 RUSSELL VIPER VENOM DILUTED: CPT

## 2018-07-15 PROCEDURE — 94761 N-INVAS EAR/PLS OXIMETRY MLT: CPT

## 2018-07-15 PROCEDURE — 93010 ELECTROCARDIOGRAM REPORT: CPT | Mod: ,,, | Performed by: INTERNAL MEDICINE

## 2018-07-15 PROCEDURE — 84145 PROCALCITONIN (PCT): CPT

## 2018-07-15 PROCEDURE — C9113 INJ PANTOPRAZOLE SODIUM, VIA: HCPCS | Performed by: HOSPITALIST

## 2018-07-15 PROCEDURE — 83010 ASSAY OF HAPTOGLOBIN QUANT: CPT

## 2018-07-15 PROCEDURE — 86147 CARDIOLIPIN ANTIBODY EA IG: CPT | Mod: 59

## 2018-07-15 PROCEDURE — 63600175 PHARM REV CODE 636 W HCPCS: Performed by: HOSPITALIST

## 2018-07-15 PROCEDURE — 83735 ASSAY OF MAGNESIUM: CPT

## 2018-07-15 PROCEDURE — 80053 COMPREHEN METABOLIC PANEL: CPT | Mod: 91

## 2018-07-15 PROCEDURE — 99291 CRITICAL CARE FIRST HOUR: CPT | Mod: ,,, | Performed by: INTERNAL MEDICINE

## 2018-07-15 PROCEDURE — 84484 ASSAY OF TROPONIN QUANT: CPT | Mod: 91

## 2018-07-15 PROCEDURE — 86148 ANTI-PHOSPHOLIPID ANTIBODY: CPT | Mod: 91

## 2018-07-15 PROCEDURE — 80053 COMPREHEN METABOLIC PANEL: CPT

## 2018-07-15 PROCEDURE — 93005 ELECTROCARDIOGRAM TRACING: CPT

## 2018-07-15 PROCEDURE — 80202 ASSAY OF VANCOMYCIN: CPT

## 2018-07-15 PROCEDURE — 20000000 HC ICU ROOM

## 2018-07-15 PROCEDURE — 25000003 PHARM REV CODE 250: Performed by: HOSPITALIST

## 2018-07-15 RX ORDER — FENTANYL CITRATE 50 UG/ML
50 INJECTION, SOLUTION INTRAMUSCULAR; INTRAVENOUS
Status: DISCONTINUED | OUTPATIENT
Start: 2018-07-15 | End: 2018-07-17

## 2018-07-15 RX ORDER — SUCCINYLCHOLINE CHLORIDE 20 MG/ML
INJECTION INTRAMUSCULAR; INTRAVENOUS
Status: COMPLETED
Start: 2018-07-15 | End: 2018-07-15

## 2018-07-15 RX ORDER — PROPOFOL 10 MG/ML
INJECTION, EMULSION INTRAVENOUS
Status: COMPLETED
Start: 2018-07-15 | End: 2018-07-15

## 2018-07-15 RX ORDER — FAMOTIDINE 10 MG/ML
40 INJECTION INTRAVENOUS ONCE
Status: COMPLETED | OUTPATIENT
Start: 2018-07-15 | End: 2018-07-15

## 2018-07-15 RX ORDER — FENTANYL CITRATE 50 UG/ML
25 INJECTION, SOLUTION INTRAMUSCULAR; INTRAVENOUS EVERY 4 HOURS PRN
Status: DISCONTINUED | OUTPATIENT
Start: 2018-07-15 | End: 2018-07-15

## 2018-07-15 RX ORDER — HYDROCODONE BITARTRATE AND ACETAMINOPHEN 500; 5 MG/1; MG/1
TABLET ORAL
Status: DISCONTINUED | OUTPATIENT
Start: 2018-07-15 | End: 2018-07-16

## 2018-07-15 RX ORDER — SODIUM CHLORIDE, SODIUM LACTATE, POTASSIUM CHLORIDE, CALCIUM CHLORIDE 600; 310; 30; 20 MG/100ML; MG/100ML; MG/100ML; MG/100ML
INJECTION, SOLUTION INTRAVENOUS CONTINUOUS
Status: DISCONTINUED | OUTPATIENT
Start: 2018-07-15 | End: 2018-07-15

## 2018-07-15 RX ORDER — FENTANYL CITRATE 50 UG/ML
25 INJECTION, SOLUTION INTRAMUSCULAR; INTRAVENOUS
Status: DISCONTINUED | OUTPATIENT
Start: 2018-07-15 | End: 2018-07-15

## 2018-07-15 RX ORDER — ETOMIDATE 2 MG/ML
INJECTION INTRAVENOUS
Status: COMPLETED
Start: 2018-07-15 | End: 2018-07-15

## 2018-07-15 RX ORDER — METOPROLOL TARTRATE 1 MG/ML
2.5 INJECTION, SOLUTION INTRAVENOUS ONCE
Status: COMPLETED | OUTPATIENT
Start: 2018-07-15 | End: 2018-07-15

## 2018-07-15 RX ORDER — ROCURONIUM BROMIDE 10 MG/ML
INJECTION, SOLUTION INTRAVENOUS
Status: COMPLETED
Start: 2018-07-15 | End: 2018-07-15

## 2018-07-15 RX ORDER — FENTANYL CITRATE 50 UG/ML
50 INJECTION, SOLUTION INTRAMUSCULAR; INTRAVENOUS EVERY 4 HOURS PRN
Status: DISCONTINUED | OUTPATIENT
Start: 2018-07-15 | End: 2018-07-15

## 2018-07-15 RX ORDER — DIPHENHYDRAMINE HYDROCHLORIDE 50 MG/ML
50 INJECTION INTRAMUSCULAR; INTRAVENOUS ONCE
Status: COMPLETED | OUTPATIENT
Start: 2018-07-15 | End: 2018-07-15

## 2018-07-15 RX ORDER — METHYLPREDNISOLONE SOD SUCC 125 MG
125 VIAL (EA) INJECTION ONCE
Status: DISCONTINUED | OUTPATIENT
Start: 2018-07-15 | End: 2018-07-15

## 2018-07-15 RX ADMIN — PIPERACILLIN AND TAZOBACTAM 4.5 G: 4; .5 INJECTION, POWDER, LYOPHILIZED, FOR SOLUTION INTRAVENOUS; PARENTERAL at 09:07

## 2018-07-15 RX ADMIN — ONDANSETRON 4 MG: 2 INJECTION INTRAMUSCULAR; INTRAVENOUS at 05:07

## 2018-07-15 RX ADMIN — HYDROCORTISONE SODIUM SUCCINATE 200 MG: 100 INJECTION, POWDER, FOR SOLUTION INTRAMUSCULAR; INTRAVENOUS at 06:07

## 2018-07-15 RX ADMIN — FAMOTIDINE 40 MG: 10 INJECTION, SOLUTION INTRAVENOUS at 05:07

## 2018-07-15 RX ADMIN — PROCHLORPERAZINE EDISYLATE 10 MG: 5 INJECTION INTRAMUSCULAR; INTRAVENOUS at 02:07

## 2018-07-15 RX ADMIN — FENTANYL CITRATE 25 MCG: 50 INJECTION INTRAMUSCULAR; INTRAVENOUS at 10:07

## 2018-07-15 RX ADMIN — PIPERACILLIN AND TAZOBACTAM 4.5 G: 4; .5 INJECTION, POWDER, LYOPHILIZED, FOR SOLUTION INTRAVENOUS; PARENTERAL at 11:07

## 2018-07-15 RX ADMIN — ONDANSETRON 4 MG: 2 INJECTION INTRAMUSCULAR; INTRAVENOUS at 01:07

## 2018-07-15 RX ADMIN — HYDROCORTISONE SODIUM SUCCINATE 200 MG: 100 INJECTION, POWDER, FOR SOLUTION INTRAMUSCULAR; INTRAVENOUS at 09:07

## 2018-07-15 RX ADMIN — DEXTROSE 40 MG: 50 INJECTION, SOLUTION INTRAVENOUS at 08:07

## 2018-07-15 RX ADMIN — DIPHENHYDRAMINE HYDROCHLORIDE 50 MG: 50 INJECTION, SOLUTION INTRAMUSCULAR; INTRAVENOUS at 05:07

## 2018-07-15 RX ADMIN — SODIUM CHLORIDE, SODIUM LACTATE, POTASSIUM CHLORIDE, AND CALCIUM CHLORIDE 1000 ML: .6; .31; .03; .02 INJECTION, SOLUTION INTRAVENOUS at 12:07

## 2018-07-15 RX ADMIN — PIPERACILLIN AND TAZOBACTAM 4.5 G: 4; .5 INJECTION, POWDER, LYOPHILIZED, FOR SOLUTION INTRAVENOUS; PARENTERAL at 04:07

## 2018-07-15 RX ADMIN — FENTANYL CITRATE 25 MCG: 50 INJECTION INTRAMUSCULAR; INTRAVENOUS at 03:07

## 2018-07-15 RX ADMIN — FENTANYL CITRATE 50 MCG: 50 INJECTION INTRAMUSCULAR; INTRAVENOUS at 05:07

## 2018-07-15 RX ADMIN — MAGNESIUM SULFATE IN WATER 2 G: 40 INJECTION, SOLUTION INTRAVENOUS at 06:07

## 2018-07-15 RX ADMIN — METOPROLOL TARTRATE 2.5 MG: 1 INJECTION, SOLUTION INTRAVENOUS at 04:07

## 2018-07-15 RX ADMIN — FENTANYL CITRATE 25 MCG: 50 INJECTION INTRAMUSCULAR; INTRAVENOUS at 01:07

## 2018-07-15 RX ADMIN — SODIUM CHLORIDE, SODIUM LACTATE, POTASSIUM CHLORIDE, AND CALCIUM CHLORIDE: .6; .31; .03; .02 INJECTION, SOLUTION INTRAVENOUS at 08:07

## 2018-07-15 RX ADMIN — IOHEXOL 100 ML: 350 INJECTION, SOLUTION INTRAVENOUS at 08:07

## 2018-07-15 RX ADMIN — FENTANYL CITRATE 25 MCG: 50 INJECTION INTRAMUSCULAR; INTRAVENOUS at 08:07

## 2018-07-15 RX ADMIN — METOPROLOL TARTRATE 2.5 MG: 1 INJECTION, SOLUTION INTRAVENOUS at 10:07

## 2018-07-15 RX ADMIN — SODIUM CHLORIDE, SODIUM LACTATE, POTASSIUM CHLORIDE, AND CALCIUM CHLORIDE: .6; .31; .03; .02 INJECTION, SOLUTION INTRAVENOUS at 01:07

## 2018-07-15 RX ADMIN — FENTANYL CITRATE 25 MCG: 50 INJECTION INTRAMUSCULAR; INTRAVENOUS at 12:07

## 2018-07-15 RX ADMIN — SODIUM CHLORIDE, SODIUM LACTATE, POTASSIUM CHLORIDE, AND CALCIUM CHLORIDE 500 ML: .6; .31; .03; .02 INJECTION, SOLUTION INTRAVENOUS at 09:07

## 2018-07-15 NOTE — ASSESSMENT & PLAN NOTE
-patients Cr increased to 1.4 and then 1.6 (no kidney issues on baseline)  -most likely 2/2 to hypoperfusion  - Creatinine trend from 1.8 to 1.5 most recently

## 2018-07-15 NOTE — ASSESSMENT & PLAN NOTE
- On Coumadin at home (held for now)  - INR corrected (5 on admission - currently 1.2)   - AC will need to be resumed once stabilized and etiology of bleed can be determined  - F/U DIC panel  - Hx of antiphospholipid syndrome - Consult Heme/Onc for recs on plasmaphoresis as a potential treatment modality.   - F/U - DRVVT, Cardiolipin ab, Phosphatidylserine ab, Bete-2 glycoprotein ab, and Procal   - Haptoglobin - 199  - Fibrinogen - 518  - D-dimer quant - 1.81

## 2018-07-15 NOTE — ASSESSMENT & PLAN NOTE
- Patient admitted for retroperitoneal bleed; transferred to ICU for hemodynamic instability. Getting pRBC and platelet transfusions.   - 07/15/18, hematology service consulted for anemia, and thrombocytopenia in setting of acute blood loss and antiphospholipid syndrome   - Patient was seen and examined at the bedside. Patient continues to feel lethargic. She is hard of hearing on the right side. Endorses right flank pain and dyspnea at rest.   - Per patient, she was diagnosed with antiphospholipid syndrome 10 years ago as she was undergoing routine lab work prior to brain irradiation. About 2-3 years ago, she had a deep venous thrombosis in the right leg and was started on warfarin 5 mg daily. Her last warfarin dose was on 07/12/18, and she followed up at coumadin clinic on 07/11/18 with INR 2.6.      Recommendations:   1) Given patient has antiphospholipid syndrome, and is dyspneic, tachycardic with elevated D Dimer. Possible suspicion of pulmonary embolism. Consider VQ Scan.  2) Consider additional imaging to evaluate for the acute blood loss and the source, and to evaluate interval change of the hematoma and recommend immediate intervention if expansion of the hematoma is noted on the radiologic imaging.

## 2018-07-15 NOTE — HOSPITAL COURSE
Patient admitted for retroperitoneal bleed; currently transferred to medical ICU for hypotension. Hematology consulted for anemia, thrombocytopenia in setting of acute blood loss and antiphospholipid syndrome

## 2018-07-15 NOTE — ASSESSMENT & PLAN NOTE
Anemia is likely due to acute blood loss; possible site is retroperitoneum  - In am the Hb 8.2; down to 7.5 (07/15). Upon arrival Hb 10.5. Continue serial CBC monitoring  - Continue pRBC transfusions for Hb < 7.0 or suspicion of active bleed. Patient is currently s/p transfusion of 7 units pRBC and 2 FFP's; and 1 platelets  - Patient received 2 units pRBC and 1 platelets ( 07/15/18)  - Given patient was hemodynamically unstable upon transfer to ICU and continues to be tachycardic, very concerning for active blood loss.   - At this point, Haptoglobin level is 199 ( > 10), less likely that patient is undergoing hemolysis.   - Highly suggest the primary team to evaluate for acute blood loss and interval change of retroperitoneal hematoma and intervene if necessary

## 2018-07-15 NOTE — PROGRESS NOTES
[facility]  Critical Care Medicine  Progress Note    Patient Name: Emiliana Persaud  MRN: [id number]  Admission Date: 7/13/2018  Hospital Length of Stay: 2 days  Code Status: Full Code  Attending Provider: Sharon Kasper MD  Primary Care Provider: [facility]   Principal Problem: Perinephric hematoma    Subjective:     HPI:   Patient is a 63 yo F with significant past medical history of antiphospholipid syndrome, thyroid cancer, IBS, brain aneurysm, (R) acoustic neuroma, RLE DVT (resolved) and chronic migraines who presents from [facility] ED in [place], [place] with concerns of a perinephric hematoma.    Patient states that the pain began 7/12 at 11 pm. It progressively got worse. She did not try anything for the pain. The pain has been constant. It radiates to the right abdomen. Patient endorsed hematuria, HA, nausea, thirst, dry mouth. he denies chest pain, SOB. CT at OSH showed perinephric hematoma. Outside labs show INR of 2.6.  OSH labs wre WBC/10.9, H/H 13.1/37.6  One unit of blood was given in route to [facility]. Patient received morphine, dilaudid, and phenergen at outside hospital.     Patient completed 2 blood transfusions in [facility] ED.  At that time, patient was admitted to hospital medicine.  Hg was 10.7 s/p 2 U.  Urology and IR consulted for retroperitoneal bleed.  They stated that there was intervention needed at this time.  In the evening of 7/13, code response team was called due to patient feeling lethargic, weak, and diaphoretic. Team went to see patient. Extremely lethargic but easily aroused.  Oriented X4 with no motor deficits noted.  BS performed revealed a BS of 258.   BP 94/65 RR 12 O2 94% on RA.  Placed on 2L NC.  Labs including POCT glucose, ABG, lactic acid, CMP, CBC, PT/INR were ordered. Blood pressure started dropping: SBP running in the mid 90s and DBP in the mid 40s-low 50s. Bolus of saline and FFP were ordered and administered.      Consulted ICU for hypotension.     Hospital/ICU Course:  07/14 - 61 yo F with PMHx antiphospholipid syndrome, RLE DVT (resolved), right acoustic neuroma, thyroid ca, brain aneurysm admitted from OSH with spontaneous perinephric hematoma as denies trauma or recent surgical intervention. INR 5 on admission, s/p 3u PRBCs total.    - Due to dropping blood pressures (SBP in mid 80s with DBP in mid 50s) admitted to ICU   -since admit to the ICU she has had adequate BP with SBP >120 and MAPs >65  Urology and IR consulted overnight and following.  07/15 - 1 unit PRBC's administered. 1 Unit Platelets administered.    Interval History/Significant Events: Labs consistent with active bleeding. Pt being transfused appropriately. Will continue to monitor and treat appropriately. IR, Urology, and Heme/On following pt. Follow up on recs.     Review of Systems   Constitutional: Negative for chills and diaphoresis.   HENT: Negative for congestion, nosebleeds and sore throat.    Eyes: Negative for discharge and visual disturbance.   Respiratory: Positive for chest tightness and shortness of breath. Negative for wheezing.    Cardiovascular: Negative for chest pain and leg swelling.   Gastrointestinal: Positive for abdominal distention and abdominal pain. Negative for blood in stool and nausea.   Endocrine: Negative for cold intolerance and heat intolerance.   Genitourinary: Positive for flank pain. Negative for hematuria and urgency.   Musculoskeletal: Negative for myalgias and neck stiffness.   Skin: Negative for color change and wound.   Neurological: Negative for dizziness and facial asymmetry.   Hematological: Negative for adenopathy. Does not bruise/bleed easily.   Psychiatric/Behavioral: Negative for decreased concentration and suicidal ideas.     Objective:     Vital Signs (Most Recent):  Temp: 98.6 °F (37 °C) (07/15/18 1230)  Pulse: (!) 114 (07/15/18 1245)  Resp: (!) 23 (07/15/18 1245)  BP: (!) 145/66 (07/15/18 1200)  SpO2:  99 % (07/15/18 1245) Vital Signs (24h Range):  Temp:  [98.6 °F (37 °C)-98.9 °F (37.2 °C)] 98.6 °F (37 °C)  Pulse:  [104-128] 114  Resp:  [22-36] 23  SpO2:  [93 %-100 %] 99 %  BP: (112-174)/(59-82) 145/66   Weight: 104 kg (229 lb 4.5 oz)  Body mass index is 34.86 kg/m².      Intake/Output Summary (Last 24 hours) at 07/15/18 1302  Last data filed at 07/15/18 1230   Gross per 24 hour   Intake             1992 ml   Output             1140 ml   Net              852 ml       Physical Exam   Constitutional: She is oriented to person, place, and time. She appears well-developed and well-nourished.   HENT:   Head: Normocephalic and atraumatic.   Eyes: Pupils are equal, round, and reactive to light.   Cardiovascular: Normal heart sounds.  Exam reveals no gallop and no friction rub.    No murmur heard.  Pulmonary/Chest: Breath sounds normal. No respiratory distress. She has no wheezes.   Abdominal: Soft. She exhibits distension. There is tenderness.   RUQ and RLQ tenderness   Musculoskeletal: Normal range of motion.   Neurological: She is alert and oriented to person, place, and time.   Skin: Skin is warm and dry.   Nursing note and vitals reviewed.      Vents:  Oxygen Concentration (%): 28 (07/13/18 2329)  Lines/Drains/Airways     Drain                 Urethral Catheter 07/13/18 2230 Latex 1 day          Peripheral Intravenous Line                 Peripheral IV - Single Lumen 07/13/18 1353 Right Forearm 1 day         Peripheral IV - Single Lumen 07/13/18 1831 Right Hand 1 day         Peripheral IV - Single Lumen 07/13/18 1902 Right Antecubital 1 day              Significant Labs:    CBC/Anemia Profile:    Recent Labs  Lab 07/15/18  0414 07/15/18  0737 07/15/18  1137   WBC 15.97* 17.22* 14.31*   HGB 7.1* 8.2* 7.5*   HCT 21.4* 25.6* 22.6*   PLT 68* 62* 46*   MCV 89 93 90   RDW 13.9 13.8 13.8        Chemistries:    Recent Labs  Lab 07/14/18  0257 07/14/18  1912 07/15/18  0414 07/15/18  0737    142 141 140   K 4.6 4.8 4.3  4.5   * 112* 111* 113*   CO2 16* 20* 19* 19*   BUN 28* 37* 35* 33*   CREATININE 1.6* 1.8* 1.6* 1.5*   CALCIUM 8.4* 8.7 8.3* 8.3*   ALBUMIN 3.0* 2.9* 2.6* 2.6*   PROT 6.2 6.0 5.5* 5.6*   BILITOT 1.9* 1.8* 2.0* 2.0*   ALKPHOS 82 77 78 78   * 203* 186* 185*   * 263* 215* 204*   MG 2.2 2.0 1.8  --    PHOS 4.0 4.3 3.4  --        Recent Lab Results       07/15/18  1200 07/15/18  1138 07/15/18  1137 07/15/18  0825 07/15/18  0737      Immature Granulocytes   1.1(H)  1.4(H)     Immature Grans (Abs)   0.16  Comment:  Mild elevation in immature granulocytes is non specific and   can be seen in a variety of conditions including stress response,   acute inflammation, trauma and pregnancy. Correlation with other   laboratory and clinical findings is essential.  (H)  0.24  Comment:  Mild elevation in immature granulocytes is non specific and   can be seen in a variety of conditions including stress response,   acute inflammation, trauma and pregnancy. Correlation with other   laboratory and clinical findings is essential.  (H)     Albumin     2.6(L)     Alkaline Phosphatase     78     ALT     185(H)     Anion Gap     8     aPTT  34.9  Comment:  aPTT therapeutic range = 39-69 seconds(H)   34.0  Comment:  aPTT therapeutic range = 39-69 seconds(H)     AST     204(H)     Baso #   0.01  0.02     Basophil%   0.1  0.1     Total Bilirubin     2.0  Comment:  For infants and newborns, interpretation of results should be based  on gestational age, weight and in agreement with clinical  observations.  Premature Infant recommended reference ranges:  Up to 24 hours.............<8.0 mg/dL  Up to 48 hours............<12.0 mg/dL  3-5 days..................<15.0 mg/dL  6-29 days.................<15.0 mg/dL  (H)     BUN, Bld     33(H)     Calcium     8.3(L)     Chloride     113(H)     CO2     19(L)     Creatinine     1.5(H)     D-Dimer  1.81  Comment:  The quantitative D-dimer assay should be used as an aid in   the diagnosis of  deep vein thrombosis and pulmonary embolism  in patients with the appropriate presentation and clinical  history. The upper limit of the reference interval and the clinical   cut off   point are identical. Causes of a positive (>0.50 mg/L FEU) D-Dimer   test  include, but are not limited to: DVT, PE, DIC, thrombolytic   therapy, anticoagulant therapy, recent surgery, trauma, or   pregnancy, disseminated malignancy, aortic aneurysm, cirrhosis,  and severe infection. False negative results may occur in   patients with distal DVT.  (H)        Differential Method   Automated  Automated     eGFR if      43.0(A)     eGFR if non      37.3  Comment:  Calculation used to obtain the estimated glomerular filtration  rate (eGFR) is the CKD-EPI equation.   (A)     Eos #   0.0  0.0     Eosinophil%   0.0  0.0     Fibrinogen  518(H)        Glucose     123(H)     Gran # (ANC)   11.3(H)  14.1(H)     Gran%   79.1(H)  81.7(H)     Haptoglobin 199         Hematocrit   22.6(L)  25.6(L)     Hemoglobin   7.5(L)  8.2(L)     Coumadin Monitoring INR  1.2  Comment:  Coumadin Therapy:  2.0 - 3.0 for INR for all indicators except mechanical heart valves  and antiphospholipid syndromes which should use 2.5 - 3.5.     1.2  Comment:  Coumadin Therapy:  2.0 - 3.0 for INR for all indicators except mechanical heart valves  and antiphospholipid syndromes which should use 2.5 - 3.5.       Lymph #   1.2  1.2     Lymph%   8.2(L)  6.8(L)     Magnesium          MCH   29.9  29.7     MCHC   33.2  32.0     MCV   90  93     Mono #   1.6(H)  1.7(H)     Mono%   11.5  10.0     MPV   10.5  10.5     nRBC   0  0     Phosphorus          Platelets   46(L)  62(L)     Potassium     4.5     Total Protein     5.6(L)     Protime  11.8   11.9     RBC   2.51(L)  2.76(L)     RDW   13.8  13.8     Sodium     140     Troponin I    0.058  Comment:  The reference interval for Troponin I represents the 99th percentile   cutoff   for our facility and is  consistent with 3rd generation assay   performance.  (H)      Vancomycin, Random          WBC   14.31(H)  17.22(H)                 07/15/18  0414 07/15/18  0218 07/15/18  0052 07/14/18  1912 07/14/18  1541      Immature Granulocytes 1.6(H) 1.6(H) 1.9(H) 0.9(H) 0.8(H)     Immature Grans (Abs) 0.26  Comment:  Mild elevation in immature granulocytes is non specific and   can be seen in a variety of conditions including stress response,   acute inflammation, trauma and pregnancy. Correlation with other   laboratory and clinical findings is essential.  (H) 0.28  Comment:  Mild elevation in immature granulocytes is non specific and   can be seen in a variety of conditions including stress response,   acute inflammation, trauma and pregnancy. Correlation with other   laboratory and clinical findings is essential.  (H) 0.30  Comment:  Mild elevation in immature granulocytes is non specific and   can be seen in a variety of conditions including stress response,   acute inflammation, trauma and pregnancy. Correlation with other   laboratory and clinical findings is essential.  (H) 0.20  Comment:  Mild elevation in immature granulocytes is non specific and   can be seen in a variety of conditions including stress response,   acute inflammation, trauma and pregnancy. Correlation with other   laboratory and clinical findings is essential.  (H) 0.19  Comment:  Mild elevation in immature granulocytes is non specific and   can be seen in a variety of conditions including stress response,   acute inflammation, trauma and pregnancy. Correlation with other   laboratory and clinical findings is essential.  (H)     Albumin 2.6(L)   2.9(L)      Alkaline Phosphatase 78   77      (H)   203(H)      Anion Gap 11   10      aPTT 32.0  Comment:  aPTT therapeutic range = 39-69 seconds  30.1  Comment:  aPTT therapeutic range = 39-69 seconds 38.5  Comment:  aPTT therapeutic range = 39-69 seconds(H) 40.5  Comment:  aPTT therapeutic range =  39-69 seconds(H)     (H)   263(H)      Baso # 0.02 0.02 0.02 0.02 0.02     Basophil% 0.1 0.1 0.1 0.1 0.1     Total Bilirubin 2.0  Comment:  For infants and newborns, interpretation of results should be based  on gestational age, weight and in agreement with clinical  observations.  Premature Infant recommended reference ranges:  Up to 24 hours.............<8.0 mg/dL  Up to 48 hours............<12.0 mg/dL  3-5 days..................<15.0 mg/dL  6-29 days.................<15.0 mg/dL  (H)   1.8  Comment:  For infants and newborns, interpretation of results should be based  on gestational age, weight and in agreement with clinical  observations.  Premature Infant recommended reference ranges:  Up to 24 hours.............<8.0 mg/dL  Up to 48 hours............<12.0 mg/dL  3-5 days..................<15.0 mg/dL  6-29 days.................<15.0 mg/dL  (H)      BUN, Bld 35(H)   37(H)      Calcium 8.3(L)   8.7      Chloride 111(H)   112(H)      CO2 19(L)   20(L)      Creatinine 1.6(H)   1.8(H)      D-Dimer          Differential Method Automated Automated Automated Automated Automated     eGFR if  39.8(A)   34.5(A)      eGFR if non  34.5  Comment:  Calculation used to obtain the estimated glomerular filtration  rate (eGFR) is the CKD-EPI equation.   (A)   29.9  Comment:  Calculation used to obtain the estimated glomerular filtration  rate (eGFR) is the CKD-EPI equation.   (A)      Eos # 0.0 0.0 0.0 0.0 0.0     Eosinophil% 0.0 0.0 0.1 0.0 0.0     Fibrinogen          Glucose 125(H)   125(H)      Gran # (ANC) 12.8(H) 14.8(H) 12.6(H) 17.1(H) 17.8(H)     Gran% 80.5(H) 81.9(H) 81.2(H) 78.2(H) 79.3(H)     Haptoglobin          Hematocrit 21.4(L) 21.6(L) 19.3  Comment:  HCT   critical result(s) called and verbal readback obtained from   Elvia Alejandre RN, 07/15/2018 01:46  (LL) 24.9(L) 26.2(L)     Hemoglobin 7.1(L) 7.4(L) 6.6(L) 8.8(L) 8.8(L)     Coumadin Monitoring INR 1.2  Comment:  Coumadin  Therapy:  2.0 - 3.0 for INR for all indicators except mechanical heart valves  and antiphospholipid syndromes which should use 2.5 - 3.5.    1.2  Comment:  Coumadin Therapy:  2.0 - 3.0 for INR for all indicators except mechanical heart valves  and antiphospholipid syndromes which should use 2.5 - 3.5.   1.3  Comment:  Coumadin Therapy:  2.0 - 3.0 for INR for all indicators except mechanical heart valves  and antiphospholipid syndromes which should use 2.5 - 3.5.  (H) 1.3  Comment:  Coumadin Therapy:  2.0 - 3.0 for INR for all indicators except mechanical heart valves  and antiphospholipid syndromes which should use 2.5 - 3.5.  (H)     Lymph # 1.0 1.1 0.9(L) 1.7 1.7     Lymph% 6.5(L) 6.2(L) 6.0(L) 7.7(L) 7.4(L)     Magnesium 1.8   2.0      MCH 29.5 30.5 30.4 30.9 29.7     MCHC 33.2 34.3 34.2 35.3 33.6     MCV 89 89 89 87 89     Mono # 1.8(H) 1.8(H) 1.7(H) 2.9(H) 2.8(H)     Mono% 11.3 10.2 10.7 13.1 12.4     MPV 10.9 11.0 11.6 11.2 11.4     nRBC 0 0 0 0 0     Phosphorus 3.4   4.3      Platelets 68(L) 81(L) 84(L) 157 158     Potassium 4.3   4.8      Total Protein 5.5(L)   6.0      Protime 11.9  12.2 12.8(H) 12.7(H)     RBC 2.41(L) 2.43(L) 2.17(L) 2.85(L) 2.96(L)     RDW 13.9 13.9 14.0 13.9 14.0     Sodium 141   142      Troponin I          Vancomycin, Random 28.7         WBC 15.97(H) 18.01(H) 15.56(H) 21.84(H) 22.42(H)                     Significant Imaging:  I have reviewed all pertinent imaging results/findings within the past 24 hours.    Assessment/Plan:     Neuro   Brain aneurysm    -patient reports that she has a brain aneurysm for which she takes lisinopril 20mg  -will hold in setting of MAURO and hypotension  -can restart when both resolve        Renal/   Hyperkalemia    -resolved        MAURO (acute kidney injury)    -patients Cr increased to 1.4 and then 1.6 (no kidney issues on baseline)  -most likely 2/2 to hypoperfusion  - Creatinine trend from 1.8 to 1.5 most recently        Hematology   Antiphospholipid  syndrome    - On Coumadin at home (held for now)  - INR corrected (5 on admission - currently 1.2)   - AC will need to be resumed once stabilized and etiology of bleed can be determined  - F/U DIC panel  - Hx of antiphospholipid syndrome - Consult Heme/Onc for recs on plasmaphoresis as a potential treatment modality.   - F/U - DRVVT, Cardiolipin ab, Phosphatidylserine ab, Bete-2 glycoprotein ab, and Procal   - Haptoglobin - 199  - Fibrinogen - 518  - D-dimer quant - 1.81  - F/U CBC with manual diff          Oncology   Leukocytosis    -  Procal pending  - Vanc D/C'd, continue Zosyn        Endocrine   Hypothyroidism    -patient is s/p thyroid CA and had a thyroidectomy  -she takes synthroid daily 150 mcg        GI   Transaminitis    rising LFTs - CBD at 7mm but no intrahepatic dilatation, will consider need for GI consult        Orthopedic   * Perinephric hematoma    - Perinephric hematoma: measuring [id number] with evidence of mass effect on right kidney as well as free fluid noted in pelvis on CT scan (unknown how this compares to outside CT as no records/CT scan were available to MICU team - Urology has previous CT in there possession and are following patient)     - Surgery consult given above findings including Intra-abdominal HTN -Bladder Pressure - 13    - Troponin I - 0.05 - Pt with resolved episode of chest tightness and shortness of breath  - F/U Repeat Troponin  - CXR on 7/14 and repeat on 7/15 - There are small bilateral pleural effusions.  There is bibasilar atelectasis (Pt on 6L nasal cannula sat'ing well, but requiring for symptomatic relief)    - Hx of antiphospholipid syndrome - Consult Heme/Onc for recs on plasmaphoresis as a potential treatment modality.     - INR 5.0 on admission and on repeat 4 hours later was 4.4; given vitamin K in ED  - INR corrected (currently 1.2) (7/15)    - Received 3U of blood total since transfer; received FFP and bolus of 1L saline x2    - Urology and IR consulted - Do  not feel that there is an intervention needed at this time  - Plans for potential CTA (load with hydrocrotisone 200 and IV benadryl 50; patient denies anaphylaxis reaction) however, patients MAURO precluded us from getting the study.      - Hg trend from 11 on admission to a current 7.5 despite 3 units of PRBC's here at [facility] and 1 prior to admission  -CBC and INR has been monitored q4h.  - Platelets trend from 259 on admission to 46. Pt now receiving platelets.               Eric Lara MD  Critical Care Medicine  [facility]

## 2018-07-15 NOTE — SUBJECTIVE & OBJECTIVE
Oncology Treatment Plan:   [No treatment plan]    Medications:  Continuous Infusions:  Scheduled Meds:   levothyroxine  150 mcg Oral Before breakfast    pantoprozole (PROTONIX) 40 mg/100 mL D5W IVPB  40 mg Intravenous Daily    piperacillin-tazobactam (ZOSYN) IVPB  4.5 g Intravenous Q8H    sodium chloride 0.9%  1,000 mL Intravenous Once     PRN Meds:sodium chloride, sodium chloride, sodium chloride, sodium chloride, sodium chloride, sodium chloride, sodium chloride, sodium chloride, acetaminophen, dextrose 50%, dextrose 50%, fentaNYL, glucagon (human recombinant), glucose, glucose, magnesium sulfate IVPB, magnesium sulfate IVPB, ondansetron, ondansetron, potassium chloride in water, potassium chloride in water, potassium chloride in water, prochlorperazine, promethazine, ramelteon, sodium chloride 0.9%, sodium chloride 0.9%, sodium phosphate IVPB, sodium phosphate IVPB, sodium phosphate IVPB     Review of patient's allergies indicates:   Allergen Reactions    Bactrim [sulfamethoxazole-trimethoprim]     Iodine and iodide containing products      According to pt's son, pt's neck swells up with iodine contrast exposure        Past Medical History:   Diagnosis Date    Antiphospholipid antibody syndrome     Brain aneurysm     Cancer     GERD (gastroesophageal reflux disease)     Migraine headache     Thyroid disease      Past Surgical History:   Procedure Laterality Date    CHOLECYSTECTOMY      HERNIA REPAIR      KNEE SURGERY      THYROID SURGERY       Family History     None        Social History Main Topics    Smoking status: Never Smoker    Smokeless tobacco: Not on file    Alcohol use Not on file    Drug use: Unknown    Sexual activity: Not on file       Review of Systems   Constitutional: Positive for activity change (restricted to bed due to acute pain) and fatigue. Negative for chills and fever.   HENT: Negative for congestion, facial swelling, sore throat, trouble swallowing and voice change.     Eyes: Negative for visual disturbance.   Respiratory: Positive for chest tightness and shortness of breath.    Cardiovascular: Negative for chest pain, palpitations and leg swelling.   Gastrointestinal: Negative for abdominal pain, blood in stool, constipation, diarrhea, nausea and vomiting.   Endocrine: Negative for polydipsia, polyphagia and polyuria.   Genitourinary: Positive for flank pain and hematuria. Negative for dysuria.   Musculoskeletal: Negative for back pain, joint swelling, myalgias and neck pain.   Skin: Negative for color change and wound.   Neurological: Negative for dizziness, syncope, speech difficulty, weakness, light-headedness and headaches.   Hematological: Negative for adenopathy.   Psychiatric/Behavioral: Negative for confusion.     Objective:     Vital Signs (Most Recent):  Temp: 98.8 °F (37.1 °C) (07/15/18 1500)  Pulse: (!) 114 (07/15/18 1500)  Resp: (!) 25 (07/15/18 1500)  BP: 137/67 (07/15/18 1500)  SpO2: 99 % (07/15/18 1500) Vital Signs (24h Range):  Temp:  [98.6 °F (37 °C)-98.9 °F (37.2 °C)] 98.8 °F (37.1 °C)  Pulse:  [105-128] 114  Resp:  [22-36] 25  SpO2:  [94 %-100 %] 99 %  BP: (112-164)/(59-77) 137/67     Weight: 104 kg (229 lb 4.5 oz)  Body mass index is 34.86 kg/m².  Body surface area is 2.23 meters squared.      Intake/Output Summary (Last 24 hours) at 07/15/18 1539  Last data filed at 07/15/18 1500   Gross per 24 hour   Intake             1992 ml   Output             1315 ml   Net              677 ml       Physical Exam   Constitutional: She is oriented to person, place, and time. She appears well-developed and well-nourished. No distress.   HENT:   Head: Normocephalic and atraumatic.   Mouth/Throat: Oropharynx is clear and moist.   Eyes: EOM are normal. Pupils are equal, round, and reactive to light. No scleral icterus.   Neck: Normal range of motion. Neck supple. No JVD present. No thyromegaly present.   Cardiovascular: Normal rate, regular rhythm, normal heart sounds and  intact distal pulses.  Exam reveals no gallop.    No murmur heard.  Pulmonary/Chest: Effort normal and breath sounds normal. She has no wheezes.   Abdominal: Soft. Bowel sounds are normal. She exhibits no distension and no mass. There is no guarding.   Musculoskeletal: Normal range of motion. She exhibits edema (1+ pitting edema bilaterally lower extremities). She exhibits no deformity.   Lymphadenopathy:     She has no cervical adenopathy.   Neurological: She is alert and oriented to person, place, and time. No cranial nerve deficit.   Skin: Skin is warm. Capillary refill takes less than 2 seconds. She is diaphoretic.   Psychiatric: She has a normal mood and affect.       Significant Labs:   CBC:   Recent Labs  Lab 07/15/18  0414 07/15/18  0737 07/15/18  1137   WBC 15.97* 17.22* 14.31*   HGB 7.1* 8.2* 7.5*   HCT 21.4* 25.6* 22.6*   PLT 68* 62* 46*   , CMP:   Recent Labs  Lab 07/14/18  1912 07/15/18  0414 07/15/18  0737    141 140   K 4.8 4.3 4.5   * 111* 113*   CO2 20* 19* 19*   * 125* 123*   BUN 37* 35* 33*   CREATININE 1.8* 1.6* 1.5*   CALCIUM 8.7 8.3* 8.3*   PROT 6.0 5.5* 5.6*   ALBUMIN 2.9* 2.6* 2.6*   BILITOT 1.8* 2.0* 2.0*   ALKPHOS 77 78 78   * 215* 204*   * 186* 185*   ANIONGAP 10 11 8   EGFRNONAA 29.9* 34.5* 37.3*   , Coagulation:   Recent Labs  Lab 07/15/18  0414 07/15/18  0737 07/15/18  1138   INR 1.2 1.2 1.2   APTT 32.0 34.0* 34.9*   , Haptoglobin:   Recent Labs  Lab 07/15/18  1200   HAPTOGLOBIN 199   ,  Recent Labs  Lab 07/13/18  2231   COLORU Tammy   APPEARANCEUA Hazy*   PHUR 5.0   SPECGRAV 1.030   PROTEINUA 1+*   GLUCUA 2+*   KETONESU Negative   BILIRUBINUA Negative   OCCULTUA 2+*   NITRITE Negative   UROBILINOGEN Negative   LEUKOCYTESUR Negative   RBCUA 6*   WBCUA 2   BACTERIA Moderate*   HYALINECASTS 38*       Diagnostic Results:  I have reviewed all pertinent imaging results/findings within the past 24 hours.     CT abdomen/Pelvis (07/14/18)    Large right  subcapsular hematoma with likely mass-effect on the right kidney.  Additionally, there is scattered mesenteric edema and free fluid as well as a fluid/fluid level within the pelvis suggestive of layering hemorrhage.    Moderate right pleural effusion and bibasilar consolidation/atelectasis.

## 2018-07-15 NOTE — SUBJECTIVE & OBJECTIVE
Interval History:     Hemoglobin drifted downward after fluid bolus/ initiation of continuous IVF   Complaining of mild SOB, no CP   +N/-V  Remains NPO  Orellana in place, irrigated by nurse overnight for concern of no output, irrigated easily     Review of Systems  Objective:     Temp:  [98.8 °F (37.1 °C)-98.9 °F (37.2 °C)] 98.8 °F (37.1 °C)  Pulse:  [104-140] 123  Resp:  [22-36] 23  SpO2:  [92 %-99 %] 96 %  BP: (126-174)/(60-89) 136/75     Body mass index is 34.86 kg/m².      Date 07/15/18 0700 - 07/16/18 0659   Shift 8376-9354 9851-5248 3033-7271 24 Hour Total   I  N  T  A  K  E   Shift Total  (mL/kg)       O  U  T  P  U  T   Urine  (mL/kg/hr) 45   45    Shift Total  (mL/kg) 45  (0.4)   45  (0.4)   Weight (kg) 104 104 104 104     Bladder Scan Volume (mL): 200 mL (07/13/18 2115)    Drains     Drain                 Urethral Catheter 07/13/18 2230 Latex 1 day                Physical Exam   Constitutional: No distress.   HENT:   Head: Normocephalic and atraumatic.   Eyes: Conjunctivae are normal. No scleral icterus.   Neck: Normal range of motion.   Cardiovascular: Normal rate.    Pulmonary/Chest: Effort normal. No respiratory distress.   Abdominal: Soft. She exhibits no distension. There is tenderness. There is no rebound and no guarding.   Right upper and lower quadrant tenderness  No left sided tenderness  Right CVAT  Cholecystectomy and hernia repair scars well healed   Musculoskeletal: Normal range of motion. She exhibits edema (trace BLE edma).   Neurological: She is alert.   Skin: Skin is warm and dry. She is not diaphoretic.     Psychiatric: She has a normal mood and affect.       Significant Labs:    BMP:    Recent Labs  Lab 07/14/18  1912 07/15/18  0414 07/15/18  0737    141 140   K 4.8 4.3 4.5   * 111* 113*   CO2 20* 19* 19*   BUN 37* 35* 33*   CREATININE 1.8* 1.6* 1.5*   CALCIUM 8.7 8.3* 8.3*       CBC:     Recent Labs  Lab 07/15/18  0218 07/15/18  0414 07/15/18  0737   WBC 18.01* 15.97* 17.22*    HGB 7.4* 7.1* 8.2*   HCT 21.6* 21.4* 25.6*   PLT 81* 68* 62*       All pertinent labs results from the past 24 hours have been reviewed.    Significant Imaging:  All pertinent imaging results/findings from the past 24 hours have been reviewed.

## 2018-07-15 NOTE — PROGRESS NOTES
[facility]  Urology  Progress Note    Patient Name: Emiliana Persaud  MRN: [id number]  Admission Date: 7/13/2018  Hospital Length of Stay: 2 days  Code Status: Full Code   Attending Provider: Allyssa Macias MD   Primary Care Physician: [facility]    Subjective:     HPI:  61yo F with history of antiphospholipid syndrome on warfarin who was brought to the ED as a transfer from [facility] for perinephric hematoma. Per her family, she woke in the middle of the night with severe right flank and abdominal pain. In the ED she was found to have a perinephric hematoma and an INR of 2.5. [facility] was [place] [facility] [place]. When she arrived to [facility] she had a Hgb of 11 and an INR of 5.0. Her vital signs were stable. She was currently receiving a 2nd unit of PRBCs in the ED.     She has no urologic surgical history. She has had a partial thyroidectomy, cholecystectomy with incisional hernia (s/p mesh repair) and radiation for an auditory nerve tumor. Her main complaints are fatigue and abdominal and flank pain.     Interval History:     Hemoglobin drifted downward after fluid bolus/ initiation of continuous IVF   Complaining of mild SOB, no CP   +N/-V  Remains NPO  Orellana in place, irrigated by nurse overnight for concern of no output, irrigated easily     Review of Systems  Objective:     Temp:  [98.8 °F (37.1 °C)-98.9 °F (37.2 °C)] 98.8 °F (37.1 °C)  Pulse:  [104-140] 123  Resp:  [22-36] 23  SpO2:  [92 %-99 %] 96 %  BP: (126-174)/(60-89) 136/75     Body mass index is 34.86 kg/m².      Date 07/15/18 0700 - 07/16/18 0659   Shift 3731-3996 4707-0978 4094-7588 24 Hour Total   I  N  T  A  K  E   Shift Total  (mL/kg)       O  U  T  P  U  T   Urine  (mL/kg/hr) 45   45    Shift Total  (mL/kg) 45  (0.4)   45  (0.4)   Weight (kg) 104 104 104 104     Bladder Scan Volume (mL): 200 mL (07/13/18 2115)    Drains     Drain                 Urethral Catheter 07/13/18 2230 Latex 1 day                 Physical Exam   Constitutional: No distress.   HENT:   Head: Normocephalic and atraumatic.   Eyes: Conjunctivae are normal. No scleral icterus.   Neck: Normal range of motion.   Cardiovascular: Normal rate.    Pulmonary/Chest: Effort normal. No respiratory distress.   Abdominal: Soft. She exhibits no distension. There is tenderness. There is no rebound and no guarding.   Right upper and lower quadrant tenderness  No left sided tenderness  Right CVAT  Cholecystectomy and hernia repair scars well healed   Musculoskeletal: Normal range of motion. She exhibits edema (trace BLE edma).   Neurological: She is alert.   Skin: Skin is warm and dry. She is not diaphoretic.     Psychiatric: She has a normal mood and affect.       Significant Labs:    BMP:    Recent Labs  Lab 07/14/18  1912 07/15/18  0414 07/15/18  0737    141 140   K 4.8 4.3 4.5   * 111* 113*   CO2 20* 19* 19*   BUN 37* 35* 33*   CREATININE 1.8* 1.6* 1.5*   CALCIUM 8.7 8.3* 8.3*       CBC:     Recent Labs  Lab 07/15/18  0218 07/15/18  0414 07/15/18  0737   WBC 18.01* 15.97* 17.22*   HGB 7.4* 7.1* 8.2*   HCT 21.6* 21.4* 25.6*   PLT 81* 68* 62*       All pertinent labs results from the past 24 hours have been reviewed.    Significant Imaging:  All pertinent imaging results/findings from the past 24 hours have been reviewed.                  Assessment/Plan:     * Perinephric hematoma    - Patient is hemodynamically stable s/p 5 units PRBCs, 2FFP, continue to trend CBCs  - continue to trend CBCs q 4 hours  - Continue Critical care admission  - maintain INR at normal level   - recommend platelet transfusion  - continue PBRC and FFP transfusions as needed   - continue bed rest   - Recommend removing chew catheter  - Recommend stopping IV antibiotics, suspicion for infection is low  - no surgical intervention at this time   - recommend incentive spirometry   - no further imaging needed at this time   - okay for full liquids today   Do not  send to interventional radiology for intervention without discussing with urology  - please call with questions or concerns.               VTE Risk Mitigation         Ordered     Place MARYJO hose  Until discontinued      07/13/18 1500     IP VTE LOW RISK PATIENT  Once      07/13/18 1500          Giovani Skaggs MD  Urology  [facility]

## 2018-07-15 NOTE — CONSULTS
[facility]  Hematology/Oncology  Consult Note    Patient Name: Emiliana Persaud  MRN: [id number]  Admission Date: 7/13/2018  Hospital Length of Stay: 2 days  Code Status: Full Code   Attending Provider: Sharon Kasper MD  Consulting Provider: Nato Sigala MD  Primary Care Physician: [facility]  Principal Problem:Perinephric hematoma    Inpatient consult to Hematology/Oncology  Consult performed by: NATO SIGALA  Consult ordered by: ERIK DAMIAN        Subjective:     HPI:  Mrs. Persaud is a 62 years old female with antiphospholipid syndrome being treated with warfarin 5 mg daily for 2 years, and history of thyroid cancer, brain aneurysm transferred from [facility] ED in [place], [place] with concerns of a perinephric hematoma. Patient symptoms initially began on 07/12/18 at 11 pm, when she started to have right sided flank pain radiating to the mid abdomen and progressively became worse to seek medical attention. Imaging consistent with perinephric hematoma with mesenteric edema.   Outside labs show INR of 2.6.  OSH labs wre WBC/10.9, H/H 13.1/37.6  One unit of blood was given in route to [facility].     On 07/13/18, patient arrived at [facility] main. Patient completed 2 blood transfusions in [facility] ED.  At that time, patient was admitted to hospital medicine.  Hg was 10.7 s/p 2 U.   INR noted to be 5.0. Urology and IR consulted for retroperitoneal bleed.  They stated that there was intervention needed at this time.  In the evening of 7/13, rapid response was activated due to patient feeling lethargic, weak, and diaphoretic. She was found to be lethargic but arousable.  Oriented X4 with no motor deficits noted.  BS performed revealed a BS of 258.   BP 94/65 RR 12 O2 94% on RA.  Placed on 2L NC. 3 units pRBC, and 2 FFP along with saline bolus administered and patient was transferred to ICU service.    07/15/18, hematology service consulted for anemia, and  thrombocytopenia in setting of acute blood loss and antiphospholipid syndrome Patient was seen and examined at the bedside. Patient continues to feel lethargic. She is hard of hearing on the right side. Endorses right flank pain and dyspnea at rest. Per patient, she was diagnosed with antiphospholipid syndrome 10 years ago as she was undergoing routine lab work prior to brain irradiation. About 2-3 years ago, she had a deep venous thrombosis in the right leg and was started on warfarin 5 mg daily. Her last warfarin dose was on 07/12/18, and she followed up at coumadin clinic on 07/11/18 with INR 2.6.    Oncology Treatment Plan:   [No treatment plan]    Medications:  Continuous Infusions:  Scheduled Meds:   levothyroxine  150 mcg Oral Before breakfast    pantoprozole (PROTONIX) 40 mg/100 mL D5W IVPB  40 mg Intravenous Daily    piperacillin-tazobactam (ZOSYN) IVPB  4.5 g Intravenous Q8H    sodium chloride 0.9%  1,000 mL Intravenous Once     PRN Meds:sodium chloride, sodium chloride, sodium chloride, sodium chloride, sodium chloride, sodium chloride, sodium chloride, sodium chloride, acetaminophen, dextrose 50%, dextrose 50%, fentaNYL, glucagon (human recombinant), glucose, glucose, magnesium sulfate IVPB, magnesium sulfate IVPB, ondansetron, ondansetron, potassium chloride in water, potassium chloride in water, potassium chloride in water, prochlorperazine, promethazine, ramelteon, sodium chloride 0.9%, sodium chloride 0.9%, sodium phosphate IVPB, sodium phosphate IVPB, sodium phosphate IVPB     Review of patient's allergies indicates:   Allergen Reactions    Bactrim [sulfamethoxazole-trimethoprim]     Iodine and iodide containing products      According to pt's son, pt's neck swells up with iodine contrast exposure        Past Medical History:   Diagnosis Date    Antiphospholipid antibody syndrome     Brain aneurysm     Cancer     GERD (gastroesophageal reflux disease)     Migraine headache     Thyroid  disease      Past Surgical History:   Procedure Laterality Date    CHOLECYSTECTOMY      HERNIA REPAIR      KNEE SURGERY      THYROID SURGERY       Family History     None        Social History Main Topics    Smoking status: Never Smoker    Smokeless tobacco: Not on file    Alcohol use Not on file    Drug use: Unknown    Sexual activity: Not on file       Review of Systems   Constitutional: Positive for activity change (restricted to bed due to acute pain) and fatigue. Negative for chills and fever.   HENT: Negative for congestion, facial swelling, sore throat, trouble swallowing and voice change.    Eyes: Negative for visual disturbance.   Respiratory: Positive for chest tightness and shortness of breath.    Cardiovascular: Negative for chest pain, palpitations and leg swelling.   Gastrointestinal: Negative for abdominal pain, blood in stool, constipation, diarrhea, nausea and vomiting.   Endocrine: Negative for polydipsia, polyphagia and polyuria.   Genitourinary: Positive for flank pain and hematuria. Negative for dysuria.   Musculoskeletal: Negative for back pain, joint swelling, myalgias and neck pain.   Skin: Negative for color change and wound.   Neurological: Negative for dizziness, syncope, speech difficulty, weakness, light-headedness and headaches.   Hematological: Negative for adenopathy.   Psychiatric/Behavioral: Negative for confusion.     Objective:     Vital Signs (Most Recent):  Temp: 98.8 °F (37.1 °C) (07/15/18 1500)  Pulse: (!) 114 (07/15/18 1500)  Resp: (!) 25 (07/15/18 1500)  BP: 137/67 (07/15/18 1500)  SpO2: 99 % (07/15/18 1500) Vital Signs (24h Range):  Temp:  [98.6 °F (37 °C)-98.9 °F (37.2 °C)] 98.8 °F (37.1 °C)  Pulse:  [105-128] 114  Resp:  [22-36] 25  SpO2:  [94 %-100 %] 99 %  BP: (112-164)/(59-77) 137/67     Weight: 104 kg (229 lb 4.5 oz)  Body mass index is 34.86 kg/m².  Body surface area is 2.23 meters squared.      Intake/Output Summary (Last 24 hours) at 07/15/18 1539  Last  data filed at 07/15/18 1500   Gross per 24 hour   Intake             1992 ml   Output             1315 ml   Net              677 ml       Physical Exam   Constitutional: She is oriented to person, place, and time. She appears well-developed and well-nourished. No distress.   HENT:   Head: Normocephalic and atraumatic.   Mouth/Throat: Oropharynx is clear and moist.   Eyes: EOM are normal. Pupils are equal, round, and reactive to light. No scleral icterus.   Neck: Normal range of motion. Neck supple. No JVD present. No thyromegaly present.   Cardiovascular: Normal rate, regular rhythm, normal heart sounds and intact distal pulses.  Exam reveals no gallop.    No murmur heard.  Pulmonary/Chest: Effort normal and breath sounds normal. She has no wheezes.   Abdominal: Soft. Bowel sounds are normal. She exhibits no distension and no mass. There is no guarding.   Musculoskeletal: Normal range of motion. She exhibits edema (1+ pitting edema bilaterally lower extremities). She exhibits no deformity.   Lymphadenopathy:     She has no cervical adenopathy.   Neurological: She is alert and oriented to person, place, and time. No cranial nerve deficit.   Skin: Skin is warm. Capillary refill takes less than 2 seconds. She is diaphoretic.   Psychiatric: She has a normal mood and affect.       Significant Labs:   CBC:   Recent Labs  Lab 07/15/18  0414 07/15/18  0737 07/15/18  1137   WBC 15.97* 17.22* 14.31*   HGB 7.1* 8.2* 7.5*   HCT 21.4* 25.6* 22.6*   PLT 68* 62* 46*   , CMP:   Recent Labs  Lab 07/14/18  1912 07/15/18  0414 07/15/18  0737    141 140   K 4.8 4.3 4.5   * 111* 113*   CO2 20* 19* 19*   * 125* 123*   BUN 37* 35* 33*   CREATININE 1.8* 1.6* 1.5*   CALCIUM 8.7 8.3* 8.3*   PROT 6.0 5.5* 5.6*   ALBUMIN 2.9* 2.6* 2.6*   BILITOT 1.8* 2.0* 2.0*   ALKPHOS 77 78 78   * 215* 204*   * 186* 185*   ANIONGAP 10 11 8   EGFRNONAA 29.9* 34.5* 37.3*   , Coagulation:   Recent Labs  Lab 07/15/18  0414  07/15/18  0737 07/15/18  1138   INR 1.2 1.2 1.2   APTT 32.0 34.0* 34.9*   , Haptoglobin:   Recent Labs  Lab 07/15/18  1200   HAPTOGLOBIN 199   ,  Recent Labs  Lab 07/13/18  2231   COLORU Tammy   APPEARANCEUA Hazy*   PHUR 5.0   SPECGRAV 1.030   PROTEINUA 1+*   GLUCUA 2+*   KETONESU Negative   BILIRUBINUA Negative   OCCULTUA 2+*   NITRITE Negative   UROBILINOGEN Negative   LEUKOCYTESUR Negative   RBCUA 6*   WBCUA 2   BACTERIA Moderate*   HYALINECASTS 38*       Diagnostic Results:  I have reviewed all pertinent imaging results/findings within the past 24 hours.     CT abdomen/Pelvis (07/14/18)    Large right subcapsular hematoma with likely mass-effect on the right kidney.  Additionally, there is scattered mesenteric edema and free fluid as well as a fluid/fluid level within the pelvis suggestive of layering hemorrhage.    Moderate right pleural effusion and bibasilar consolidation/atelectasis.    Assessment/Plan:     Antiphospholipid syndrome    - Patient admitted for retroperitoneal bleed; transferred to ICU for hemodynamic instability. Getting pRBC and platelet transfusions.   - 07/15/18, hematology service consulted for anemia, and thrombocytopenia in setting of acute blood loss and antiphospholipid syndrome   - Patient was seen and examined at the bedside. Patient continues to feel lethargic. She is hard of hearing on the right side. Endorses right flank pain and dyspnea at rest.   - Per patient, she was diagnosed with antiphospholipid syndrome 10 years ago as she was undergoing routine lab work prior to brain irradiation. About 2-3 years ago, she had a deep venous thrombosis in the right leg and was started on warfarin 5 mg daily. Her last warfarin dose was on 07/12/18, and she followed up at coumadin clinic on 07/11/18 with INR 2.6.      Recommendations:   1) Given patient has antiphospholipid syndrome, and is dyspneic, tachycardic with elevated D Dimer. Possible suspicion of pulmonary embolism. Consider VQ  Scan.  2) Consider additional imaging to evaluate for the acute blood loss and the source, and to evaluate interval change of the hematoma and recommend immediate intervention if expansion of the hematoma is noted on the radiologic imaging.         Thrombocytopenia    - Thrombocytopenia; platelets 46 from 62 in am (07/15/18). Platelets 162 on arrival.  - likely consumptive thrombocytopenia from acute blood loss  - transfuse platelets to keep the range > 50        Acute blood loss anemia    Anemia is likely due to acute blood loss; possible site is retroperitoneum  - In am the Hb 8.2; down to 7.5 (07/15). Upon arrival Hb 10.5. Continue serial CBC monitoring  - Continue pRBC transfusions for Hb < 7.0 or suspicion of active bleed. Patient is currently s/p transfusion of 7 units pRBC and 2 FFP's; and 1 platelets  - Patient received 2 units pRBC and 1 platelets ( 07/15/18)  - Given patient was hemodynamically unstable upon transfer to ICU and continues to be tachycardic, very concerning for active blood loss.   - At this point, Haptoglobin level is 199 ( > 10), less likely that patient is undergoing hemolysis.   - Highly suggest the primary team to evaluate for acute blood loss and interval change of retroperitoneal hematoma and intervene if necessary               Thank you for your consult. I will follow-up with patient. Please contact us if you have any additional questions.     Patient examined and discussed with the Hematology/Oncology Staff: Dr. Vinny MD PGY II  Hematology/Oncology  [facility]

## 2018-07-15 NOTE — HOSPITAL COURSE
07/14 - 61 yo F with PMHx antiphospholipid syndrome, RLE DVT (resolved), right acoustic neuroma, thyroid ca, brain aneurysm admitted from OSH with spontaneous perinephric hematoma as denies trauma or recent surgical intervention. INR 5 on admission, s/p 3u PRBCs total.    - Due to dropping blood pressures (SBP in mid 80s with DBP in mid 50s) admitted to ICU   -since admit to the ICU she has had adequate BP with SBP >120 and MAPs >65  Urology and IR consulted overnight and following.  07/15 - Multiple blood products transfused throughout the day. CTA was unable to appreciate any active bleeding.  07/16 - Bilateral LE Ultrasound negative for DVT. Discussed IVC Filter placement  07/17 - H/H Stable. No blood products given  07/18 - IVC Filter Placed without incident.

## 2018-07-15 NOTE — PLAN OF CARE
Problem: Patient Care Overview  Goal: Plan of Care Review  Outcome: Ongoing (interventions implemented as appropriate)  POC reviewed w pt and daughter. Pt AAOx4. Following commands. Sinus tach in 120s. Oxygenating well on 5L NC. BP WNL. Afebrile. Trending CBC, INR, PTT q4h. H&H and platelets trending down. Transfusing w 1 unit pRBCs. C/o right abdominal and back pain. Moderately  controlled w fentanyl 25 mcg q4h. Abdomen remains soft but tender. Nauseated throughout the night. Zofran and Compazine given. Chew in place. UOP stopped throughout the night. Had to flush chew twice. Received clear yellow urine back each time. 1 L LR given for low UOP. Remains NPO. All questions and concerns addressed. Will monitor closely.

## 2018-07-15 NOTE — ASSESSMENT & PLAN NOTE
- Perinephric hematoma: measuring [id number] with evidence of mass effect on right kidney as well as free fluid noted in pelvis on CT scan (unknown how this compares to outside CT as no records/CT scan were available to MICU team - Urology has previous CT in there possession and are following patient)     - Surgery consult given above findings including Intra-abdominal HTN -Bladder Pressure - 18    - Troponin I - 0.05 - Pt with resolved episode of chest tightness and shortness of breath  - F/U Repeat Troponin  - CXR on 7/14 and repeat on 7/15 - There are small bilateral pleural effusions.  There is bibasilar atelectasis (Pt on 6L nasal cannula sat'ing well, but requiring for symptomatic relief)    - Hx of antiphospholipid syndrome - Consult Heme/Onc for recs on plasmaphoresis as a potential treatment modality.     - INR 5.0 on admission and on repeat 4 hours later was 4.4; given vitamin K in ED  - INR corrected (currently 1.2) (7/15)    - Received 3U of blood total since transfer; received FFP and bolus of 1L saline x2    - Urology and IR consulted - Do not feel that there is an intervention needed at this time  - Plans for potential CTA (load with hydrocrotisone 200 and IV benadryl 50; patient denies anaphylaxis reaction) however, patients MAURO precluded us from getting the study.      - Hg trend from 11 on admission to a current 7.5 despite 3 units of PRBC's here at [facility] and 2 prior to admission  -CBC and INR has been monitored q4h.  - Platelets trend from 259 on admission to 46. Pt now receiving platelets.

## 2018-07-15 NOTE — HPI
Mrs. Persaud is a 62 years old female with antiphospholipid syndrome being treated with warfarin 5 mg daily for 2 years, and history of thyroid cancer, brain aneurysm transferred from [facility] ED in [place], [place] with concerns of a perinephric hematoma. Patient symptoms initially began on 07/12/18 at 11 pm, when she started to have right sided flank pain radiating to the mid abdomen and progressively became worse to seek medical attention. Imaging consistent with perinephric hematoma with mesenteric edema.   Outside labs show INR of 2.6.  OSH labs wre WBC/10.9, H/H 13.1/37.6  One unit of blood was given in route to [facility].     On 07/13/18, patient arrived at [facility]. Patient completed 2 blood transfusions in [facility].  At that time, patient was admitted to hospital medicine.  Hg was 10.7 s/p 2 U.  INR noted to be 5.0. Urology and IR consulted for retroperitoneal bleed.  They stated that there was intervention needed at this time.  In the evening of 7/13, rapid response was activated due to patient feeling lethargic, weak, and diaphoretic. She was found to be lethargic but arousable.  Oriented X4 with no motor deficits noted.  BS performed revealed a BS of 258.   BP 94/65 RR 12 O2 94% on RA.  Placed on 2L NC. 3 units pRBC, and 2 FFP along with saline bolus administered and patient was transferred to ICU service.    07/15/18, hematology service consulted for anemia, and thrombocytopenia in setting of acute blood loss and antiphospholipid syndrome Patient was seen and examined at the bedside. Patient continues to feel lethargic. She is hard of hearing on the right side. Endorses right flank pain and dyspnea at rest. Per patient, she was diagnosed with antiphospholipid syndrome 10 years ago as she was undergoing routine lab work prior to brain irradiation. About 2-3 years ago, she had a deep venous thrombosis in the right leg and was started on warfarin 5 mg daily. Her last warfarin dose was on  07/12/18, and she followed up at coumadin clinic on 07/11/18 with INR 2.6.

## 2018-07-15 NOTE — ASSESSMENT & PLAN NOTE
- Thrombocytopenia; platelets 46 from 62 in am (07/15/18). Platelets 162 on arrival.  - likely consumptive thrombocytopenia from acute blood loss  - transfuse platelets to keep the range > 50

## 2018-07-15 NOTE — ASSESSMENT & PLAN NOTE
- Patient is hemodynamically stable s/p 5 units PRBCs, 2FFP, continue to trend CBCs  - continue to trend CBCs q 4 hours  - Continue Critical care admission  - maintain INR at normal level   - recommend platelet transfusion  - continue PBRC and FFP transfusions as needed   - continue bed rest   - Recommend removing chew catheter  - Recommend stopping IV antibiotics, suspicion for infection is low  - no surgical intervention at this time   - recommend incentive spirometry   - no further imaging needed at this time   - okay for full liquids today   Do not send to interventional radiology for intervention without discussing with urology  - please call with questions or concerns.

## 2018-07-15 NOTE — NURSING
MD notified of 0-5cc UOP the past three hours. 1 L LR bolus given. RN also flushed chew w 10cc NS. Received 300cc clear yellow urine back. Will monitor output closely.

## 2018-07-15 NOTE — NURSING
UOP 5cc this hour. RN flushed chew with 10cc NS. Received 115 cc clear yellow urine back. Will continue to monitor output. MD aware.

## 2018-07-15 NOTE — SUBJECTIVE & OBJECTIVE
Interval History/Significant Events: Labs consistent with active bleeding. Pt being transfused appropriately. Will continue to monitor and treat appropriately. IR, Urology, and Heme/On following pt. Follow up on recs.     Review of Systems   Constitutional: Negative for chills and diaphoresis.   HENT: Negative for congestion, nosebleeds and sore throat.    Eyes: Negative for discharge and visual disturbance.   Respiratory: Positive for chest tightness and shortness of breath. Negative for wheezing.    Cardiovascular: Negative for chest pain and leg swelling.   Gastrointestinal: Positive for abdominal distention and abdominal pain. Negative for blood in stool and nausea.   Endocrine: Negative for cold intolerance and heat intolerance.   Genitourinary: Positive for flank pain. Negative for hematuria and urgency.   Musculoskeletal: Negative for myalgias and neck stiffness.   Skin: Negative for color change and wound.   Neurological: Negative for dizziness and facial asymmetry.   Hematological: Negative for adenopathy. Does not bruise/bleed easily.   Psychiatric/Behavioral: Negative for decreased concentration and suicidal ideas.     Objective:     Vital Signs (Most Recent):  Temp: 98.6 °F (37 °C) (07/15/18 1230)  Pulse: (!) 114 (07/15/18 1245)  Resp: (!) 23 (07/15/18 1245)  BP: (!) 145/66 (07/15/18 1200)  SpO2: 99 % (07/15/18 1245) Vital Signs (24h Range):  Temp:  [98.6 °F (37 °C)-98.9 °F (37.2 °C)] 98.6 °F (37 °C)  Pulse:  [104-128] 114  Resp:  [22-36] 23  SpO2:  [93 %-100 %] 99 %  BP: (112-174)/(59-82) 145/66   Weight: 104 kg (229 lb 4.5 oz)  Body mass index is 34.86 kg/m².      Intake/Output Summary (Last 24 hours) at 07/15/18 1302  Last data filed at 07/15/18 1230   Gross per 24 hour   Intake             1992 ml   Output             1140 ml   Net              852 ml       Physical Exam   Constitutional: She is oriented to person, place, and time. She appears well-developed and well-nourished.   HENT:   Head:  Normocephalic and atraumatic.   Eyes: Pupils are equal, round, and reactive to light.   Cardiovascular: Normal heart sounds.  Exam reveals no gallop and no friction rub.    No murmur heard.  Pulmonary/Chest: Breath sounds normal. No respiratory distress. She has no wheezes.   Abdominal: Soft. She exhibits distension. There is tenderness.   RUQ and RLQ tenderness   Musculoskeletal: Normal range of motion.   Neurological: She is alert and oriented to person, place, and time.   Skin: Skin is warm and dry.   Nursing note and vitals reviewed.      Vents:  Oxygen Concentration (%): 28 (07/13/18 2329)  Lines/Drains/Airways     Drain                 Urethral Catheter 07/13/18 2230 Latex 1 day          Peripheral Intravenous Line                 Peripheral IV - Single Lumen 07/13/18 1353 Right Forearm 1 day         Peripheral IV - Single Lumen 07/13/18 1831 Right Hand 1 day         Peripheral IV - Single Lumen 07/13/18 1902 Right Antecubital 1 day              Significant Labs:    CBC/Anemia Profile:    Recent Labs  Lab 07/15/18  0414 07/15/18  0737 07/15/18  1137   WBC 15.97* 17.22* 14.31*   HGB 7.1* 8.2* 7.5*   HCT 21.4* 25.6* 22.6*   PLT 68* 62* 46*   MCV 89 93 90   RDW 13.9 13.8 13.8        Chemistries:    Recent Labs  Lab 07/14/18  0257 07/14/18  1912 07/15/18  0414 07/15/18  0737    142 141 140   K 4.6 4.8 4.3 4.5   * 112* 111* 113*   CO2 16* 20* 19* 19*   BUN 28* 37* 35* 33*   CREATININE 1.6* 1.8* 1.6* 1.5*   CALCIUM 8.4* 8.7 8.3* 8.3*   ALBUMIN 3.0* 2.9* 2.6* 2.6*   PROT 6.2 6.0 5.5* 5.6*   BILITOT 1.9* 1.8* 2.0* 2.0*   ALKPHOS 82 77 78 78   * 203* 186* 185*   * 263* 215* 204*   MG 2.2 2.0 1.8  --    PHOS 4.0 4.3 3.4  --        Recent Lab Results       07/15/18  1200 07/15/18  1138 07/15/18  1137 07/15/18  0825 07/15/18  0737      Immature Granulocytes   1.1(H)  1.4(H)     Immature Grans (Abs)   0.16  Comment:  Mild elevation in immature granulocytes is non specific and   can be seen in a  variety of conditions including stress response,   acute inflammation, trauma and pregnancy. Correlation with other   laboratory and clinical findings is essential.  (H)  0.24  Comment:  Mild elevation in immature granulocytes is non specific and   can be seen in a variety of conditions including stress response,   acute inflammation, trauma and pregnancy. Correlation with other   laboratory and clinical findings is essential.  (H)     Albumin     2.6(L)     Alkaline Phosphatase     78     ALT     185(H)     Anion Gap     8     aPTT  34.9  Comment:  aPTT therapeutic range = 39-69 seconds(H)   34.0  Comment:  aPTT therapeutic range = 39-69 seconds(H)     AST     204(H)     Baso #   0.01  0.02     Basophil%   0.1  0.1     Total Bilirubin     2.0  Comment:  For infants and newborns, interpretation of results should be based  on gestational age, weight and in agreement with clinical  observations.  Premature Infant recommended reference ranges:  Up to 24 hours.............<8.0 mg/dL  Up to 48 hours............<12.0 mg/dL  3-5 days..................<15.0 mg/dL  6-29 days.................<15.0 mg/dL  (H)     BUN, Bld     33(H)     Calcium     8.3(L)     Chloride     113(H)     CO2     19(L)     Creatinine     1.5(H)     D-Dimer  1.81  Comment:  The quantitative D-dimer assay should be used as an aid in   the diagnosis of deep vein thrombosis and pulmonary embolism  in patients with the appropriate presentation and clinical  history. The upper limit of the reference interval and the clinical   cut off   point are identical. Causes of a positive (>0.50 mg/L FEU) D-Dimer   test  include, but are not limited to: DVT, PE, DIC, thrombolytic   therapy, anticoagulant therapy, recent surgery, trauma, or   pregnancy, disseminated malignancy, aortic aneurysm, cirrhosis,  and severe infection. False negative results may occur in   patients with distal DVT.  (H)        Differential Method   Automated  Automated     eGFR if   American     43.0(A)     eGFR if non      37.3  Comment:  Calculation used to obtain the estimated glomerular filtration  rate (eGFR) is the CKD-EPI equation.   (A)     Eos #   0.0  0.0     Eosinophil%   0.0  0.0     Fibrinogen  518(H)        Glucose     123(H)     Gran # (ANC)   11.3(H)  14.1(H)     Gran%   79.1(H)  81.7(H)     Haptoglobin 199         Hematocrit   22.6(L)  25.6(L)     Hemoglobin   7.5(L)  8.2(L)     Coumadin Monitoring INR  1.2  Comment:  Coumadin Therapy:  2.0 - 3.0 for INR for all indicators except mechanical heart valves  and antiphospholipid syndromes which should use 2.5 - 3.5.     1.2  Comment:  Coumadin Therapy:  2.0 - 3.0 for INR for all indicators except mechanical heart valves  and antiphospholipid syndromes which should use 2.5 - 3.5.       Lymph #   1.2  1.2     Lymph%   8.2(L)  6.8(L)     Magnesium          MCH   29.9  29.7     MCHC   33.2  32.0     MCV   90  93     Mono #   1.6(H)  1.7(H)     Mono%   11.5  10.0     MPV   10.5  10.5     nRBC   0  0     Phosphorus          Platelets   46(L)  62(L)     Potassium     4.5     Total Protein     5.6(L)     Protime  11.8   11.9     RBC   2.51(L)  2.76(L)     RDW   13.8  13.8     Sodium     140     Troponin I    0.058  Comment:  The reference interval for Troponin I represents the 99th percentile   cutoff   for our facility and is consistent with 3rd generation assay   performance.  (H)      Vancomycin, Random          WBC   14.31(H)  17.22(H)                 07/15/18  0414 07/15/18  0218 07/15/18  0052 07/14/18  1912 07/14/18  1541      Immature Granulocytes 1.6(H) 1.6(H) 1.9(H) 0.9(H) 0.8(H)     Immature Grans (Abs) 0.26  Comment:  Mild elevation in immature granulocytes is non specific and   can be seen in a variety of conditions including stress response,   acute inflammation, trauma and pregnancy. Correlation with other   laboratory and clinical findings is essential.  (H) 0.28  Comment:  Mild elevation in immature  granulocytes is non specific and   can be seen in a variety of conditions including stress response,   acute inflammation, trauma and pregnancy. Correlation with other   laboratory and clinical findings is essential.  (H) 0.30  Comment:  Mild elevation in immature granulocytes is non specific and   can be seen in a variety of conditions including stress response,   acute inflammation, trauma and pregnancy. Correlation with other   laboratory and clinical findings is essential.  (H) 0.20  Comment:  Mild elevation in immature granulocytes is non specific and   can be seen in a variety of conditions including stress response,   acute inflammation, trauma and pregnancy. Correlation with other   laboratory and clinical findings is essential.  (H) 0.19  Comment:  Mild elevation in immature granulocytes is non specific and   can be seen in a variety of conditions including stress response,   acute inflammation, trauma and pregnancy. Correlation with other   laboratory and clinical findings is essential.  (H)     Albumin 2.6(L)   2.9(L)      Alkaline Phosphatase 78   77      (H)   203(H)      Anion Gap 11   10      aPTT 32.0  Comment:  aPTT therapeutic range = 39-69 seconds  30.1  Comment:  aPTT therapeutic range = 39-69 seconds 38.5  Comment:  aPTT therapeutic range = 39-69 seconds(H) 40.5  Comment:  aPTT therapeutic range = 39-69 seconds(H)     (H)   263(H)      Baso # 0.02 0.02 0.02 0.02 0.02     Basophil% 0.1 0.1 0.1 0.1 0.1     Total Bilirubin 2.0  Comment:  For infants and newborns, interpretation of results should be based  on gestational age, weight and in agreement with clinical  observations.  Premature Infant recommended reference ranges:  Up to 24 hours.............<8.0 mg/dL  Up to 48 hours............<12.0 mg/dL  3-5 days..................<15.0 mg/dL  6-29 days.................<15.0 mg/dL  (H)   1.8  Comment:  For infants and newborns, interpretation of results should be based  on gestational  age, weight and in agreement with clinical  observations.  Premature Infant recommended reference ranges:  Up to 24 hours.............<8.0 mg/dL  Up to 48 hours............<12.0 mg/dL  3-5 days..................<15.0 mg/dL  6-29 days.................<15.0 mg/dL  (H)      BUN, Bld 35(H)   37(H)      Calcium 8.3(L)   8.7      Chloride 111(H)   112(H)      CO2 19(L)   20(L)      Creatinine 1.6(H)   1.8(H)      D-Dimer          Differential Method Automated Automated Automated Automated Automated     eGFR if  39.8(A)   34.5(A)      eGFR if non  34.5  Comment:  Calculation used to obtain the estimated glomerular filtration  rate (eGFR) is the CKD-EPI equation.   (A)   29.9  Comment:  Calculation used to obtain the estimated glomerular filtration  rate (eGFR) is the CKD-EPI equation.   (A)      Eos # 0.0 0.0 0.0 0.0 0.0     Eosinophil% 0.0 0.0 0.1 0.0 0.0     Fibrinogen          Glucose 125(H)   125(H)      Gran # (ANC) 12.8(H) 14.8(H) 12.6(H) 17.1(H) 17.8(H)     Gran% 80.5(H) 81.9(H) 81.2(H) 78.2(H) 79.3(H)     Haptoglobin          Hematocrit 21.4(L) 21.6(L) 19.3  Comment:  HCT   critical result(s) called and verbal readback obtained from   Elvia Alejandre RN, 07/15/2018 01:46  (LL) 24.9(L) 26.2(L)     Hemoglobin 7.1(L) 7.4(L) 6.6(L) 8.8(L) 8.8(L)     Coumadin Monitoring INR 1.2  Comment:  Coumadin Therapy:  2.0 - 3.0 for INR for all indicators except mechanical heart valves  and antiphospholipid syndromes which should use 2.5 - 3.5.    1.2  Comment:  Coumadin Therapy:  2.0 - 3.0 for INR for all indicators except mechanical heart valves  and antiphospholipid syndromes which should use 2.5 - 3.5.   1.3  Comment:  Coumadin Therapy:  2.0 - 3.0 for INR for all indicators except mechanical heart valves  and antiphospholipid syndromes which should use 2.5 - 3.5.  (H) 1.3  Comment:  Coumadin Therapy:  2.0 - 3.0 for INR for all indicators except mechanical heart valves  and antiphospholipid  syndromes which should use 2.5 - 3.5.  (H)     Lymph # 1.0 1.1 0.9(L) 1.7 1.7     Lymph% 6.5(L) 6.2(L) 6.0(L) 7.7(L) 7.4(L)     Magnesium 1.8   2.0      MCH 29.5 30.5 30.4 30.9 29.7     MCHC 33.2 34.3 34.2 35.3 33.6     MCV 89 89 89 87 89     Mono # 1.8(H) 1.8(H) 1.7(H) 2.9(H) 2.8(H)     Mono% 11.3 10.2 10.7 13.1 12.4     MPV 10.9 11.0 11.6 11.2 11.4     nRBC 0 0 0 0 0     Phosphorus 3.4   4.3      Platelets 68(L) 81(L) 84(L) 157 158     Potassium 4.3   4.8      Total Protein 5.5(L)   6.0      Protime 11.9  12.2 12.8(H) 12.7(H)     RBC 2.41(L) 2.43(L) 2.17(L) 2.85(L) 2.96(L)     RDW 13.9 13.9 14.0 13.9 14.0     Sodium 141   142      Troponin I          Vancomycin, Random 28.7         WBC 15.97(H) 18.01(H) 15.56(H) 21.84(H) 22.42(H)                     Significant Imaging:  I have reviewed all pertinent imaging results/findings within the past 24 hours.

## 2018-07-16 LAB
ABO + RH BLD: NORMAL
ALBUMIN SERPL BCP-MCNC: 2.3 G/DL
ALBUMIN SERPL BCP-MCNC: 2.4 G/DL
ALBUMIN SERPL BCP-MCNC: 2.5 G/DL
ALP SERPL-CCNC: 85 U/L
ALP SERPL-CCNC: 86 U/L
ALP SERPL-CCNC: 88 U/L
ALT SERPL W/O P-5'-P-CCNC: 115 U/L
ALT SERPL W/O P-5'-P-CCNC: 126 U/L
ALT SERPL W/O P-5'-P-CCNC: 142 U/L
ANION GAP SERPL CALC-SCNC: 7 MMOL/L
ANION GAP SERPL CALC-SCNC: 9 MMOL/L
ANION GAP SERPL CALC-SCNC: 9 MMOL/L
APTT BLDCRRT: 35.7 SEC
APTT BLDCRRT: 38.4 SEC
AST SERPL-CCNC: 102 U/L
AST SERPL-CCNC: 133 U/L
AST SERPL-CCNC: 80 U/L
BASOPHILS # BLD AUTO: 0 K/UL
BASOPHILS # BLD AUTO: 0.01 K/UL
BASOPHILS # BLD AUTO: 0.01 K/UL
BASOPHILS NFR BLD: 0 %
BASOPHILS NFR BLD: 0.1 %
BASOPHILS NFR BLD: 0.1 %
BILIRUB SERPL-MCNC: 2.2 MG/DL
BILIRUB SERPL-MCNC: 2.2 MG/DL
BILIRUB SERPL-MCNC: 2.3 MG/DL
BLD GP AB SCN CELLS X3 SERPL QL: NORMAL
BLD PROD TYP BPU: NORMAL
BLOOD UNIT EXPIRATION DATE: NORMAL
BLOOD UNIT TYPE CODE: 5100
BLOOD UNIT TYPE CODE: 6200
BLOOD UNIT TYPE CODE: 9500
BLOOD UNIT TYPE: NORMAL
BUN SERPL-MCNC: 25 MG/DL
BUN SERPL-MCNC: 25 MG/DL
BUN SERPL-MCNC: 26 MG/DL
CALCIUM SERPL-MCNC: 7.6 MG/DL
CALCIUM SERPL-MCNC: 8 MG/DL
CALCIUM SERPL-MCNC: 8.3 MG/DL
CHLORIDE SERPL-SCNC: 112 MMOL/L
CHLORIDE SERPL-SCNC: 113 MMOL/L
CHLORIDE SERPL-SCNC: 116 MMOL/L
CO2 SERPL-SCNC: 19 MMOL/L
CO2 SERPL-SCNC: 20 MMOL/L
CO2 SERPL-SCNC: 20 MMOL/L
CODING SYSTEM: NORMAL
CREAT SERPL-MCNC: 1.1 MG/DL
CREAT SERPL-MCNC: 1.1 MG/DL
CREAT SERPL-MCNC: 1.2 MG/DL
DIFFERENTIAL METHOD: ABNORMAL
DISPENSE STATUS: NORMAL
EOSINOPHIL # BLD AUTO: 0 K/UL
EOSINOPHIL NFR BLD: 0 %
EOSINOPHIL NFR BLD: 0.3 %
ERYTHROCYTE [DISTWIDTH] IN BLOOD BY AUTOMATED COUNT: 14.2 %
ERYTHROCYTE [DISTWIDTH] IN BLOOD BY AUTOMATED COUNT: 14.2 %
ERYTHROCYTE [DISTWIDTH] IN BLOOD BY AUTOMATED COUNT: 14.3 %
ERYTHROCYTE [DISTWIDTH] IN BLOOD BY AUTOMATED COUNT: 14.3 %
ERYTHROCYTE [DISTWIDTH] IN BLOOD BY AUTOMATED COUNT: 14.5 %
ERYTHROCYTE [DISTWIDTH] IN BLOOD BY AUTOMATED COUNT: 14.5 %
EST. GFR  (AFRICAN AMERICAN): 56.4 ML/MIN/1.73 M^2
EST. GFR  (AFRICAN AMERICAN): >60 ML/MIN/1.73 M^2
EST. GFR  (AFRICAN AMERICAN): >60 ML/MIN/1.73 M^2
EST. GFR  (NON AFRICAN AMERICAN): 48.9 ML/MIN/1.73 M^2
EST. GFR  (NON AFRICAN AMERICAN): 54.3 ML/MIN/1.73 M^2
EST. GFR  (NON AFRICAN AMERICAN): 54.3 ML/MIN/1.73 M^2
GLUCOSE SERPL-MCNC: 117 MG/DL
GLUCOSE SERPL-MCNC: 122 MG/DL
GLUCOSE SERPL-MCNC: 87 MG/DL
HCT VFR BLD AUTO: 21.4 %
HCT VFR BLD AUTO: 22 %
HCT VFR BLD AUTO: 23 %
HCT VFR BLD AUTO: 23.4 %
HCT VFR BLD AUTO: 23.6 %
HCT VFR BLD AUTO: 23.6 %
HGB BLD-MCNC: 7.1 G/DL
HGB BLD-MCNC: 7.3 G/DL
HGB BLD-MCNC: 7.3 G/DL
HGB BLD-MCNC: 7.7 G/DL
HGB BLD-MCNC: 7.8 G/DL
HGB BLD-MCNC: 7.9 G/DL
IMM GRANULOCYTES # BLD AUTO: 0.06 K/UL
IMM GRANULOCYTES # BLD AUTO: 0.07 K/UL
IMM GRANULOCYTES # BLD AUTO: 0.08 K/UL
IMM GRANULOCYTES # BLD AUTO: 0.1 K/UL
IMM GRANULOCYTES # BLD AUTO: 0.1 K/UL
IMM GRANULOCYTES # BLD AUTO: 0.13 K/UL
IMM GRANULOCYTES NFR BLD AUTO: 0.7 %
IMM GRANULOCYTES NFR BLD AUTO: 0.9 %
IMM GRANULOCYTES NFR BLD AUTO: 1 %
IMM GRANULOCYTES NFR BLD AUTO: 1.1 %
IMM GRANULOCYTES NFR BLD AUTO: 1.1 %
IMM GRANULOCYTES NFR BLD AUTO: 1.3 %
INR PPP: 1.1
INR PPP: 1.1
LYMPHOCYTES # BLD AUTO: 0.6 K/UL
LYMPHOCYTES # BLD AUTO: 0.6 K/UL
LYMPHOCYTES # BLD AUTO: 0.7 K/UL
LYMPHOCYTES # BLD AUTO: 0.7 K/UL
LYMPHOCYTES # BLD AUTO: 1 K/UL
LYMPHOCYTES # BLD AUTO: 1.1 K/UL
LYMPHOCYTES NFR BLD: 12.5 %
LYMPHOCYTES NFR BLD: 13.4 %
LYMPHOCYTES NFR BLD: 6.3 %
LYMPHOCYTES NFR BLD: 6.4 %
LYMPHOCYTES NFR BLD: 7 %
LYMPHOCYTES NFR BLD: 7.3 %
MAGNESIUM SERPL-MCNC: 3.1 MG/DL
MCH RBC QN AUTO: 30.2 PG
MCH RBC QN AUTO: 30.3 PG
MCH RBC QN AUTO: 30.3 PG
MCH RBC QN AUTO: 30.7 PG
MCH RBC QN AUTO: 30.7 PG
MCH RBC QN AUTO: 31 PG
MCHC RBC AUTO-ENTMCNC: 31.7 G/DL
MCHC RBC AUTO-ENTMCNC: 32.6 G/DL
MCHC RBC AUTO-ENTMCNC: 33.1 G/DL
MCHC RBC AUTO-ENTMCNC: 33.2 G/DL
MCHC RBC AUTO-ENTMCNC: 33.2 G/DL
MCHC RBC AUTO-ENTMCNC: 33.8 G/DL
MCV RBC AUTO: 91 FL
MCV RBC AUTO: 92 FL
MCV RBC AUTO: 92 FL
MCV RBC AUTO: 93 FL
MCV RBC AUTO: 93 FL
MCV RBC AUTO: 95 FL
MONOCYTES # BLD AUTO: 0.8 K/UL
MONOCYTES # BLD AUTO: 0.8 K/UL
MONOCYTES # BLD AUTO: 0.9 K/UL
MONOCYTES # BLD AUTO: 0.9 K/UL
MONOCYTES # BLD AUTO: 1 K/UL
MONOCYTES # BLD AUTO: 1 K/UL
MONOCYTES NFR BLD: 10.6 %
MONOCYTES NFR BLD: 11.5 %
MONOCYTES NFR BLD: 12.4 %
MONOCYTES NFR BLD: 8.1 %
MONOCYTES NFR BLD: 9.3 %
MONOCYTES NFR BLD: 9.6 %
NEUTROPHILS # BLD AUTO: 5.8 K/UL
NEUTROPHILS # BLD AUTO: 6.3 K/UL
NEUTROPHILS # BLD AUTO: 6.8 K/UL
NEUTROPHILS # BLD AUTO: 7.3 K/UL
NEUTROPHILS # BLD AUTO: 7.3 K/UL
NEUTROPHILS # BLD AUTO: 8.6 K/UL
NEUTROPHILS NFR BLD: 72.9 %
NEUTROPHILS NFR BLD: 75.2 %
NEUTROPHILS NFR BLD: 81.4 %
NEUTROPHILS NFR BLD: 82 %
NEUTROPHILS NFR BLD: 83.3 %
NEUTROPHILS NFR BLD: 84.2 %
NRBC BLD-RTO: 0 /100 WBC
NUM UNITS TRANS FFP: NORMAL
NUM UNITS TRANS FFP: NORMAL
NUM UNITS TRANS PACKED RBC: NORMAL
PATH REV BLD -IMP: NORMAL
PATH REV BLD -IMP: NORMAL
PHOSPHATE SERPL-MCNC: 3.5 MG/DL
PLATELET # BLD AUTO: 42 K/UL
PLATELET # BLD AUTO: 45 K/UL
PLATELET # BLD AUTO: 45 K/UL
PLATELET # BLD AUTO: 46 K/UL
PLATELET # BLD AUTO: 52 K/UL
PLATELET # BLD AUTO: 66 K/UL
PMV BLD AUTO: 10 FL
PMV BLD AUTO: 10.1 FL
PMV BLD AUTO: 10.2 FL
PMV BLD AUTO: 10.3 FL
PMV BLD AUTO: 10.3 FL
PMV BLD AUTO: 10.4 FL
POTASSIUM SERPL-SCNC: 3.3 MMOL/L
POTASSIUM SERPL-SCNC: 4.1 MMOL/L
POTASSIUM SERPL-SCNC: 5.3 MMOL/L
PROT SERPL-MCNC: 5.5 G/DL
PROT SERPL-MCNC: 5.8 G/DL
PROT SERPL-MCNC: 6.1 G/DL
PROTHROMBIN TIME: 11.4 SEC
PROTHROMBIN TIME: 11.4 SEC
RBC # BLD AUTO: 2.35 M/UL
RBC # BLD AUTO: 2.38 M/UL
RBC # BLD AUTO: 2.41 M/UL
RBC # BLD AUTO: 2.54 M/UL
RBC # BLD AUTO: 2.54 M/UL
RBC # BLD AUTO: 2.55 M/UL
SODIUM SERPL-SCNC: 140 MMOL/L
SODIUM SERPL-SCNC: 141 MMOL/L
SODIUM SERPL-SCNC: 144 MMOL/L
TRANS PLATPHERESIS VOL PATIENT: NORMAL ML
TRANS PLATPHERESIS VOL PATIENT: NORMAL ML
WBC # BLD AUTO: 10.19 K/UL
WBC # BLD AUTO: 7.92 K/UL
WBC # BLD AUTO: 8.32 K/UL
WBC # BLD AUTO: 8.35 K/UL
WBC # BLD AUTO: 8.74 K/UL
WBC # BLD AUTO: 8.85 K/UL

## 2018-07-16 PROCEDURE — 85025 COMPLETE CBC W/AUTO DIFF WBC: CPT | Mod: 91

## 2018-07-16 PROCEDURE — 94761 N-INVAS EAR/PLS OXIMETRY MLT: CPT

## 2018-07-16 PROCEDURE — 97161 PT EVAL LOW COMPLEX 20 MIN: CPT

## 2018-07-16 PROCEDURE — 80053 COMPREHEN METABOLIC PANEL: CPT | Mod: 91

## 2018-07-16 PROCEDURE — P9035 PLATELET PHERES LEUKOREDUCED: HCPCS

## 2018-07-16 PROCEDURE — 84100 ASSAY OF PHOSPHORUS: CPT

## 2018-07-16 PROCEDURE — 63600175 PHARM REV CODE 636 W HCPCS: Performed by: NURSE PRACTITIONER

## 2018-07-16 PROCEDURE — 86850 RBC ANTIBODY SCREEN: CPT

## 2018-07-16 PROCEDURE — 85610 PROTHROMBIN TIME: CPT

## 2018-07-16 PROCEDURE — C9113 INJ PANTOPRAZOLE SODIUM, VIA: HCPCS | Performed by: HOSPITALIST

## 2018-07-16 PROCEDURE — 25000003 PHARM REV CODE 250: Performed by: STUDENT IN AN ORGANIZED HEALTH CARE EDUCATION/TRAINING PROGRAM

## 2018-07-16 PROCEDURE — 99900035 HC TECH TIME PER 15 MIN (STAT)

## 2018-07-16 PROCEDURE — 85730 THROMBOPLASTIN TIME PARTIAL: CPT

## 2018-07-16 PROCEDURE — 25000003 PHARM REV CODE 250: Performed by: HOSPITALIST

## 2018-07-16 PROCEDURE — 36430 TRANSFUSION BLD/BLD COMPNT: CPT

## 2018-07-16 PROCEDURE — 83735 ASSAY OF MAGNESIUM: CPT

## 2018-07-16 PROCEDURE — 20000000 HC ICU ROOM

## 2018-07-16 PROCEDURE — 63600175 PHARM REV CODE 636 W HCPCS: Performed by: STUDENT IN AN ORGANIZED HEALTH CARE EDUCATION/TRAINING PROGRAM

## 2018-07-16 PROCEDURE — 80053 COMPREHEN METABOLIC PANEL: CPT

## 2018-07-16 PROCEDURE — 97535 SELF CARE MNGMENT TRAINING: CPT

## 2018-07-16 PROCEDURE — 63600175 PHARM REV CODE 636 W HCPCS: Performed by: HOSPITALIST

## 2018-07-16 PROCEDURE — 99233 SBSQ HOSP IP/OBS HIGH 50: CPT | Mod: ,,, | Performed by: INTERNAL MEDICINE

## 2018-07-16 PROCEDURE — P9016 RBC LEUKOCYTES REDUCED: HCPCS

## 2018-07-16 PROCEDURE — 27000221 HC OXYGEN, UP TO 24 HOURS

## 2018-07-16 PROCEDURE — 25000003 PHARM REV CODE 250: Performed by: NURSE PRACTITIONER

## 2018-07-16 RX ORDER — HYDROCODONE BITARTRATE AND ACETAMINOPHEN 500; 5 MG/1; MG/1
TABLET ORAL
Status: DISCONTINUED | OUTPATIENT
Start: 2018-07-16 | End: 2018-07-16

## 2018-07-16 RX ORDER — PANTOPRAZOLE SODIUM 40 MG/1
40 TABLET, DELAYED RELEASE ORAL DAILY
Status: DISCONTINUED | OUTPATIENT
Start: 2018-07-17 | End: 2018-07-21 | Stop reason: HOSPADM

## 2018-07-16 RX ORDER — HYDROCODONE BITARTRATE AND ACETAMINOPHEN 500; 5 MG/1; MG/1
TABLET ORAL
Status: DISCONTINUED | OUTPATIENT
Start: 2018-07-16 | End: 2018-07-17

## 2018-07-16 RX ORDER — POTASSIUM CHLORIDE 20 MEQ/1
40 TABLET, EXTENDED RELEASE ORAL ONCE
Status: COMPLETED | OUTPATIENT
Start: 2018-07-16 | End: 2018-07-16

## 2018-07-16 RX ADMIN — DEXTROSE 40 MG: 50 INJECTION, SOLUTION INTRAVENOUS at 08:07

## 2018-07-16 RX ADMIN — ACETAMINOPHEN 650 MG: 325 TABLET, FILM COATED ORAL at 06:07

## 2018-07-16 RX ADMIN — ACETAMINOPHEN 650 MG: 325 TABLET, FILM COATED ORAL at 10:07

## 2018-07-16 RX ADMIN — POTASSIUM CHLORIDE 40 MEQ: 1500 TABLET, EXTENDED RELEASE ORAL at 05:07

## 2018-07-16 RX ADMIN — PIPERACILLIN AND TAZOBACTAM 4.5 G: 4; .5 INJECTION, POWDER, LYOPHILIZED, FOR SOLUTION INTRAVENOUS; PARENTERAL at 11:07

## 2018-07-16 RX ADMIN — HYDROCORTISONE SODIUM SUCCINATE 200 MG: 100 INJECTION, POWDER, FOR SOLUTION INTRAMUSCULAR; INTRAVENOUS at 03:07

## 2018-07-16 RX ADMIN — PIPERACILLIN AND TAZOBACTAM 4.5 G: 4; .5 INJECTION, POWDER, LYOPHILIZED, FOR SOLUTION INTRAVENOUS; PARENTERAL at 04:07

## 2018-07-16 RX ADMIN — RAMELTEON 8 MG: 8 TABLET, FILM COATED ORAL at 10:07

## 2018-07-16 NOTE — PROGRESS NOTES
[facility]  Urology  Progress Note    Patient Name: Emiliana Persaud  MRN: [id number]  Admission Date: 7/13/2018  Hospital Length of Stay: 3 days  Code Status: Full Code   Attending Provider: Ayan Figueroa MD   Primary Care Physician: [facility]    Subjective:     HPI:  61yo F with history of antiphospholipid syndrome on warfarin who was brought to the ED as a transfer from [facility] for perinephric hematoma. Per her family, she woke in the middle of the night with severe right flank and abdominal pain. In the ED she was found to have a perinephric hematoma and an INR of 2.5. She was transferred to [facility] ED for further evaluation. When she arrived to [facility] ED she had a Hgb of 11 and an INR of 5.0. Her vital signs were stable. She was currently receiving a 2nd unit of PRBCs in the ED.     She has no urologic surgical history. She has had a partial thyroidectomy, cholecystectomy with incisional hernia (s/p mesh repair) and radiation for an auditory nerve tumor. Her main complaints are fatigue and abdominal and flank pain.     Interval History:     Underwent CTA yesterday evening showing no active bleeding  Hemoglobin slowly drifting but overall stable  Receiving platelets this AM  Clinically appears improved  Patients pain improved      Review of Systems    Objective:     Temp:  [98 °F (36.7 °C)-99.3 °F (37.4 °C)] 98 °F (36.7 °C)  Pulse:  [] 86  Resp:  [18-36] 18  SpO2:  [94 %-100 %] 97 %  BP: (111-147)/(59-82) 120/62     Body mass index is 34.86 kg/m².       Bladder Scan Volume (mL): 200 mL (07/13/18 2115)    Drains     Drain                 Urethral Catheter 07/13/18 2230 Latex 1 day                Physical Exam   Constitutional: No distress.   HENT:   Head: Normocephalic and atraumatic.   Eyes: Conjunctivae are normal. No scleral icterus.   Neck: Normal range of motion.   Cardiovascular: Normal rate.    Pulmonary/Chest: Effort normal. No respiratory distress.    Abdominal: Soft. She exhibits no distension. There is tenderness. There is no rebound and no guarding.   Mild right upper and lower quadrant tenderness  No left sided tenderness  Mild CVA tenderness  Abdomen soft  Cholecystectomy and hernia repair scars well healed  No bruising on flank   Musculoskeletal: Normal range of motion. She exhibits edema (trace BLE edma).   Neurological: She is alert.   Skin: Skin is warm and dry. She is not diaphoretic.     Psychiatric: She has a normal mood and affect.       Significant Labs:    BMP:    Recent Labs  Lab 07/15/18  0737 07/15/18  1601 07/16/18  0344    142 141   K 4.5 4.1 5.3*   * 113* 112*   CO2 19* 21* 20*   BUN 33* 28* 26*   CREATININE 1.5* 1.3 1.2   CALCIUM 8.3* 8.3* 8.3*       CBC:     Recent Labs  Lab 07/15/18  1927 07/15/18  2348 07/16/18  0344   WBC 10.99 10.19 8.85   HGB 7.9* 7.3* 7.1*   HCT 23.2* 22.0* 21.4*   PLT 48* 42* 45*       All pertinent labs results from the past 24 hours have been reviewed.    Significant Imaging:  All pertinent imaging results/findings from the past 24 hours have been reviewed.                  Assessment/Plan:     * Perinephric hematoma    - Patient is hemodynamically stable s/p 5 units PRBCs, 2FFP, 2 Platelets, continue to trend CBCs  - Recommend spacing out trending CBCs to q 6 hours  - Continue Critical care admission  - maintain INR at normal level   - continue PBRC, FFP, platelet transfusions as needed   - continue bed rest   - Recommend removing chew catheter  - Recommend stopping IV antibiotics, suspicion for infection is low  - no surgical intervention at this time   - recommend incentive spirometry   - no further imaging needed at this time, negative CTA yesterday  - okay for liquids today   Do not send to interventional radiology for intervention without discussing with urology  - please call with questions or concerns.               VTE Risk Mitigation         Ordered     Place MARYJO hose  Until discontinued       07/13/18 1500     IP VTE LOW RISK PATIENT  Once      07/13/18 1500          Martinez Reese MD  Urology  [facility]

## 2018-07-16 NOTE — CONSULTS
"[facility]  Adult Nutrition  Consult Note    RD received consult for "patient identified as at risk of developing a pressure injury" which does not warrant RD assessment. Should patient remain hospitalized 10 days, RD will follow-up and assess 2/2 LOS. Please reconsult as needed.  "

## 2018-07-16 NOTE — PT/OT/SLP PROGRESS
Occupational Therapy   Treatment    Name: Emiliana Persaud  MRN: [id number]  Admitting Diagnosis:  Perinephric hematoma  1 Day Post-Op    Recommendations:     Discharge Recommendations: home with home health  Discharge Equipment Recommendations:   (TBD closer to d/c)  Barriers to discharge:  None    Subjective     Communicated with: RN prior to session.  Pain/Comfort:  · Pain Rating 1: 2/10  · Location - Side 1: Bilateral  · Location - Orientation 1: generalized  · Location 1: abdomen  · Pain Addressed 1: Distraction  · Pain Rating Post-Intervention 1: 2/10    Patients cultural, spiritual, Church conflicts given the current situation: none reported    Objective:     Patient found with: blood pressure cuff, pulse ox (continuous), telemetry, peripheral IV, oxygen, chew catheter    General Precautions: Standard, fall   Orthopedic Precautions:    Braces:       Occupational Performance:    Bed Mobility:    · Patient completed Rolling/Turning to Left with  contact guard assistance  · Patient completed Scooting/Bridging with contact guard assistance  · Patient completed Supine to Sit with contact guard assistance     Functional Mobility/Transfers:  · Patient completed Sit <> Stand Transfer with minimum assistance  with  no assistive device from EOB,  b/s chair, and toilet  · Patient completed Bed <> Chair Transfer using Step Transfer technique with minimum assistance with no assistive device  · Functional Mobility: Minimal A to/from bathroom    Activities of Daily Living:  · Grooming: contact guard assistance standing at sink  · Upper Body Dressing: minimum assistance    · Lower Body Dressing: moderate assistance    · Toileting: maximal assistance for pericare in standing    Patient left up in chair with all lines intact, call button in reach, rn notified and dtr present    [facility] 6 Click:  [facility] Total Score: 16    Treatment & Education:  Pt ed on OT POC  Pt stood at sink x 3 minutes while performing self-care  tasks  Pt/family ed on importance of increasing activity as well as pt performance in functional activities  Education:    Assessment:     Emiliana Persaud is a 62 y.o. female with a medical diagnosis of Perinephric hematoma.  Performance deficits affecting function are weakness, gait instability, impaired balance, impaired endurance, impaired self care skills, impaired functional mobilty, impaired cardiopulmonary response to activity.      Rehab Prognosis:  Good; patient would benefit from acute skilled OT services to address these deficits and reach maximum level of function.       Plan:     Patient to be seen 4 x/week to address the above listed problems via self-care/home management, therapeutic activities, therapeutic exercises  · Plan of Care Expires: 08/13/18  · Plan of Care Reviewed with: patient, daughter    This Plan of care has been discussed with the patient who was involved in its development and understands and is in agreement with the identified goals and treatment plan    GOALS:    Occupational Therapy Goals        Problem: Occupational Therapy Goal    Goal Priority Disciplines Outcome Interventions   Occupational Therapy Goal     OT, PT/OT Ongoing (interventions implemented as appropriate)    Description:  Goals to be met by: 7/22/18     Patient will increase functional independence with ADLs by performing:    UE Dressing with Moderate Assistance.  Grooming while seated with Stand-by Assistance.  Toileting from bedside commode with Maximum Assistance for hygiene and clothing management.   Sitting at edge of bed x15 minutes with Stand-by Assistance.  Rolling to Bilateral with Stand-by Assistance.   Supine to sit with Minimal Assistance.  Stand pivot transfers with Moderate Assistance.                      Time Tracking:     OT Date of Treatment: 07/16/18  OT Start Time: 0910  OT Stop Time: 0934  OT Total Time (min): 24 min    Billable Minutes:Self Care/Home Management 24    Honey Alba  LOTR  7/16/2018

## 2018-07-16 NOTE — PROGRESS NOTES
[facility]  Critical Care Medicine  Progress Note    Patient Name: Emiliana Persaud  MRN: [id number]  Admission Date: 7/13/2018  Hospital Length of Stay: 3 days  Code Status: Full Code  Attending Provider: Ayan Figueroa MD  Primary Care Provider: [facility]   Principal Problem: Perinephric hematoma    Subjective:     HPI:   Patient is a 63 yo F with significant past medical history of antiphospholipid syndrome, thyroid cancer, IBS, brain aneurysm, and chronic migraines who presents from [facility] ED in [place], [place] with concerns of a perinephric hematoma.    Patient states that the pain began 7/12 at 11 pm. It progressively got worse. She did not try anything for the pain. The pain has been constant. It radiates to the right abdomen. Patient endorsed hematuria, HA, nausea, thirst, dry mouth. he denies chest pain, SOB. CT at OSH showed perinephric hematoma. Outside labs show INR of 2.6.  OSH labs wre WBC/10.9, H/H 13.1/37.6  One unit of blood was given in route to [facility]. Patient received morphine, dilaudid, and phenergen at outside hospital.     Patient completed 2 blood transfusions in [facility] ED.  At that time, patient was admitted to hospital medicine.  Hg was 10.7 s/p 2 U.  Urology and IR consulted for retroperitoneal bleed.  They stated that there was intervention needed at this time.  In the evening of 7/13, code response team was called due to patient feeling lethargic, weak, and diaphoretic. Team went to see patient. Extremely lethargic but easily aroused.  Oriented X4 with no motor deficits noted.  BS performed revealed a BS of 258.   BP 94/65 RR 12 O2 94% on RA.  Placed on 2L NC.  Labs including POCT glucose, ABG, lactic acid, CMP, CBC, PT/INR were ordered. Blood pressure started dropping: SBP running in the mid 90s and DBP in the mid 40s-low 50s. Bolus of saline and FFP were ordered and administered.     Consulted ICU for hypotension.      Hospital/ICU Course:  07/14 - 63 yo F with PMHx antiphospholipid syndrome, RLE DVT (resolved), right acoustic neuroma, thyroid ca, brain aneurysm admitted from OSH with spontaneous perinephric hematoma as denies trauma or recent surgical intervention. INR 5 on admission, s/p 3u PRBCs total.    - Due to dropping blood pressures (SBP in mid 80s with DBP in mid 50s) admitted to ICU   -since admit to the ICU she has had adequate BP with SBP >120 and MAPs >65  Urology and IR consulted overnight and following.  07/15 - Multiple blood products transfused throughout the day. CTA was unable to appreciate any active bleeding.  07/16 - Bilateral LE Ultrasound. Discussed IVC Filter placement    Interval History/Significant Events: Pt reports symptomatic improvement from yesterday. No longer SOB, experiencing chest tightness or nausea. Pt abdominal pain improved. Discussed IVC filter placement with pt.     Review of Systems   Constitutional: Negative for chills and diaphoresis.   HENT: Negative for nosebleeds and sore throat.    Eyes: Negative for discharge and visual disturbance.   Respiratory: Negative for chest tightness, shortness of breath and wheezing.    Cardiovascular: Negative for chest pain and leg swelling.   Gastrointestinal: Negative for abdominal pain and blood in stool.        Abdominal pain improving   Endocrine: Negative for cold intolerance and heat intolerance.   Genitourinary: Negative for hematuria and urgency.   Musculoskeletal: Negative for myalgias and neck stiffness.   Skin: Negative for color change and wound.   Neurological: Negative for dizziness and facial asymmetry.   Hematological: Negative for adenopathy. Does not bruise/bleed easily.   Psychiatric/Behavioral: Negative for decreased concentration and suicidal ideas.     Objective:     Vital Signs (Most Recent):  Temp: 98.5 °F (36.9 °C) (07/16/18 1100)  Pulse: 88 (07/16/18 1200)  Resp: (!) 22 (07/16/18 1200)  BP: 123/64 (07/16/18 1200)  SpO2:  99 % (07/16/18 1200) Vital Signs (24h Range):  Temp:  [98 °F (36.7 °C)-99.3 °F (37.4 °C)] 98.5 °F (36.9 °C)  Pulse:  [] 88  Resp:  [18-36] 22  SpO2:  [95 %-100 %] 99 %  BP: (111-147)/(59-82) 123/64   Weight: 104 kg (229 lb 4.5 oz)  Body mass index is 34.86 kg/m².      Intake/Output Summary (Last 24 hours) at 07/16/18 1245  Last data filed at 07/16/18 1100   Gross per 24 hour   Intake             2270 ml   Output             1590 ml   Net              680 ml       Physical Exam   Constitutional: No distress.   HENT:   Head: Normocephalic and atraumatic.   Eyes: Conjunctivae are normal. Pupils are equal, round, and reactive to light.   Neck: Normal range of motion. No thyromegaly present.   Cardiovascular: Normal rate, regular rhythm and normal heart sounds.  Exam reveals no gallop and no friction rub.    No murmur heard.  Pulmonary/Chest: Effort normal and breath sounds normal.   Abdominal: Soft. She exhibits no distension. There is no tenderness.   Minimally tender   Musculoskeletal: Normal range of motion. She exhibits no deformity.   Neurological: No cranial nerve deficit.   Skin: Skin is warm and dry. Capillary refill takes less than 2 seconds. She is not diaphoretic.   Psychiatric: She has a normal mood and affect.   Nursing note and vitals reviewed.      Vents:  Oxygen Concentration (%): 28 (07/13/18 2329)  Lines/Drains/Airways     Drain                 Urethral Catheter 07/13/18 2230 Latex 2 days          Peripheral Intravenous Line                 Peripheral IV - Single Lumen 07/13/18 1353 Right Forearm 2 days         Peripheral IV - Single Lumen 07/13/18 1831 Right Hand 2 days         Peripheral IV - Single Lumen 07/13/18 1902 Right Antecubital 2 days              Significant Labs:    CBC/Anemia Profile:    Recent Labs  Lab 07/16/18  0344 07/16/18  0730 07/16/18  1200   WBC 8.85 8.74 8.32   HGB 7.1* 7.7* 7.9*   HCT 21.4* 23.6* 23.4*   PLT 45* 52* 46*   MCV 91 93 92   RDW 14.5 14.2 14.2         Chemistries:    Recent Labs  Lab 07/14/18  1912 07/15/18  0414  07/15/18  1601 07/16/18  0344 07/16/18  0730    141  < > 142 141 140   K 4.8 4.3  < > 4.1 5.3* 4.1   * 111*  < > 113* 112* 113*   CO2 20* 19*  < > 21* 20* 20*   BUN 37* 35*  < > 28* 26* 25*   CREATININE 1.8* 1.6*  < > 1.3 1.2 1.1   CALCIUM 8.7 8.3*  < > 8.3* 8.3* 8.0*   ALBUMIN 2.9* 2.6*  < > 2.7* 2.4* 2.5*   PROT 6.0 5.5*  < > 5.9* 6.1 5.8*   BILITOT 1.8* 2.0*  < > 2.8* 2.2* 2.2*   ALKPHOS 77 78  < > 88 88 86   * 186*  < > 174* 142* 126*   * 215*  < > 178* 133* 102*   MG 2.0 1.8  --   --  3.1*  --    PHOS 4.3 3.4  --   --  3.5  --    < > = values in this interval not displayed.    Recent Lab Results       07/16/18  1200 07/16/18  0730 07/16/18  0344 07/15/18  2348 07/15/18  1927      Immature Granulocytes 1.0(H) 1.1(H) 1.1(H) 1.3(H) 1.4(H)     Immature Grans (Abs) 0.08  Comment:  Mild elevation in immature granulocytes is non specific and   can be seen in a variety of conditions including stress response,   acute inflammation, trauma and pregnancy. Correlation with other   laboratory and clinical findings is essential.  (H) 0.10  Comment:  Mild elevation in immature granulocytes is non specific and   can be seen in a variety of conditions including stress response,   acute inflammation, trauma and pregnancy. Correlation with other   laboratory and clinical findings is essential.  (H) 0.10  Comment:  Mild elevation in immature granulocytes is non specific and   can be seen in a variety of conditions including stress response,   acute inflammation, trauma and pregnancy. Correlation with other   laboratory and clinical findings is essential.  (H) 0.13  Comment:  Mild elevation in immature granulocytes is non specific and   can be seen in a variety of conditions including stress response,   acute inflammation, trauma and pregnancy. Correlation with other   laboratory and clinical findings is essential.  (H) 0.15  Comment:  Mild  elevation in immature granulocytes is non specific and   can be seen in a variety of conditions including stress response,   acute inflammation, trauma and pregnancy. Correlation with other   laboratory and clinical findings is essential.  (H)     Albumin  2.5(L) 2.4(L)       Alkaline Phosphatase  86 88       ALT  126(H) 142(H)       Anion Gap  7(L) 9       aPTT   35.7  Comment:  aPTT therapeutic range = 39-69 seconds(H) 38.4  Comment:  aPTT therapeutic range = 39-69 seconds(H) 34.9  Comment:  aPTT therapeutic range = 39-69 seconds(H)     AST  102(H) 133  Comment:  *Result may be interfered by visible hemolysis(H)       Baso # 0.00 0.00 0.00 0.00 0.00     Basophil% 0.0 0.0 0.0 0.0 0.0     Total Bilirubin  2.2  Comment:  For infants and newborns, interpretation of results should be based  on gestational age, weight and in agreement with clinical  observations.  Premature Infant recommended reference ranges:  Up to 24 hours.............<8.0 mg/dL  Up to 48 hours............<12.0 mg/dL  3-5 days..................<15.0 mg/dL  6-29 days.................<15.0 mg/dL  (H) 2.2  Comment:  For infants and newborns, interpretation of results should be based  on gestational age, weight and in agreement with clinical  observations.  Premature Infant recommended reference ranges:  Up to 24 hours.............<8.0 mg/dL  Up to 48 hours............<12.0 mg/dL  3-5 days..................<15.0 mg/dL  6-29 days.................<15.0 mg/dL  (H)       BUN, Bld  25(H) 26(H)       Calcium  8.0(L) 8.3(L)       Chloride  113(H) 112(H)       CO2  20(L) 20(L)       Creatinine  1.1 1.2       Differential Method Automated Automated Automated Automated Automated     eGFR if   >60.0 56.4(A)       eGFR if non   54.3  Comment:  Calculation used to obtain the estimated glomerular filtration  rate (eGFR) is the CKD-EPI equation.   (A) 48.9  Comment:  Calculation used to obtain the estimated glomerular filtration  rate  (eGFR) is the CKD-EPI equation.   (A)       Eos # 0.0 0.0 0.0 0.0 0.0     Eosinophil% 0.0 0.0 0.0 0.0 0.1     Glucose  117(H) 122(H)       Gran # (ANC) 6.8 7.3 7.3 8.6(H) 8.9(H)     Gran% 81.4(H) 83.3(H) 82.0(H) 84.2(H) 81.2(H)     Hematocrit 23.4(L) 23.6(L) 21.4(L) 22.0(L) 23.2(L)     Hemoglobin 7.9(L) 7.7(L) 7.1(L) 7.3(L) 7.9(L)     Coumadin Monitoring INR   1.1  Comment:  Coumadin Therapy:  2.0 - 3.0 for INR for all indicators except mechanical heart valves  and antiphospholipid syndromes which should use 2.5 - 3.5.   1.1  Comment:  Coumadin Therapy:  2.0 - 3.0 for INR for all indicators except mechanical heart valves  and antiphospholipid syndromes which should use 2.5 - 3.5.   1.1  Comment:  Coumadin Therapy:  2.0 - 3.0 for INR for all indicators except mechanical heart valves  and antiphospholipid syndromes which should use 2.5 - 3.5.       Lymph # 0.6(L) 0.6(L) 0.7(L) 0.7(L) 0.8(L)     Lymph% 7.0(L) 6.3(L) 7.3(L) 6.4(L) 7.6(L)     Magnesium   3.1(H)       MCH 31.0 30.3 30.2 30.7 30.7     MCHC 33.8 32.6 33.2 33.2 34.1     MCV 92 93 91 92 90     Mono # 0.9 0.8 0.9 0.8 1.1(H)     Mono% 10.6 9.3 9.6 8.1 9.7     MPV 10.4 10.3 10.1 10.0 10.3     nRBC 0 0 0 0 0     Pathologist Review          Pathologist Review Peripheral Smear          Phosphorus   3.5       Platelets 46(L) 52(L) 45(L) 42(L) 48(L)     Potassium  4.1 5.3(H)       Total Protein  5.8(L) 6.1  Comment:  *Result may be interfered by visible hemolysis       Protime   11.4 11.4 11.6     RBC 2.55(L) 2.54(L) 2.35(L) 2.38(L) 2.57(L)     RDW 14.2 14.2 14.5 14.3 14.5     Sodium  140 141       Troponin I          WBC 8.32 8.74 8.85 10.19 10.99                 07/15/18  1601      Immature Granulocytes 1.1(H)     Immature Grans (Abs) 0.13  Comment:  Mild elevation in immature granulocytes is non specific and   can be seen in a variety of conditions including stress response,   acute inflammation, trauma and pregnancy. Correlation with other   laboratory and  clinical findings is essential.  (H)     Albumin 2.7(L)     Alkaline Phosphatase 88     (H)     Anion Gap 8     aPTT 36.8  Comment:  aPTT therapeutic range = 39-69 seconds(H)     (H)     Baso # 0.01     Basophil% 0.1     Total Bilirubin 2.8  Comment:  For infants and newborns, interpretation of results should be based  on gestational age, weight and in agreement with clinical  observations.  Premature Infant recommended reference ranges:  Up to 24 hours.............<8.0 mg/dL  Up to 48 hours............<12.0 mg/dL  3-5 days..................<15.0 mg/dL  6-29 days.................<15.0 mg/dL  (H)     BUN, Bld 28(H)     Calcium 8.3(L)     Chloride 113(H)     CO2 21(L)     Creatinine 1.3     Differential Method Automated     eGFR if  51.2(A)     eGFR if non  44.4  Comment:  Calculation used to obtain the estimated glomerular filtration  rate (eGFR) is the CKD-EPI equation.   (A)     Eos # 0.0     Eosinophil% 0.0     Glucose 107     Gran # (ANC) 9.9(H)     Gran% 80.2(H)     Hematocrit 24.1(L)     Hemoglobin 7.9(L)     Coumadin Monitoring INR 1.1  Comment:  Coumadin Therapy:  2.0 - 3.0 for INR for all indicators except mechanical heart valves  and antiphospholipid syndromes which should use 2.5 - 3.5.       Lymph # 1.0     Lymph% 8.3(L)     Magnesium      MCH 29.9     MCHC 32.8     MCV 91     Mono # 1.3(H)     Mono% 10.3     MPV 9.5     nRBC 0     Pathologist Review Review completed     Pathologist Review Peripheral Smear REVIEWED  Comment:  Electronically reviewed and signed by:  Pat George M.D.  Signed on 07/16/18 at 10:20  PATHOLOGIST INTERPRETATION  RBC- Normocytic anemia without significant morphologic abnormalities.   WBC- Granulocytic predominance without significant morphologic   abnormalities.  There is no dysplasia or circulating blasts.  PLT- Thrombocytopenia, normal platelet morphology and no clumping.  Interpreted by GAURANG SellersD.        Phosphorus      Platelets 40(L)     Potassium 4.1     Total Protein 5.9(L)     Protime 11.7     RBC 2.64(L)     RDW 14.5     Sodium 142     Troponin I 0.032  Comment:  The reference interval for Troponin I represents the 99th percentile   cutoff   for our facility and is consistent with 3rd generation assay   performance.  (H)     WBC 12.38         All pertinent labs within the past 24 hours have been reviewed.    Significant Imaging:  I have reviewed all pertinent imaging results/findings within the past 24 hours.    Assessment/Plan:     Neuro   Brain aneurysm    -patient reports that she has a brain aneurysm for which she takes lisinopril 20mg        Renal/   Hyperkalemia    -resolved        MAURO (acute kidney injury)    -patients Cr increased to 1.4 and then 1.6 (no kidney issues on baseline)  -most likely 2/2 to hypoperfusion  - Creatinine trend from 1.8 to 1.5 to 1.1 most recently        Hematology   Antiphospholipid syndrome    - On Coumadin at home (held for now) - Consider IVC Filter in the interim  - INR corrected (5 on admission - currently 1.1)   - F/U - DRVVT, Cardiolipin ab, Phosphatidylserine ab, Bete-2 glycoprotein ab   - Haptoglobin - 199  - Fibrinogen - 518  - D-dimer quant - 1.81  - Bilateral LE ultrasound - NO EVIDENCE OF DVT IN EITHER LOWER EXTREMITY          Oncology   Leukocytosis    -  Resolved  - Procal insignificant  - Vanc and Zosyn D/C'd        Endocrine   Hypothyroidism    -patient is s/p thyroid CA and had a thyroidectomy  -she takes synthroid daily 150 mcg        GI   Transaminitis    rising LFTs - CBD at 7mm but no intrahepatic dilatation, will consider need for GI consult  - LFT's trending downward        Orthopedic   * Perinephric hematoma    - Perinephric hematoma measuring [id number] with evidence of mass effect on right kidney as well as free fluid noted in pelvis on CT scan (unknown how this compares to outside CT as no records/CT scan were available to MICU team - Urology has  previous CT in there possession and are following patient)     - Intra-abdominal HTN -Bladder Pressure - 13 trend upward to 15    - Troponin I - 0.05 - Pt with resolved episode of chest tightness and shortness of breath  - Repeat Troponin 0.032    - CXR on 7/14 and repeat on 7/15 - There are small bilateral pleural effusions.  There is bibasilar atelectasis (Pt on 6L nasal cannula sat'ing well, but requiring for symptomatic relief), CTA studies show some mild (R) lung compressive atelectasis    - CTA states large right perinephric hematoma without contrast extravasation/pooling to suggest active hemorrhage.  The size is grossly stable when compared to yesterday's examination, and there is stable mass effect on the adjacent structures. Small amount of dependent pelvic fluid, decreased in the interim.    - Urology and IR on board - appreciate there recs  -CBC and INR has been monitored q4h - Transfuse appropriately            Eric Lara MD  Critical Care Medicine  [facility]

## 2018-07-16 NOTE — PLAN OF CARE
Problem: Patient Care Overview  Goal: Plan of Care Review  Outcome: Ongoing (interventions implemented as appropriate)  POC reviewed w pt and daughter. Pt AAOx4. Following commands. NSR. Oxygenating well on 5L NC. BP WNL. Afebrile. Trending CBC q4h. H&H and platelets trending down. Plan to transfuse w/ 1 pRBCs and 1 platelets. Pain controlled w Fentanyl 50 mcg q2h. Abdomen remains soft but tender. Orellana in place w/ adequate UOP. IAP 15mmHG. Remains NPO. Pt taken for CTA. Tolerated well. Results show no active bleeding.  All questions and concerns addressed. Will monitor closely.

## 2018-07-16 NOTE — PLAN OF CARE
[facility]  [address] / [place] [facility] [place] [address]     [place] - [place] [facility] [place] - [address]  [address]  [address] [facility] [place] [address]  Phone: [phone] Fax: [phone]    Payor:  / Plan:  [employer] / Product Type: Government /     Future Appointments  Date Time Provider Department Center   [facility]     Extended Emergency Contact Information  Primary Emergency Contact: Arthur Persaud   [place]  Mobile Phone: [phone]  Relation: Son      07/16/18 1248   Discharge Assessment   Assessment Type Discharge Planning Assessment   Confirmed/corrected address and phone number on facesheet? Yes   Assessment information obtained from? Patient;Medical Record   Expected Length of Stay (days) 4   Communicated expected length of stay with patient/caregiver no   Prior to hospitilization cognitive status: Alert/Oriented   Prior to hospitalization functional status: Independent   Current cognitive status: Alert/Oriented   Current Functional Status: Needs Assistance   Facility Arrived From: [facility] ED   Lives With spouse   Able to Return to Prior Arrangements yes   Is patient able to care for self after discharge? Yes   Who are your caregiver(s) and their phone number(s)? HungCarmeloArthur (Son) [phone]   Patient's perception of discharge disposition home or selfcare   Readmission Within The Last 30 Days no previous admission in last 30 days   Patient currently being followed by outpatient case management? No   Patient currently receives any other outside agency services? No   Equipment Currently Used at Home other (see comments)  (Shower bench in walk in shower)   Do you have any problems affording any of your prescribed medications? No   Is the patient taking medications as prescribed? yes   Does the patient have transportation home? Yes   Transportation Available family or friend will provide;car    Does the patient receive services at the Coumadin Clinic? No   Discharge Plan A Home with family;Home Health   Discharge Plan B Rehab   Patient/Family In Agreement With Plan yes   Shonda Arriaga RN, BSN, CCM  Case Management  [facility]  Ext. [phone]

## 2018-07-16 NOTE — ASSESSMENT & PLAN NOTE
- Patient is hemodynamically stable s/p 5 units PRBCs, 2FFP, 2 Platelets, continue to trend CBCs  - Recommend spacing out trending CBCs to q 6 hours  - Continue Critical care admission  - maintain INR at normal level   - continue PBRC, FFP, platelet transfusions as needed   - continue bed rest   - Recommend removing chew catheter  - Recommend stopping IV antibiotics, suspicion for infection is low  - no surgical intervention at this time   - recommend incentive spirometry   - no further imaging needed at this time, negative CTA yesterday  - okay for liquids today   Do not send to interventional radiology for intervention without discussing with urology  - please call with questions or concerns.

## 2018-07-16 NOTE — ASSESSMENT & PLAN NOTE
- Perinephric hematoma measuring [id number] with evidence of mass effect on right kidney as well as free fluid noted in pelvis on CT scan (unknown how this compares to outside CT as no records/CT scan were available to MICU team - Urology has previous CT in there possession and are following patient)     - Intra-abdominal HTN -Bladder Pressure - 13 trend upward to 15    - Troponin I - 0.05 - Pt with resolved episode of chest tightness and shortness of breath  - Repeat Troponin 0.032    - CXR on 7/14 and repeat on 7/15 - There are small bilateral pleural effusions.  There is bibasilar atelectasis (Pt on 6L nasal cannula sat'ing well, but requiring for symptomatic relief), CTA studies show some mild (R) lung compressive atelectasis    - CTA states large right perinephric hematoma without contrast extravasation/pooling to suggest active hemorrhage.  The size is grossly stable when compared to yesterday's examination, and there is stable mass effect on the adjacent structures. Small amount of dependent pelvic fluid, decreased in the interim.    -CBC and INR has been monitored q4h - Transfuse appropriately    - Urology and IR on board - appreciate there recs

## 2018-07-16 NOTE — PLAN OF CARE
Problem: Occupational Therapy Goal  Goal: Occupational Therapy Goal  Goals to be met by: 7/22/18     Patient will increase functional independence with ADLs by performing:    UE Dressing with Moderate Assistance.  Grooming while seated with Stand-by Assistance.  Toileting from bedside commode with Maximum Assistance for hygiene and clothing management.   Sitting at edge of bed x15 minutes with Stand-by Assistance.  Rolling to Bilateral with Stand-by Assistance.   Supine to sit with Minimal Assistance.  Stand pivot transfers with Moderate Assistance.     Outcome: Ongoing (interventions implemented as appropriate)  The above goals remain appropriate. SALIMA Arroyo  7/16/2018

## 2018-07-16 NOTE — PT/OT/SLP EVAL
Physical Therapy Evaluation    Patient Name:  Emiliana Persaud   MRN:  [id number]    Recommendations:     Discharge Recommendations:  home with home health   Discharge Equipment Recommendations: none   Barriers to discharge: None    Assessment:     Emiliana Persaud is a 62 y.o. female admitted with a medical diagnosis of Perinephric hematoma.  She presents with the following impairments/functional limitations:  impaired balance, impaired functional mobilty, impaired self care skills, pain .    Rehab Prognosis:  good; patient would benefit from acute skilled PT services to address these deficits and reach maximum level of function.      Recent Surgery: Procedure(s) (LRB):  CTA (N/A) 1 Day Post-Op    Plan:     During this hospitalization, patient to be seen 4 x/week to address the above listed problems via gait training, therapeutic activities, therapeutic exercises  · Plan of Care Expires:      Plan of Care Reviewed with: patient    Subjective     Communicated with RN prior to session.  Patient found supine upon PT entry to room, agreeable to evaluation.      Chief Complaint: Pt wanting to have a BM.  Pain/Comfort:  · Pain Rating 1: 5/10  · Location 1: abdomen  · Pain Addressed 1: Reposition, Distraction    Living Environment:  Lives with spouse in 1-story home with 1 ERASMO.  Prior to admission, patients level of function was indepenedent.  Patient has the following equipment: none.  DME owned (not currently used): none.  Upon discharge, patient will have assistance from spouse.    Objective:     Patient found with:  (Bp cuff, tele, pulse ox, IV, oxygen)     General Precautions: Standard,  (fall)   Orthopedic Precautions:N/A   Braces: N/A     Exams:  · Cognitive Exam:  Patient is oriented to Person, Place, Time and Situation and follows 100% of verbal commands     Functional Mobility:  · Bed Mobility:     · Supine to Sit: contact guard assistance  · Transfers:     · Sit to Stand:  minimum assistance with no AD  · Gait:  fair  · Balance: Ambulated x 20 feet x 2 trials with min assist and HHA    AM-PAC 6 CLICK MOBILITY  Total Score:16       Therapeutic Activities and Exercises:  Educated on importance of OOB.    Patient left up in chair with all lines intact and call button in reach.    GOALS:    Physical Therapy Goals        Problem: Physical Therapy Goal    Goal Priority Disciplines Outcome Goal Variances Interventions   Physical Therapy Goal     PT/OT, PT Ongoing (interventions implemented as appropriate)     Description:  Goals to be met by:      Patient will increase functional independence with mobility by performin. Supine to sit with Stand-by Assistance  2. Sit to supine with Stand-by Assistance  3. Sit to stand transfer with Stand-by Assistance  4. Gait  x 100 feet with Stand-by Assistance                     History:     Past Medical History:   Diagnosis Date    Acute DVT (deep venous thrombosis) 2018    Antiphospholipid antibody syndrome     Brain aneurysm     Cancer     GERD (gastroesophageal reflux disease)     Migraine headache     Thyroid disease        Past Surgical History:   Procedure Laterality Date    CHOLECYSTECTOMY      COMPUTED TOMOGRAPHY N/A 7/15/2018    Procedure: CTA;  Surgeon: Shellie Surgeon;  Location: [facility];  Service: Anesthesiology;  Laterality: N/A;    HERNIA REPAIR      KNEE SURGERY      THYROID SURGERY         Clinical Decision Making:     History  Co-morbidities and personal factors that may impact the plan of care Examination  Body Structures and Functions, activity limitations and participation restrictions that may impact the plan of care Clinical Presentation   Decision Making/ Complexity Score   Co-morbidities:   [] Time since onset of injury / illness / exacerbation  [] Status of current condition  []Patient's cognitive status and safety concerns    [] Multiple Medical Problems (see med hx)  Personal Factors:   [] Patient's age  [] Prior Level of function   []  Patient's home situation (environment and family support)  [] Patient's level of motivation  [] Expected progression of patient      HISTORY:(criteria)    [] 31724 - no personal factors/history    [] 46000 - has 1-2 personal factor/comorbidity     [] 83727 - has >3 personal factor/comorbidity     Body Regions:  [] Objective examination findings  [] Head     []  Neck  [] Trunk   [] Upper Extremity  [] Lower Extremity    Body Systems:  [] For communication ability, affect, cognition, language, and learning style: the assessment of the ability to make needs known, consciousness, orientation (person, place, and time), expected emotional /behavioral responses, and learning preferences (eg, learning barriers, education  needs)  [] For the neuromuscular system: a general assessment of gross coordinated movement (eg, balance, gait, locomotion, transfers, and transitions) and motor function  (motor control and motor learning)  [] For the musculoskeletal system: the assessment of gross symmetry, gross range of motion, gross strength, height, and weight  [] For the integumentary system: the assessment of pliability(texture), presence of scar formation, skin color, and skin integrity  [] For cardiovascular/pulmonary system: the assessment of heart rate, respiratory rate, blood pressure, and edema     Activity limitations:    [] Patient's cognitive status and saf ety concerns          [] Status of current condition      [] Weight bearing restriction  [] Cardiopulmunary Restriction    Participation Restrictions:   [] Goals and goal agreement with the patient     [] Rehab potential (prognosis) and probable outcome      Examination of Body System: (criteria)    [] 60156 - addressing 1-2 elements    [] 89082 - addressing a total of 3 or more elements     [] 05272 -  Addressing a total of 4 or more elements         Clinical Presentation: (criteria)  Choose one     On examination of body system using standardized tests and measures  patient presents with (CHOOSE ONE) elements from any of the following: body structures and functions, activity limitations, and/or participation restrictions.  Leading to a clinical presentation that is considered (CHOOSE ONE)                              Clinical Decision Making  (Eval Complexity):  Choose One     Time Tracking:     PT Received On: 07/16/18  PT Start Time: 0900     PT Stop Time: 0925  PT Total Time (min): 25 min     Billable Minutes: Evaluation 15      Sangita Melendez, PT  07/16/2018

## 2018-07-16 NOTE — PLAN OF CARE
Patient currently hemodynamically stable  Remains tachycardic  H&H trending downward  1 unit PRBC and 2 units platelets given during shift  Orellana in place, adeqaute U/O  PRN fentanyl changed to 50mcg q 2 hours for adequate pain control  Plan to take patient to CT for CTA abdomen pelvis  Patient already premedicated with with hydrocortisone, famotidine, and benadryl  Awaiting to hear from anesthesia for the case  Plan for possible IR after CT  Plan of care reviewed with pt and family  Will continue to monitor

## 2018-07-16 NOTE — ASSESSMENT & PLAN NOTE
-patients Cr increased to 1.4 and then 1.6 (no kidney issues on baseline)  -most likely 2/2 to hypoperfusion  - Creatinine trend from 1.8 to 1.5 to 1.1 most recently

## 2018-07-16 NOTE — PLAN OF CARE
Problem: Patient Care Overview  Goal: Plan of Care Review  Pt VSS at this time. HR NSR at 80 bpm on cardiac monitoring, BP sys 110-120s, O2 100 on 6 L nasal cannula, afebrile. AAOx4, following commands and moving all extremities well. Family member at bedside, updated on current condition and POC for am shift. All questions answered and emotional support provided. IV ABX given this shift and PO tylenol for abd pain. 2 liquid BM passed. Trending CBCs Q4; H&H stable at 7.3 and 23.0. Pt assisted with weight shift, and encouraged to cough/deep breathe. Remains free of falls and injury for am shift. MD Carolina updated on current condition, vitals, labs, and gtts. No new orders received, will continue to monitor.

## 2018-07-16 NOTE — SUBJECTIVE & OBJECTIVE
Interval History/Significant Events: Pt reports symptomatic improvement from yesterday. No longer SOB, experiencing chest tightness or nausea. Pt abdominal pain improved. Discussed IVC filter placement with pt.     Review of Systems   Constitutional: Negative for chills and diaphoresis.   HENT: Negative for nosebleeds and sore throat.    Eyes: Negative for discharge and visual disturbance.   Respiratory: Negative for chest tightness, shortness of breath and wheezing.    Cardiovascular: Negative for chest pain and leg swelling.   Gastrointestinal: Negative for abdominal pain and blood in stool.        Abdominal pain improving   Endocrine: Negative for cold intolerance and heat intolerance.   Genitourinary: Negative for hematuria and urgency.   Musculoskeletal: Negative for myalgias and neck stiffness.   Skin: Negative for color change and wound.   Neurological: Negative for dizziness and facial asymmetry.   Hematological: Negative for adenopathy. Does not bruise/bleed easily.   Psychiatric/Behavioral: Negative for decreased concentration and suicidal ideas.     Objective:     Vital Signs (Most Recent):  Temp: 98.5 °F (36.9 °C) (07/16/18 1100)  Pulse: 88 (07/16/18 1200)  Resp: (!) 22 (07/16/18 1200)  BP: 123/64 (07/16/18 1200)  SpO2: 99 % (07/16/18 1200) Vital Signs (24h Range):  Temp:  [98 °F (36.7 °C)-99.3 °F (37.4 °C)] 98.5 °F (36.9 °C)  Pulse:  [] 88  Resp:  [18-36] 22  SpO2:  [95 %-100 %] 99 %  BP: (111-147)/(59-82) 123/64   Weight: 104 kg (229 lb 4.5 oz)  Body mass index is 34.86 kg/m².      Intake/Output Summary (Last 24 hours) at 07/16/18 1245  Last data filed at 07/16/18 1100   Gross per 24 hour   Intake             2270 ml   Output             1590 ml   Net              680 ml       Physical Exam   Constitutional: No distress.   HENT:   Head: Normocephalic and atraumatic.   Eyes: Conjunctivae are normal. Pupils are equal, round, and reactive to light.   Neck: Normal range of motion. No thyromegaly  present.   Cardiovascular: Normal rate, regular rhythm and normal heart sounds.  Exam reveals no gallop and no friction rub.    No murmur heard.  Pulmonary/Chest: Effort normal and breath sounds normal.   Abdominal: Soft. She exhibits no distension. There is no tenderness.   Minimally tender   Musculoskeletal: Normal range of motion. She exhibits no deformity.   Neurological: No cranial nerve deficit.   Skin: Skin is warm and dry. Capillary refill takes less than 2 seconds. She is not diaphoretic.   Psychiatric: She has a normal mood and affect.   Nursing note and vitals reviewed.      Vents:  Oxygen Concentration (%): 28 (07/13/18 2329)  Lines/Drains/Airways     Drain                 Urethral Catheter 07/13/18 2230 Latex 2 days          Peripheral Intravenous Line                 Peripheral IV - Single Lumen 07/13/18 1353 Right Forearm 2 days         Peripheral IV - Single Lumen 07/13/18 1831 Right Hand 2 days         Peripheral IV - Single Lumen 07/13/18 1902 Right Antecubital 2 days              Significant Labs:    CBC/Anemia Profile:    Recent Labs  Lab 07/16/18  0344 07/16/18  0730 07/16/18  1200   WBC 8.85 8.74 8.32   HGB 7.1* 7.7* 7.9*   HCT 21.4* 23.6* 23.4*   PLT 45* 52* 46*   MCV 91 93 92   RDW 14.5 14.2 14.2        Chemistries:    Recent Labs  Lab 07/14/18  1912 07/15/18  0414  07/15/18  1601 07/16/18  0344 07/16/18  0730    141  < > 142 141 140   K 4.8 4.3  < > 4.1 5.3* 4.1   * 111*  < > 113* 112* 113*   CO2 20* 19*  < > 21* 20* 20*   BUN 37* 35*  < > 28* 26* 25*   CREATININE 1.8* 1.6*  < > 1.3 1.2 1.1   CALCIUM 8.7 8.3*  < > 8.3* 8.3* 8.0*   ALBUMIN 2.9* 2.6*  < > 2.7* 2.4* 2.5*   PROT 6.0 5.5*  < > 5.9* 6.1 5.8*   BILITOT 1.8* 2.0*  < > 2.8* 2.2* 2.2*   ALKPHOS 77 78  < > 88 88 86   * 186*  < > 174* 142* 126*   * 215*  < > 178* 133* 102*   MG 2.0 1.8  --   --  3.1*  --    PHOS 4.3 3.4  --   --  3.5  --    < > = values in this interval not displayed.    Recent Lab Results        07/16/18  1200 07/16/18  0730 07/16/18  0344 07/15/18  2348 07/15/18  1927      Immature Granulocytes 1.0(H) 1.1(H) 1.1(H) 1.3(H) 1.4(H)     Immature Grans (Abs) 0.08  Comment:  Mild elevation in immature granulocytes is non specific and   can be seen in a variety of conditions including stress response,   acute inflammation, trauma and pregnancy. Correlation with other   laboratory and clinical findings is essential.  (H) 0.10  Comment:  Mild elevation in immature granulocytes is non specific and   can be seen in a variety of conditions including stress response,   acute inflammation, trauma and pregnancy. Correlation with other   laboratory and clinical findings is essential.  (H) 0.10  Comment:  Mild elevation in immature granulocytes is non specific and   can be seen in a variety of conditions including stress response,   acute inflammation, trauma and pregnancy. Correlation with other   laboratory and clinical findings is essential.  (H) 0.13  Comment:  Mild elevation in immature granulocytes is non specific and   can be seen in a variety of conditions including stress response,   acute inflammation, trauma and pregnancy. Correlation with other   laboratory and clinical findings is essential.  (H) 0.15  Comment:  Mild elevation in immature granulocytes is non specific and   can be seen in a variety of conditions including stress response,   acute inflammation, trauma and pregnancy. Correlation with other   laboratory and clinical findings is essential.  (H)     Albumin  2.5(L) 2.4(L)       Alkaline Phosphatase  86 88       ALT  126(H) 142(H)       Anion Gap  7(L) 9       aPTT   35.7  Comment:  aPTT therapeutic range = 39-69 seconds(H) 38.4  Comment:  aPTT therapeutic range = 39-69 seconds(H) 34.9  Comment:  aPTT therapeutic range = 39-69 seconds(H)     AST  102(H) 133  Comment:  *Result may be interfered by visible hemolysis(H)       Baso # 0.00 0.00 0.00 0.00 0.00     Basophil% 0.0 0.0 0.0 0.0 0.0     Total  Bilirubin  2.2  Comment:  For infants and newborns, interpretation of results should be based  on gestational age, weight and in agreement with clinical  observations.  Premature Infant recommended reference ranges:  Up to 24 hours.............<8.0 mg/dL  Up to 48 hours............<12.0 mg/dL  3-5 days..................<15.0 mg/dL  6-29 days.................<15.0 mg/dL  (H) 2.2  Comment:  For infants and newborns, interpretation of results should be based  on gestational age, weight and in agreement with clinical  observations.  Premature Infant recommended reference ranges:  Up to 24 hours.............<8.0 mg/dL  Up to 48 hours............<12.0 mg/dL  3-5 days..................<15.0 mg/dL  6-29 days.................<15.0 mg/dL  (H)       BUN, Bld  25(H) 26(H)       Calcium  8.0(L) 8.3(L)       Chloride  113(H) 112(H)       CO2  20(L) 20(L)       Creatinine  1.1 1.2       Differential Method Automated Automated Automated Automated Automated     eGFR if   >60.0 56.4(A)       eGFR if non   54.3  Comment:  Calculation used to obtain the estimated glomerular filtration  rate (eGFR) is the CKD-EPI equation.   (A) 48.9  Comment:  Calculation used to obtain the estimated glomerular filtration  rate (eGFR) is the CKD-EPI equation.   (A)       Eos # 0.0 0.0 0.0 0.0 0.0     Eosinophil% 0.0 0.0 0.0 0.0 0.1     Glucose  117(H) 122(H)       Gran # (ANC) 6.8 7.3 7.3 8.6(H) 8.9(H)     Gran% 81.4(H) 83.3(H) 82.0(H) 84.2(H) 81.2(H)     Hematocrit 23.4(L) 23.6(L) 21.4(L) 22.0(L) 23.2(L)     Hemoglobin 7.9(L) 7.7(L) 7.1(L) 7.3(L) 7.9(L)     Coumadin Monitoring INR   1.1  Comment:  Coumadin Therapy:  2.0 - 3.0 for INR for all indicators except mechanical heart valves  and antiphospholipid syndromes which should use 2.5 - 3.5.   1.1  Comment:  Coumadin Therapy:  2.0 - 3.0 for INR for all indicators except mechanical heart valves  and antiphospholipid syndromes which should use 2.5 - 3.5.    1.1  Comment:  Coumadin Therapy:  2.0 - 3.0 for INR for all indicators except mechanical heart valves  and antiphospholipid syndromes which should use 2.5 - 3.5.       Lymph # 0.6(L) 0.6(L) 0.7(L) 0.7(L) 0.8(L)     Lymph% 7.0(L) 6.3(L) 7.3(L) 6.4(L) 7.6(L)     Magnesium   3.1(H)       MCH 31.0 30.3 30.2 30.7 30.7     MCHC 33.8 32.6 33.2 33.2 34.1     MCV 92 93 91 92 90     Mono # 0.9 0.8 0.9 0.8 1.1(H)     Mono% 10.6 9.3 9.6 8.1 9.7     MPV 10.4 10.3 10.1 10.0 10.3     nRBC 0 0 0 0 0     Pathologist Review          Pathologist Review Peripheral Smear          Phosphorus   3.5       Platelets 46(L) 52(L) 45(L) 42(L) 48(L)     Potassium  4.1 5.3(H)       Total Protein  5.8(L) 6.1  Comment:  *Result may be interfered by visible hemolysis       Protime   11.4 11.4 11.6     RBC 2.55(L) 2.54(L) 2.35(L) 2.38(L) 2.57(L)     RDW 14.2 14.2 14.5 14.3 14.5     Sodium  140 141       Troponin I          WBC 8.32 8.74 8.85 10.19 10.99                 07/15/18  1601      Immature Granulocytes 1.1(H)     Immature Grans (Abs) 0.13  Comment:  Mild elevation in immature granulocytes is non specific and   can be seen in a variety of conditions including stress response,   acute inflammation, trauma and pregnancy. Correlation with other   laboratory and clinical findings is essential.  (H)     Albumin 2.7(L)     Alkaline Phosphatase 88     (H)     Anion Gap 8     aPTT 36.8  Comment:  aPTT therapeutic range = 39-69 seconds(H)     (H)     Baso # 0.01     Basophil% 0.1     Total Bilirubin 2.8  Comment:  For infants and newborns, interpretation of results should be based  on gestational age, weight and in agreement with clinical  observations.  Premature Infant recommended reference ranges:  Up to 24 hours.............<8.0 mg/dL  Up to 48 hours............<12.0 mg/dL  3-5 days..................<15.0 mg/dL  6-29 days.................<15.0 mg/dL  (H)     BUN, Bld 28(H)     Calcium 8.3(L)     Chloride 113(H)     CO2 21(L)      Creatinine 1.3     Differential Method Automated     eGFR if  51.2(A)     eGFR if non  44.4  Comment:  Calculation used to obtain the estimated glomerular filtration  rate (eGFR) is the CKD-EPI equation.   (A)     Eos # 0.0     Eosinophil% 0.0     Glucose 107     Gran # (ANC) 9.9(H)     Gran% 80.2(H)     Hematocrit 24.1(L)     Hemoglobin 7.9(L)     Coumadin Monitoring INR 1.1  Comment:  Coumadin Therapy:  2.0 - 3.0 for INR for all indicators except mechanical heart valves  and antiphospholipid syndromes which should use 2.5 - 3.5.       Lymph # 1.0     Lymph% 8.3(L)     Magnesium      MCH 29.9     MCHC 32.8     MCV 91     Mono # 1.3(H)     Mono% 10.3     MPV 9.5     nRBC 0     Pathologist Review Review completed     Pathologist Review Peripheral Smear REVIEWED  Comment:  Electronically reviewed and signed by:  Pat George M.D.  Signed on 07/16/18 at 10:20  PATHOLOGIST INTERPRETATION  RBC- Normocytic anemia without significant morphologic abnormalities.   WBC- Granulocytic predominance without significant morphologic   abnormalities.  There is no dysplasia or circulating blasts.  PLT- Thrombocytopenia, normal platelet morphology and no clumping.  Interpreted by Pat George M.D.       Phosphorus      Platelets 40(L)     Potassium 4.1     Total Protein 5.9(L)     Protime 11.7     RBC 2.64(L)     RDW 14.5     Sodium 142     Troponin I 0.032  Comment:  The reference interval for Troponin I represents the 99th percentile   cutoff   for our facility and is consistent with 3rd generation assay   performance.  (H)     WBC 12.38         All pertinent labs within the past 24 hours have been reviewed.    Significant Imaging:  I have reviewed all pertinent imaging results/findings within the past 24 hours.

## 2018-07-16 NOTE — SUBJECTIVE & OBJECTIVE
Interval History:     Underwent CTA yesterday evening showing no active bleeding  Hemoglobin slowly drifting but overall stable  Receiving platelets this AM  Clinically appears improved  Patients pain improved      Review of Systems    Objective:     Temp:  [98 °F (36.7 °C)-99.3 °F (37.4 °C)] 98 °F (36.7 °C)  Pulse:  [] 86  Resp:  [18-36] 18  SpO2:  [94 %-100 %] 97 %  BP: (111-147)/(59-82) 120/62     Body mass index is 34.86 kg/m².       Bladder Scan Volume (mL): 200 mL (07/13/18 2115)    Drains     Drain                 Urethral Catheter 07/13/18 2230 Latex 1 day                Physical Exam   Constitutional: No distress.   HENT:   Head: Normocephalic and atraumatic.   Eyes: Conjunctivae are normal. No scleral icterus.   Neck: Normal range of motion.   Cardiovascular: Normal rate.    Pulmonary/Chest: Effort normal. No respiratory distress.   Abdominal: Soft. She exhibits no distension. There is tenderness. There is no rebound and no guarding.   Mild right upper and lower quadrant tenderness  No left sided tenderness  Mild CVA tenderness  Abdomen soft  Cholecystectomy and hernia repair scars well healed  No bruising on flank   Musculoskeletal: Normal range of motion. She exhibits edema (trace BLE edma).   Neurological: She is alert.   Skin: Skin is warm and dry. She is not diaphoretic.     Psychiatric: She has a normal mood and affect.       Significant Labs:    BMP:    Recent Labs  Lab 07/15/18  0737 07/15/18  1601 07/16/18  0344    142 141   K 4.5 4.1 5.3*   * 113* 112*   CO2 19* 21* 20*   BUN 33* 28* 26*   CREATININE 1.5* 1.3 1.2   CALCIUM 8.3* 8.3* 8.3*       CBC:     Recent Labs  Lab 07/15/18  1927 07/15/18  2348 07/16/18  0344   WBC 10.99 10.19 8.85   HGB 7.9* 7.3* 7.1*   HCT 23.2* 22.0* 21.4*   PLT 48* 42* 45*       All pertinent labs results from the past 24 hours have been reviewed.    Significant Imaging:  All pertinent imaging results/findings from the past 24 hours have been  reviewed.

## 2018-07-16 NOTE — PLAN OF CARE
Problem: Physical Therapy Goal  Goal: Physical Therapy Goal  Outcome: Ongoing (interventions implemented as appropriate)  Eval completed.  Sangita Melendez, PT  7/16/2018

## 2018-07-16 NOTE — ASSESSMENT & PLAN NOTE
rising LFTs - CBD at 7mm but no intrahepatic dilatation, will consider need for GI consult  - LFT's trending downward

## 2018-07-16 NOTE — ASSESSMENT & PLAN NOTE
- On Coumadin at home (held for now) - Consider IVC Filter in the interim  - INR corrected (5 on admission - currently 1.1)   - F/U - DRVVT, Cardiolipin ab, Phosphatidylserine ab, Bete-2 glycoprotein ab   - Haptoglobin - 199  - Fibrinogen - 518  - D-dimer quant - 1.81  - F/U Bilateral LE ultrasound

## 2018-07-17 LAB
ALBUMIN SERPL BCP-MCNC: 2.4 G/DL
ALBUMIN SERPL BCP-MCNC: 2.6 G/DL
ALP SERPL-CCNC: 107 U/L
ALP SERPL-CCNC: 97 U/L
ALT SERPL W/O P-5'-P-CCNC: 107 U/L
ALT SERPL W/O P-5'-P-CCNC: 92 U/L
ANION GAP SERPL CALC-SCNC: 7 MMOL/L
ANION GAP SERPL CALC-SCNC: 9 MMOL/L
APTT BLDCRRT: 33.3 SEC
AST SERPL-CCNC: 53 U/L
AST SERPL-CCNC: 66 U/L
B2 GLYCOPROT1 IGA SER QL: >150 SAU
B2 GLYCOPROT1 IGG SER QL: >150 SGU
B2 GLYCOPROT1 IGM SER QL: <9 SMU
BASOPHILS # BLD AUTO: 0.01 K/UL
BASOPHILS NFR BLD: 0.1 %
BILIRUB SERPL-MCNC: 2.4 MG/DL
BILIRUB SERPL-MCNC: 2.4 MG/DL
BUN SERPL-MCNC: 21 MG/DL
BUN SERPL-MCNC: 26 MG/DL
CALCIUM SERPL-MCNC: 8.6 MG/DL
CALCIUM SERPL-MCNC: 8.8 MG/DL
CARDIOLIPIN IGG SER IA-ACNC: >150 GPL
CARDIOLIPIN IGM SER IA-ACNC: <9.4 MPL
CHLORIDE SERPL-SCNC: 110 MMOL/L
CHLORIDE SERPL-SCNC: 112 MMOL/L
CO2 SERPL-SCNC: 20 MMOL/L
CO2 SERPL-SCNC: 24 MMOL/L
CREAT SERPL-MCNC: 1 MG/DL
CREAT SERPL-MCNC: 1.2 MG/DL
DIFFERENTIAL METHOD: ABNORMAL
EOSINOPHIL # BLD AUTO: 0 K/UL
EOSINOPHIL # BLD AUTO: 0.1 K/UL
EOSINOPHIL NFR BLD: 0.6 %
EOSINOPHIL NFR BLD: 0.7 %
EOSINOPHIL NFR BLD: 0.9 %
EOSINOPHIL NFR BLD: 0.9 %
ERYTHROCYTE [DISTWIDTH] IN BLOOD BY AUTOMATED COUNT: 14.2 %
ERYTHROCYTE [DISTWIDTH] IN BLOOD BY AUTOMATED COUNT: 14.4 %
ERYTHROCYTE [DISTWIDTH] IN BLOOD BY AUTOMATED COUNT: 14.4 %
ERYTHROCYTE [DISTWIDTH] IN BLOOD BY AUTOMATED COUNT: 15.6 %
EST. GFR  (AFRICAN AMERICAN): 56.4 ML/MIN/1.73 M^2
EST. GFR  (AFRICAN AMERICAN): >60 ML/MIN/1.73 M^2
EST. GFR  (NON AFRICAN AMERICAN): 48.9 ML/MIN/1.73 M^2
EST. GFR  (NON AFRICAN AMERICAN): >60 ML/MIN/1.73 M^2
GLUCOSE SERPL-MCNC: 94 MG/DL
GLUCOSE SERPL-MCNC: 98 MG/DL
HCT VFR BLD AUTO: 24.1 %
HCT VFR BLD AUTO: 25.2 %
HCT VFR BLD AUTO: 25.6 %
HCT VFR BLD AUTO: 26 %
HGB BLD-MCNC: 7.8 G/DL
HGB BLD-MCNC: 8 G/DL
HGB BLD-MCNC: 8.2 G/DL
HGB BLD-MCNC: 8.7 G/DL
IMM GRANULOCYTES # BLD AUTO: 0.04 K/UL
IMM GRANULOCYTES # BLD AUTO: 0.05 K/UL
IMM GRANULOCYTES # BLD AUTO: 0.05 K/UL
IMM GRANULOCYTES # BLD AUTO: 0.06 K/UL
IMM GRANULOCYTES NFR BLD AUTO: 0.6 %
IMM GRANULOCYTES NFR BLD AUTO: 0.7 %
IMM GRANULOCYTES NFR BLD AUTO: 0.7 %
IMM GRANULOCYTES NFR BLD AUTO: 0.9 %
INR PPP: 1.1
LYMPHOCYTES # BLD AUTO: 0.7 K/UL
LYMPHOCYTES # BLD AUTO: 0.9 K/UL
LYMPHOCYTES NFR BLD: 10.3 %
LYMPHOCYTES NFR BLD: 12.1 %
LYMPHOCYTES NFR BLD: 9.7 %
LYMPHOCYTES NFR BLD: 9.9 %
MAGNESIUM SERPL-MCNC: 2.5 MG/DL
MCH RBC QN AUTO: 29.7 PG
MCH RBC QN AUTO: 30 PG
MCH RBC QN AUTO: 30.6 PG
MCH RBC QN AUTO: 31.2 PG
MCHC RBC AUTO-ENTMCNC: 31.7 G/DL
MCHC RBC AUTO-ENTMCNC: 32 G/DL
MCHC RBC AUTO-ENTMCNC: 32.4 G/DL
MCHC RBC AUTO-ENTMCNC: 33.5 G/DL
MCV RBC AUTO: 93 FL
MCV RBC AUTO: 94 FL
MCV RBC AUTO: 94 FL
MCV RBC AUTO: 95 FL
MONOCYTES # BLD AUTO: 0.9 K/UL
MONOCYTES # BLD AUTO: 0.9 K/UL
MONOCYTES # BLD AUTO: 1 K/UL
MONOCYTES # BLD AUTO: 1 K/UL
MONOCYTES NFR BLD: 12.8 %
MONOCYTES NFR BLD: 13 %
MONOCYTES NFR BLD: 14.5 %
MONOCYTES NFR BLD: 14.7 %
NEUTROPHILS # BLD AUTO: 5.2 K/UL
NEUTROPHILS # BLD AUTO: 5.2 K/UL
NEUTROPHILS # BLD AUTO: 5.3 K/UL
NEUTROPHILS # BLD AUTO: 5.3 K/UL
NEUTROPHILS NFR BLD: 73.3 %
NEUTROPHILS NFR BLD: 73.7 %
NEUTROPHILS NFR BLD: 74 %
NEUTROPHILS NFR BLD: 75.6 %
NRBC BLD-RTO: 0 /100 WBC
PHOSPHATE SERPL-MCNC: 2.7 MG/DL
PLATELET # BLD AUTO: 62 K/UL
PLATELET # BLD AUTO: 63 K/UL
PLATELET # BLD AUTO: 64 K/UL
PLATELET # BLD AUTO: 66 K/UL
PMV BLD AUTO: 10.5 FL
PMV BLD AUTO: 10.6 FL
PMV BLD AUTO: 10.8 FL
PMV BLD AUTO: 11 FL
POTASSIUM SERPL-SCNC: 3.8 MMOL/L
POTASSIUM SERPL-SCNC: 3.9 MMOL/L
PROT SERPL-MCNC: 5.9 G/DL
PROT SERPL-MCNC: 6.1 G/DL
PROTHROMBIN TIME: 11.1 SEC
RBC # BLD AUTO: 2.55 M/UL
RBC # BLD AUTO: 2.69 M/UL
RBC # BLD AUTO: 2.73 M/UL
RBC # BLD AUTO: 2.79 M/UL
SODIUM SERPL-SCNC: 141 MMOL/L
SODIUM SERPL-SCNC: 141 MMOL/L
WBC # BLD AUTO: 6.98 K/UL
WBC # BLD AUTO: 7.02 K/UL
WBC # BLD AUTO: 7.02 K/UL
WBC # BLD AUTO: 7.12 K/UL

## 2018-07-17 PROCEDURE — 97116 GAIT TRAINING THERAPY: CPT

## 2018-07-17 PROCEDURE — 25000003 PHARM REV CODE 250: Performed by: STUDENT IN AN ORGANIZED HEALTH CARE EDUCATION/TRAINING PROGRAM

## 2018-07-17 PROCEDURE — 36415 COLL VENOUS BLD VENIPUNCTURE: CPT

## 2018-07-17 PROCEDURE — 76937 US GUIDE VASCULAR ACCESS: CPT

## 2018-07-17 PROCEDURE — 99233 SBSQ HOSP IP/OBS HIGH 50: CPT | Mod: ,,, | Performed by: INTERNAL MEDICINE

## 2018-07-17 PROCEDURE — 85025 COMPLETE CBC W/AUTO DIFF WBC: CPT | Mod: 91

## 2018-07-17 PROCEDURE — 85730 THROMBOPLASTIN TIME PARTIAL: CPT

## 2018-07-17 PROCEDURE — 87040 BLOOD CULTURE FOR BACTERIA: CPT

## 2018-07-17 PROCEDURE — 97530 THERAPEUTIC ACTIVITIES: CPT

## 2018-07-17 PROCEDURE — C1751 CATH, INF, PER/CENT/MIDLINE: HCPCS

## 2018-07-17 PROCEDURE — 80053 COMPREHEN METABOLIC PANEL: CPT

## 2018-07-17 PROCEDURE — 97535 SELF CARE MNGMENT TRAINING: CPT

## 2018-07-17 PROCEDURE — 20000000 HC ICU ROOM

## 2018-07-17 PROCEDURE — 99900035 HC TECH TIME PER 15 MIN (STAT)

## 2018-07-17 PROCEDURE — 63600175 PHARM REV CODE 636 W HCPCS: Performed by: NURSE PRACTITIONER

## 2018-07-17 PROCEDURE — 27000221 HC OXYGEN, UP TO 24 HOURS

## 2018-07-17 PROCEDURE — 85610 PROTHROMBIN TIME: CPT

## 2018-07-17 PROCEDURE — 36569 INSJ PICC 5 YR+ W/O IMAGING: CPT

## 2018-07-17 PROCEDURE — 94799 UNLISTED PULMONARY SVC/PX: CPT

## 2018-07-17 PROCEDURE — 94761 N-INVAS EAR/PLS OXIMETRY MLT: CPT

## 2018-07-17 PROCEDURE — 84100 ASSAY OF PHOSPHORUS: CPT

## 2018-07-17 PROCEDURE — 83735 ASSAY OF MAGNESIUM: CPT

## 2018-07-17 RX ORDER — TRAMADOL HYDROCHLORIDE 50 MG/1
50 TABLET ORAL EVERY 6 HOURS PRN
Status: DISCONTINUED | OUTPATIENT
Start: 2018-07-17 | End: 2018-07-21 | Stop reason: HOSPADM

## 2018-07-17 RX ORDER — DIPHENHYDRAMINE HCL 25 MG
50 CAPSULE ORAL ONCE
Status: COMPLETED | OUTPATIENT
Start: 2018-07-18 | End: 2018-07-18

## 2018-07-17 RX ORDER — FENTANYL CITRATE 50 UG/ML
25 INJECTION, SOLUTION INTRAMUSCULAR; INTRAVENOUS EVERY 4 HOURS PRN
Status: DISCONTINUED | OUTPATIENT
Start: 2018-07-17 | End: 2018-07-17

## 2018-07-17 RX ORDER — OXYCODONE HYDROCHLORIDE 5 MG/1
5 TABLET ORAL EVERY 6 HOURS PRN
Status: DISCONTINUED | OUTPATIENT
Start: 2018-07-17 | End: 2018-07-17

## 2018-07-17 RX ORDER — FENTANYL CITRATE 50 UG/ML
25 INJECTION, SOLUTION INTRAMUSCULAR; INTRAVENOUS EVERY 6 HOURS PRN
Status: DISCONTINUED | OUTPATIENT
Start: 2018-07-17 | End: 2018-07-21 | Stop reason: HOSPADM

## 2018-07-17 RX ADMIN — TRAMADOL HYDROCHLORIDE 50 MG: 50 TABLET, COATED ORAL at 11:07

## 2018-07-17 RX ADMIN — ACETAMINOPHEN 650 MG: 325 TABLET, FILM COATED ORAL at 03:07

## 2018-07-17 RX ADMIN — LEVOTHYROXINE SODIUM 150 MCG: 75 TABLET ORAL at 06:07

## 2018-07-17 RX ADMIN — PREDNISONE 50 MG: 20 TABLET ORAL at 07:07

## 2018-07-17 RX ADMIN — TRAMADOL HYDROCHLORIDE 50 MG: 50 TABLET, COATED ORAL at 07:07

## 2018-07-17 RX ADMIN — PANTOPRAZOLE SODIUM 40 MG: 40 TABLET, DELAYED RELEASE ORAL at 08:07

## 2018-07-17 RX ADMIN — ACETAMINOPHEN 650 MG: 325 TABLET, FILM COATED ORAL at 08:07

## 2018-07-17 NOTE — PLAN OF CARE
Problem: Patient Care Overview  Goal: Plan of Care Review  Outcome: Ongoing (interventions implemented as appropriate)  Patient with stable vital signs throughout shift. Pt on 3L NC at 92%. Pt tolerated sitting up in chair for 2 hours well. Pt and family updated on plan of care for the day. Dr. Figueroa updated family on plan of care for tomorrow's IVC filter. Dr. Figueroa updated on VS and lab results. Will continue to monitor pt.

## 2018-07-17 NOTE — PT/OT/SLP PROGRESS
Physical Therapy Treatment    Patient Name:  Emiliana Persaud   MRN:  [id number]     Co-treat with OT    Recommendations:     Discharge Recommendations:  home with home health   Discharge Equipment Recommendations: none   Barriers to discharge: None    Assessment:     Emiliana Persaud is a 62 y.o. female admitted with a medical diagnosis of Perinephric hematoma.  She presents with the following impairments/functional limitations:  impaired balance, impaired endurance, impaired functional mobilty, impaired self care skills, pain, gait instability. Pt progressing towards goals, but not at PLOF. Pt tolerated session well. Pt is improving with therapy evidenced by increased gait distance and decreased assist with bed mobility and sit to stand transfer. Recommend d/c to HH to maximize functional independence.      Rehab Prognosis:  good; patient would benefit from acute skilled PT services to address these deficits and reach maximum level of function.      Recent Surgery: Procedure(s) (LRB):  CTA (N/A) 2 Days Post-Op    Plan:     During this hospitalization, patient to be seen 4 x/week to address the above listed problems via gait training, therapeutic activities, therapeutic exercises, neuromuscular re-education  · Plan of Care Expires:  08/13/18   Plan of Care Reviewed with: patient, daughter, spouse    Subjective     Communicated with RN prior to session and daughter and  present in room.  Patient found in bed upon PT entry to room, agreeable to treatment.      Chief Complaint: pain  Patient comments/goals: to get better and return home   Pain/Comfort:  · Pain Rating 1: 4/10 (abdomen)  · Pain Addressed 1: Distraction, Reposition  · Pain Rating Post-Intervention 1: 4/10    Patients cultural, spiritual, [employer]ism conflicts given the current situation: none reported     Objective:     Patient found with: telemetry, pulse ox (continuous), blood pressure cuff, peripheral IV, chew catheter     General Precautions:  Standard, fall   Orthopedic Precautions:N/A   Braces: N/A     Functional Mobility:  · Bed Mobility:     · Rolling Right: stand by assistance  · Scooting: stand by assistance  · Supine to Sit: stand by assistance  · Transfers:     · Sit to Stand:  contact guard assistance with no AD x 2 trials (EOB, toilet)   · Gait:   · ~10ft with min A to toilet   · Sitting on toilet to have BM; pt and daughter assisted with sponge bath of pt and with toileting hygiene   · ~35ft with min A within room  · Decreased maddie, decreased step length, narrow FRANK  · Quick to fatigue       AM-PAC 6 CLICK MOBILITY  Turning over in bed (including adjusting bedclothes, sheets and blankets)?: 4  Sitting down on and standing up from a chair with arms (e.g., wheelchair, bedside commode, etc.): 3  Moving from lying on back to sitting on the side of the bed?: 4  Moving to and from a bed to a chair (including a wheelchair)?: 3  Need to walk in hospital room?: 3  Climbing 3-5 steps with a railing?: 3  Basic Mobility Total Score: 20       Therapeutic Activities and Exercises:  Educated pt on PT POC  Educated pt on importance of OOB activity  Pt verbalized understanding     T/f to chair to increase tolerance to OOB activity and to create optimal positioning for lung expansion     Toileting, hygiene, and dressing with OT while sitting on toilet      Patient left up in chair with all lines intact, call button in reach, RN notified and family present..    GOALS:    Physical Therapy Goals        Problem: Physical Therapy Goal    Goal Priority Disciplines Outcome Goal Variances Interventions   Physical Therapy Goal     PT/OT, PT Ongoing (interventions implemented as appropriate)     Description:  Goals to be met by:      Patient will increase functional independence with mobility by performin. Supine to sit with Stand-by Assistance - met   2. Sit to supine with Stand-by Assistance - not met  3. Sit to stand transfer with Stand-by Assistance  - not met  4. Gait  x 100 feet with Stand-by Assistance - not met                    Time Tracking:     PT Received On: 07/17/18  PT Start Time: 0859     PT Stop Time: 0923  PT Total Time (min): 24 min     Billable Minutes: Gait Training 8 and Therapeutic Activity 15    Treatment Type: Treatment  PT/PTA: PT           Wandy Earl PT, DPT  7/17/2018  [phone]

## 2018-07-17 NOTE — PROGRESS NOTES
[facility]  Urology  Progress Note    Patient Name: Emiliana Persaud  MRN: [id number]  Admission Date: 7/13/2018  Hospital Length of Stay: 4 days  Code Status: Full Code   Attending Provider:  Allyssa Macias MD  Primary Care Physician: [facility]    Subjective:     HPI:  63yo F with history of antiphospholipid syndrome on warfarin who was brought to the ED as a transfer from [facility] for perinephric hematoma. Per her family, she woke in the middle of the night with severe right flank and abdominal pain. In the ED she was found to have a perinephric hematoma and an INR of 2.5. She was transferred to [facility] ED for further evaluation. When she arrived to [facility] ED she had a Hgb of 11 and an INR of 5.0. Her vital signs were stable. She was currently receiving a 2nd unit of PRBCs in the ED.     She has no urologic surgical history. She has had a partial thyroidectomy, cholecystectomy with incisional hernia (s/p mesh repair) and radiation for an auditory nerve tumor. Her main complaints are fatigue and abdominal and flank pain.     Interval History:     JUDITH  Feels well  Received 1 u PRBC and 4 u platelets yesterday, difficult to interpret response due to frequent CBCs, but hemoglobin higher this AM without further PRBC transfusion   No longer tachycardic  Tachypnea improving   Patient now complaining of diarrhea       Review of Systems  Objective:     Temp:  [98.3 °F (36.8 °C)-98.7 °F (37.1 °C)] 98.3 °F (36.8 °C)  Pulse:  [] 78  Resp:  [18-28] 21  SpO2:  [96 %-100 %] 98 %  BP: (106-152)/(57-76) 131/61     Body mass index is 34.86 kg/m².       Bladder Scan Volume (mL): 200 mL (07/13/18 2115)    Drains     Drain                 Urethral Catheter 07/13/18 2230 Latex 3 days                Physical Exam   Constitutional: No distress.   HENT:   Head: Normocephalic and atraumatic.   Eyes: Conjunctivae are normal. No scleral icterus.   Neck: Normal range of motion.   Cardiovascular: Normal  rate.    Pulmonary/Chest: Effort normal. No respiratory distress.   Abdominal: Soft. She exhibits no distension. There is tenderness. There is no rebound and no guarding.   Mild right upper and lower quadrant tenderness  No left sided tenderness  right flank tenderness, no flank ecchymosis  Abdomen soft  Cholecystectomy and hernia repair scars well healed   Musculoskeletal: Normal range of motion. She exhibits edema.   Neurological: She is alert.   Skin: Skin is warm and dry. She is not diaphoretic.     Psychiatric: She has a normal mood and affect.       Significant Labs:    BMP:    Recent Labs  Lab 07/16/18  0730 07/16/18  1615 07/17/18  0459    144 141   K 4.1 3.3* 3.9   * 116* 112*   CO2 20* 19* 20*   BUN 25* 25* 26*   CREATININE 1.1 1.1 1.2   CALCIUM 8.0* 7.6* 8.8       CBC:     Recent Labs  Lab 07/16/18  2002 07/16/18  2358 07/17/18  0440   WBC 7.92 7.12 7.02   HGB 7.8* 7.8* 8.2*   HCT 23.6* 24.1* 25.6*   PLT 66* 66* 63*       All pertinent labs results from the past 24 hours have been reviewed.    Significant Imaging:  All pertinent imaging results/findings from the past 24 hours have been reviewed.                  Assessment/Plan:     * Perinephric hematoma    - Patient is hemodynamically stable s/p 7 units PRBCs, 2FFP, 4 Platelets  - hemoglobin appears to be stabilizing--recommend spacing out CBCs to q 8 hours   - maintain INR at normal level   - continue PBRC, FFP, platelet transfusions as needed   - continue bed rest   - recommend incentive spirometry-- wean oxygen as able  - Recommend removing chew catheter-- may void into bed pan   - management of diarrhea per primary   - no surgical intervention at this time-- okay for regular diet   - no further imaging needed at this time, negative CTA 7/15  - please call with questions or concerns.     If patient remains stable and hemoglobin stabilizes, may be appropriate for stepdown tomorrow. May begin ambulating tomorrow if hemoglobin stable.           VTE Risk Mitigation         Ordered     Place MARYJO hose  Until discontinued      07/13/18 1500     IP VTE LOW RISK PATIENT  Once      07/13/18 1500          Giovani Skaggs MD  Urology  [facility]

## 2018-07-17 NOTE — CONSULTS
Radiology Consult    Emiliana Persaud is a 62 y.o. female with a history of chronic anticoagulation for antiphospholipid syndrome and multiple prior DVT's who has been admitted to the hospital for a perinephric hematoma. Critical Care team is planning to discontinue anticoagulation. IR consulted for IVC filter placement.     Past Medical History:   Diagnosis Date    Acute DVT (deep venous thrombosis) 7/16/2018    Antiphospholipid antibody syndrome     Brain aneurysm     Cancer     GERD (gastroesophageal reflux disease)     Migraine headache     Thyroid disease      Past Surgical History:   Procedure Laterality Date    CHOLECYSTECTOMY      COMPUTED TOMOGRAPHY N/A 7/15/2018    Procedure: CTA;  Surgeon: Shellie Surgeon;  Location: [facility];  Service: Anesthesiology;  Laterality: N/A;    HERNIA REPAIR      KNEE SURGERY      THYROID SURGERY         Discussed with primary team. Call back number [phone].    Imaging reviewed with Radiology staff, Dr. Mann.     Procedure: IVC filter placement     Scheduled Meds:    levothyroxine  150 mcg Oral Before breakfast    pantoprazole  40 mg Oral Daily     Continuous Infusions:   PRN Meds:acetaminophen, dextrose 50%, dextrose 50%, fentaNYL, glucagon (human recombinant), glucose, glucose, magnesium sulfate IVPB, magnesium sulfate IVPB, ondansetron, prochlorperazine, ramelteon, sodium phosphate IVPB, sodium phosphate IVPB, sodium phosphate IVPB, traMADol    Allergies:   Review of patient's allergies indicates:   Allergen Reactions    Bactrim [sulfamethoxazole-trimethoprim]     Iodine and iodide containing products      According to pt's son, pt's neck swells up with iodine contrast exposure       Labs:    Recent Labs  Lab 07/17/18  0440   INR 1.1       Recent Labs  Lab 07/17/18  0440   WBC 7.02   HGB 8.2*   HCT 25.6*   MCV 94   PLT 63*      Recent Labs  Lab 07/17/18  0440 07/17/18  0459   GLU  --  94   NA  --  141   K  --  3.9   CL  --  112*   CO2  --  20*   BUN  --  26*    CREATININE  --  1.2   CALCIUM  --  8.8   MG 2.5  --    ALT  --  107*   AST  --  66*   ALBUMIN  --  2.6*   BILITOT  --  2.4*         Vitals (Most Recent):  Temp: 98.9 °F (37.2 °C) (07/17/18 0700)  Pulse: 85 (07/17/18 0900)  Resp: (!) 22 (07/17/18 0900)  BP: 128/67 (07/17/18 0900)  SpO2: 98 % (07/17/18 0900)    Plan:   1. NPO after midnight.  2. 13 hour steroid prep for contrast allergy.   3. IVC filter placement scheduled for tomorrow, 07/18/2018. Tentatively scheduled as a morning case, 8AM.     Sajan Ortiz MD  Department of Radiology   PGY III  [phone]

## 2018-07-17 NOTE — ASSESSMENT & PLAN NOTE
- 7/14: Perinephric hematoma measuring [id number] with evidence of mass effect on right kidney as well as free fluid noted in pelvis on CT scan (unknown how this compares to outside CT as no records/CT scan were available to MICU team - Urology has previous CT in there possession and are following patient)   - 7/15: Repeat CTA with grossly stable size of hematoma  - S/P transfusion 5 units pRBCs (last transfused 7/15)  - Consulted urology, appreciate recommendations    - follow CBC q6h  - likely step down to floor tomorrow (will monitor for 24 more hours to ensure stability)  - hold anticoagulation and consider IVC

## 2018-07-17 NOTE — ASSESSMENT & PLAN NOTE
- On Coumadin at home (held for now) - Consider IVC Filter in the interim  - INR corrected (5 on admission - currently 1.1)   - F/U - DRVVT, Cardiolipin ab, Phosphatidylserine ab  - Beta 2 microglobulin positive  - Haptoglobin - 199  - Fibrinogen - 518  - D-dimer quant - 1.81  - Bilateral LE ultrasound negative for DVT    Plan to consult IR vs Vascular surgery for consideration of IVC filter

## 2018-07-17 NOTE — SUBJECTIVE & OBJECTIVE
Interval History/Significant Events: Patient complaining of right flank pain and fullness, but states that it is possibly slightly improved. No fevers, no n/v. Patient has had 3 episodes of loose stools but reports that this is normal for her with her history of IBS-D.     Review of Systems   Constitutional: Negative for chills and fever.   Respiratory: Negative for cough and shortness of breath.    Gastrointestinal: Positive for abdominal pain and diarrhea. Negative for nausea and vomiting.   Genitourinary: Negative for dysuria and hematuria.   Neurological: Negative for light-headedness and headaches.     Objective:     Vital Signs (Most Recent):  Temp: 98.9 °F (37.2 °C) (07/17/18 0700)  Pulse: 82 (07/17/18 0700)  Resp: (!) 21 (07/17/18 0700)  BP: 139/66 (07/17/18 0700)  SpO2: 99 % (07/17/18 0700) Vital Signs (24h Range):  Temp:  [98.3 °F (36.8 °C)-98.9 °F (37.2 °C)] 98.9 °F (37.2 °C)  Pulse:  [] 82  Resp:  [18-28] 21  SpO2:  [96 %-100 %] 99 %  BP: (106-152)/(57-76) 139/66   Weight: 104 kg (229 lb 4.5 oz)  Body mass index is 34.86 kg/m².      Intake/Output Summary (Last 24 hours) at 07/17/18 0802  Last data filed at 07/17/18 0700   Gross per 24 hour   Intake                0 ml   Output              870 ml   Net             -870 ml       Physical Exam   Constitutional: She appears well-developed and well-nourished. No distress.   HENT:   Head: Normocephalic and atraumatic.   Eyes: Conjunctivae are normal. Pupils are equal, round, and reactive to light.   Neck: Normal range of motion. No thyromegaly present.   Cardiovascular: Normal rate, regular rhythm and normal heart sounds.  Exam reveals no gallop and no friction rub.    No murmur heard.  Pulmonary/Chest: Effort normal and breath sounds normal.   Abdominal: Soft. She exhibits no distension. There is no tenderness.   Mild tenderness right lower quadrant. No visible bruising anteriorly   Musculoskeletal: Normal range of motion. She exhibits no deformity.    Neurological: No cranial nerve deficit.   Skin: Skin is warm and dry. Capillary refill takes less than 2 seconds. She is not diaphoretic.   Psychiatric: She has a normal mood and affect.   Nursing note and vitals reviewed.      Vents:  Oxygen Concentration (%): 28 (07/13/18 2329)  Lines/Drains/Airways     Drain                 Urethral Catheter 07/13/18 2230 Latex 3 days          Peripheral Intravenous Line                 Peripheral IV - Single Lumen 07/13/18 1353 Right Forearm 3 days         Peripheral IV - Single Lumen 07/13/18 1831 Right Hand 3 days         Peripheral IV - Single Lumen 07/13/18 1902 Right Antecubital 3 days              Significant Labs:    CBC/Anemia Profile:    Recent Labs  Lab 07/16/18  2002 07/16/18  2358 07/17/18  0440   WBC 7.92 7.12 7.02   HGB 7.8* 7.8* 8.2*   HCT 23.6* 24.1* 25.6*   PLT 66* 66* 63*   MCV 93 95 94   RDW 14.5 15.6* 14.4        Chemistries:    Recent Labs  Lab 07/16/18  0344 07/16/18  0730 07/16/18  1615 07/17/18  0440 07/17/18  0459    140 144  --  141   K 5.3* 4.1 3.3*  --  3.9   * 113* 116*  --  112*   CO2 20* 20* 19*  --  20*   BUN 26* 25* 25*  --  26*   CREATININE 1.2 1.1 1.1  --  1.2   CALCIUM 8.3* 8.0* 7.6*  --  8.8   ALBUMIN 2.4* 2.5* 2.3*  --  2.6*   PROT 6.1 5.8* 5.5*  --  6.1   BILITOT 2.2* 2.2* 2.3*  --  2.4*   ALKPHOS 88 86 85  --  97   * 126* 115*  --  107*   * 102* 80*  --  66*   MG 3.1*  --   --  2.5  --    PHOS 3.5  --   --  2.7  --          Significant Imaging:  I have reviewed all pertinent imaging results/findings within the past 24 hours.

## 2018-07-17 NOTE — CONSULTS
Midline placed in right basilic, size [id number] Lot# [id number] Removal date on or before 8/15/18. Needle advanced into vessel with real time ultrasound guidance. Image recorded and saved.

## 2018-07-17 NOTE — ASSESSMENT & PLAN NOTE
- Patient is hemodynamically stable s/p 7 units PRBCs, 2FFP, 4 Platelets  - hemoglobin appears to be stabilizing--recommend spacing out CBCs to q 8 hours   - maintain INR at normal level   - continue PBRC, FFP, platelet transfusions as needed   - continue bed rest   - recommend incentive spirometry   - Recommend removing chew catheter-- may void into bed pan   - management of diarrhea per primary   - no surgical intervention at this time-- okay for liquid diet   - no further imaging needed at this time, negative CTA 7/15  - please call with questions or concerns.

## 2018-07-17 NOTE — PT/OT/SLP PROGRESS
Occupational Therapy   Treatment    Name: Emiliana Persaud  MRN: [id number]  Admitting Diagnosis:  Perinephric hematoma  2 Days Post-Op    Recommendations:     Discharge Recommendations: home      Subjective     Communicated with: nsg prior to session.  Pain/Comfort:  · Pain Rating 1: 4/10  · Location 1: abdomen  · Pain Addressed 1: Pre-medicate for activity, Reposition, Distraction  · Pain Rating Post-Intervention 1: 4/10    Patients cultural, spiritual, Synagogue conflicts given the current situation: none reported    Objective:     Patient found supine in bed with fly present. Pt with 5 LPM oxygen in place via NC with saturation remaining greater than 96% throughout session. :      General Precautions: Standard, fall       Occupational Performance:    Bed Mobility:    · Patient completed Supine to Sit with stand by assistance     Functional Mobility/Transfers:  · Patient completed Sit <> Stand Transfer with contact guard assistance  with  no assistive device   · Patient completed Toilet Transfer Stand Pivot technique with contact guard assistance with  no AD      Activities of Daily Living:  · Feeding:  independence    · Grooming: supervision    · Bathing: minimum assistance    · Upper Body Dressing: minimum assistance    · Toileting: minimum assistance      Patient left up in chair with all lines intact, call button in reach, nsg notified and fly present    [facility] 6 Click:  [facility] Total Score: 19    Treatment & Education:  Pt completed functional mobility in room with HHA/MIN A. Pt completed extensive ADL's in bathroom with overall MIN A.   Education provided re: safety with functional mobility/ADL skills and OT POC.   Education:    Assessment:     Emiliana Persaud is a 62 y.o. female with a medical diagnosis of Perinephric hematoma.    Performance deficits affecting function are weakness, impaired functional mobilty, gait instability, impaired self care skills, impaired endurance, impaired balance, pain.  Pt tolerated  session well with good effort and performance. Pt is progressing well and OT anticipates pt will be ready for d/c home with family when medically appropriate     Rehab Prognosis:  Good ; patient would benefit from acute skilled OT services to address these deficits and reach maximum level of function.       Plan:     Patient to be seen 4 x/week to address the above listed problems via self-care/home management, therapeutic exercises, therapeutic activities  · Plan of Care Expires: 08/13/18  · Plan of Care Reviewed with: patient, daughter, spouse    This Plan of care has been discussed with the patient who was involved in its development and understands and is in agreement with the identified goals and treatment plan    GOALS:    Occupational Therapy Goals        Problem: Occupational Therapy Goal    Goal Priority Disciplines Outcome Interventions   Occupational Therapy Goal     OT, PT/OT Ongoing (interventions implemented as appropriate)    Description:  Goals to be met by: 7/22/18     Patient will increase functional independence with ADLs by performing:    UE Dressing with Moderate Assistance.  Grooming while seated with Stand-by Assistance.  Toileting from bedside commode with Maximum Assistance for hygiene and clothing management.   Sitting at edge of bed x15 minutes with Stand-by Assistance.  Rolling to Bilateral with Stand-by Assistance.   Supine to sit with Minimal Assistance.  Stand pivot transfers with Moderate Assistance.                      Time Tracking:     OT Date of Treatment: 07/17/18  OT Start Time: 0900  OT Stop Time: 0930  OT Total Time (min): 30 min    Billable Minutes:Self Care/Home Management 30    SALIMA Pool  7/17/2018

## 2018-07-17 NOTE — SUBJECTIVE & OBJECTIVE
Interval History:     JUDITH  Feels well  Received 1 u PRBC and 4 u platelets yesterday, difficult to interpret response due to frequent CBCs, but hemoglobin higher this AM without further PRBC transfusion   No longer tachycardic  Tachypnea improving   Patient now complaining of diarrhea       Review of Systems  Objective:     Temp:  [98.3 °F (36.8 °C)-98.7 °F (37.1 °C)] 98.3 °F (36.8 °C)  Pulse:  [] 78  Resp:  [18-28] 21  SpO2:  [96 %-100 %] 98 %  BP: (106-152)/(57-76) 131/61     Body mass index is 34.86 kg/m².       Bladder Scan Volume (mL): 200 mL (07/13/18 2115)    Drains     Drain                 Urethral Catheter 07/13/18 2230 Latex 3 days                Physical Exam   Constitutional: No distress.   HENT:   Head: Normocephalic and atraumatic.   Eyes: Conjunctivae are normal. No scleral icterus.   Neck: Normal range of motion.   Cardiovascular: Normal rate.    Pulmonary/Chest: Effort normal. No respiratory distress.   Abdominal: Soft. She exhibits no distension. There is tenderness. There is no rebound and no guarding.   Mild right upper and lower quadrant tenderness  No left sided tenderness  right flank tenderness, no flank ecchymosis  Abdomen soft  Cholecystectomy and hernia repair scars well healed   Musculoskeletal: Normal range of motion. She exhibits edema.   Neurological: She is alert.   Skin: Skin is warm and dry. She is not diaphoretic.     Psychiatric: She has a normal mood and affect.       Significant Labs:    BMP:    Recent Labs  Lab 07/16/18  0730 07/16/18  1615 07/17/18  0459    144 141   K 4.1 3.3* 3.9   * 116* 112*   CO2 20* 19* 20*   BUN 25* 25* 26*   CREATININE 1.1 1.1 1.2   CALCIUM 8.0* 7.6* 8.8       CBC:     Recent Labs  Lab 07/16/18 2002 07/16/18  2358 07/17/18  0440   WBC 7.92 7.12 7.02   HGB 7.8* 7.8* 8.2*   HCT 23.6* 24.1* 25.6*   PLT 66* 66* 63*       All pertinent labs results from the past 24 hours have been reviewed.    Significant Imaging:  All pertinent imaging  results/findings from the past 24 hours have been reviewed.

## 2018-07-17 NOTE — ASSESSMENT & PLAN NOTE
-patients Cr increased to 1.4 and then 1.6 (no kidney issues on baseline)  -most likely 2/2 to hypoperfusion  RESOLVED

## 2018-07-17 NOTE — PLAN OF CARE
Problem: Physical Therapy Goal  Goal: Physical Therapy Goal  Goals to be met by:      Patient will increase functional independence with mobility by performin. Supine to sit with Stand-by Assistance - met   2. Sit to supine with Stand-by Assistance - not met  3. Sit to stand transfer with Stand-by Assistance - not met  4. Gait  x 100 feet with Stand-by Assistance - not met  Outcome: Ongoing (interventions implemented as appropriate)  Treatment completed and some goals met. Goals appropriate.

## 2018-07-17 NOTE — PLAN OF CARE
Problem: Patient Care Overview  Goal: Plan of Care Review  Outcome: Ongoing (interventions implemented as appropriate)  Patient on 5L NS, SATs %. All other VSS. Labs monitored closely through shift. Urine output 30mL and higher through shift. Patient reports minimal pain through shift. PRNs for insomnia given. Patient given partial bath for incontinence care. Patient turned Q2, and all questions answered by RN, will continue to monitor through shift.

## 2018-07-17 NOTE — PLAN OF CARE
Problem: Occupational Therapy Goal  Goal: Occupational Therapy Goal  Goals to be met by: 7/22/18     Patient will increase functional independence with ADLs by performing:    UE Dressing with Moderate Assistance.  Grooming while seated with Stand-by Assistance.  Toileting from bedside commode with Maximum Assistance for hygiene and clothing management.   Sitting at edge of bed x15 minutes with Stand-by Assistance.  Rolling to Bilateral with Stand-by Assistance.   Supine to sit with Minimal Assistance.  Stand pivot transfers with Moderate Assistance.     Goals and POC remain appropriate.

## 2018-07-17 NOTE — PROGRESS NOTES
[facility]  Critical Care Medicine  Progress Note    Patient Name: Emiliana Persaud  MRN: [id number]  Admission Date: 7/13/2018  Hospital Length of Stay: 4 days  Code Status: Full Code  Attending Provider: Ayan Figueroa MD  Primary Care Provider: [facility]   Principal Problem: Perinephric hematoma    Subjective:     HPI:   Patient is a 61 yo F with significant past medical history of antiphospholipid syndrome, thyroid cancer, IBS, brain aneurysm, and chronic migraines who presents from [facility] ED in [place], [place] with concerns of a perinephric hematoma.    Patient states that the pain began 7/12 at 11 pm. It progressively got worse. She did not try anything for the pain. The pain has been constant. It radiates to the right abdomen. Patient endorsed hematuria, HA, nausea, thirst, dry mouth. he denies chest pain, SOB. CT at OSH showed perinephric hematoma. Outside labs show INR of 2.6.  OSH labs wre WBC/10.9, H/H 13.1/37.6  One unit of blood was given in route to [facility]. Patient received morphine, dilaudid, and phenergen at outside hospital.     Patient completed 2 blood transfusions in [facility] ED.  At that time, patient was admitted to hospital medicine.  Hg was 10.7 s/p 2 U.  Urology and IR consulted for retroperitoneal bleed.  They stated that there was intervention needed at this time.  In the evening of 7/13, code response team was called due to patient feeling lethargic, weak, and diaphoretic. Team went to see patient. Extremely lethargic but easily aroused.  Oriented X4 with no motor deficits noted.  BS performed revealed a BS of 258.   BP 94/65 RR 12 O2 94% on RA.  Placed on 2L NC.  Labs including POCT glucose, ABG, lactic acid, CMP, CBC, PT/INR were ordered. Blood pressure started dropping: SBP running in the mid 90s and DBP in the mid 40s-low 50s. Bolus of saline and FFP were ordered and administered.     Consulted ICU for hypotension.      Hospital/ICU Course:  07/14 - 63 yo F with PMHx antiphospholipid syndrome, RLE DVT (resolved), right acoustic neuroma, thyroid ca, brain aneurysm admitted from OSH with spontaneous perinephric hematoma as denies trauma or recent surgical intervention. INR 5 on admission, s/p 3u PRBCs total.    - Due to dropping blood pressures (SBP in mid 80s with DBP in mid 50s) admitted to ICU   -since admit to the ICU she has had adequate BP with SBP >120 and MAPs >65  Urology and IR consulted overnight and following.  07/15 - Multiple blood products transfused throughout the day. CTA was unable to appreciate any active bleeding.  07/16 - Bilateral LE Ultrasound negative for DVT. Discussed IVC Filter placement    Interval History/Significant Events: Patient complaining of right flank pain and fullness, but states that it is possibly slightly improved. No fevers, no n/v. Patient has had 3 episodes of loose stools but reports that this is normal for her with her history of IBS-D.     Review of Systems   Constitutional: Negative for chills and fever.   Respiratory: Negative for cough and shortness of breath.    Gastrointestinal: Positive for abdominal pain and diarrhea. Negative for nausea and vomiting.   Genitourinary: Negative for dysuria and hematuria.   Neurological: Negative for light-headedness and headaches.     Objective:     Vital Signs (Most Recent):  Temp: 98.9 °F (37.2 °C) (07/17/18 0700)  Pulse: 82 (07/17/18 0700)  Resp: (!) 21 (07/17/18 0700)  BP: 139/66 (07/17/18 0700)  SpO2: 99 % (07/17/18 0700) Vital Signs (24h Range):  Temp:  [98.3 °F (36.8 °C)-98.9 °F (37.2 °C)] 98.9 °F (37.2 °C)  Pulse:  [] 82  Resp:  [18-28] 21  SpO2:  [96 %-100 %] 99 %  BP: (106-152)/(57-76) 139/66   Weight: 104 kg (229 lb 4.5 oz)  Body mass index is 34.86 kg/m².      Intake/Output Summary (Last 24 hours) at 07/17/18 0802  Last data filed at 07/17/18 0700   Gross per 24 hour   Intake                0 ml   Output              870 ml    Net             -870 ml       Physical Exam   Constitutional: She appears well-developed and well-nourished. No distress.   HENT:   Head: Normocephalic and atraumatic.   Eyes: Conjunctivae are normal. Pupils are equal, round, and reactive to light.   Neck: Normal range of motion. No thyromegaly present.   Cardiovascular: Normal rate, regular rhythm and normal heart sounds.  Exam reveals no gallop and no friction rub.    No murmur heard.  Pulmonary/Chest: Effort normal and breath sounds normal.   Abdominal: Soft. She exhibits no distension. There is no tenderness.   Mild tenderness right lower quadrant. No visible bruising anteriorly   Musculoskeletal: Normal range of motion. She exhibits no deformity.   Neurological: No cranial nerve deficit.   Skin: Skin is warm and dry. Capillary refill takes less than 2 seconds. She is not diaphoretic.   Psychiatric: She has a normal mood and affect.   Nursing note and vitals reviewed.      Vents:  Oxygen Concentration (%): 28 (07/13/18 2329)  Lines/Drains/Airways     Drain                 Urethral Catheter 07/13/18 2230 Latex 3 days          Peripheral Intravenous Line                 Peripheral IV - Single Lumen 07/13/18 1353 Right Forearm 3 days         Peripheral IV - Single Lumen 07/13/18 1831 Right Hand 3 days         Peripheral IV - Single Lumen 07/13/18 1902 Right Antecubital 3 days              Significant Labs:    CBC/Anemia Profile:    Recent Labs  Lab 07/16/18 2002 07/16/18  2358 07/17/18  0440   WBC 7.92 7.12 7.02   HGB 7.8* 7.8* 8.2*   HCT 23.6* 24.1* 25.6*   PLT 66* 66* 63*   MCV 93 95 94   RDW 14.5 15.6* 14.4        Chemistries:    Recent Labs  Lab 07/16/18  0344 07/16/18  0730 07/16/18  1615 07/17/18  0440 07/17/18  0459    140 144  --  141   K 5.3* 4.1 3.3*  --  3.9   * 113* 116*  --  112*   CO2 20* 20* 19*  --  20*   BUN 26* 25* 25*  --  26*   CREATININE 1.2 1.1 1.1  --  1.2   CALCIUM 8.3* 8.0* 7.6*  --  8.8   ALBUMIN 2.4* 2.5* 2.3*  --  2.6*    PROT 6.1 5.8* 5.5*  --  6.1   BILITOT 2.2* 2.2* 2.3*  --  2.4*   ALKPHOS 88 86 85  --  97   * 126* 115*  --  107*   * 102* 80*  --  66*   MG 3.1*  --   --  2.5  --    PHOS 3.5  --   --  2.7  --          Significant Imaging:  I have reviewed all pertinent imaging results/findings within the past 24 hours.    Assessment/Plan:     Neuro   Brain aneurysm    -patient reports that she has a brain aneurysm for which she takes lisinopril 20mg  -will hold in setting of MAURO and hypotension  -can restart when both resolve        Renal/   MAURO (acute kidney injury)    -patients Cr increased to 1.4 and then 1.6 (no kidney issues on baseline)  -most likely 2/2 to hypoperfusion  RESOLVED        Hyperkalemia    -resolved        Hematology   Antiphospholipid syndrome    - On Coumadin at home (held for now) - Consider IVC Filter in the interim  - INR corrected (5 on admission - currently 1.1)   - F/U - DRVVT, Cardiolipin ab, Phosphatidylserine ab  - Beta 2 microglobulin positive  - Haptoglobin - 199  - Fibrinogen - 518  - D-dimer quant - 1.81  - Bilateral LE ultrasound negative for DVT    Plan to consult IR vs Vascular surgery for consideration of IVC filter          Oncology   Leukocytosis    -  Resolved  - Procal insignificant  - Vanc and Zosyn D/C'd        Endocrine   Hypothyroidism    -patient is s/p thyroid CA and had a thyroidectomy  -she takes synthroid daily 150 mcg        GI   Transaminitis    rising LFTs - CBD at 7mm but no intrahepatic dilatation  - LFT's trending downward        Orthopedic   * Perinephric hematoma    - 7/14: Perinephric hematoma measuring [id number] with evidence of mass effect on right kidney as well as free fluid noted in pelvis on CT scan (unknown how this compares to outside CT as no records/CT scan were available to MICU team - Urology has previous CT in there possession and are following patient)   - 7/15: Repeat CTA with grossly stable size of hematoma  - S/P transfusion 5 units  pRBCs (last transfused 7/15)  - Consulted urology, appreciate recommendations    - follow CBC q6h  - likely step down to floor tomorrow (will monitor for 24 more hours to ensure stability)  - hold anticoagulation and consider IVC           Critical Care Daily Checklist:    A: Awake: RASS Goal/Actual Goal:    Actual: Garcia Agitation Sedation Scale (RASS): Alert and calm   B: Spontaneous Breathing Trial Performed?     C: SAT & SBT Coordinated?  n/a                      D: Delirium: CAM-ICU Overall CAM-ICU: Negative   E: Early Mobility Performed? No   F: Feeding Goal:    Status:     Current Diet Order   Procedures    Diet Adult Regular (IDDSI Level 7)      AS: Analgesia/Sedation n/a   T: Thromboembolic Prophylaxis Contraindicated. SCDs   H: HOB > 300 Yes   U: Stress Ulcer Prophylaxis (if needed) protonix   G: Glucose Control n/a   B: Bowel Function     I: Indwelling Catheter (Lines & Orellana) Necessity Orellana catheter- d/c'ed   D: De-escalation of Antimicrobials/Pharmacotherapies n/a    Plan for the day/ETD Consult IR for IVC, likely step down tomorrow    Code Status:  Family/Goals of Care: Full Code         Critical secondary to Patient has a condition that poses threat to life and bodily function: retroperitoneal hematoma      Critical care was time spent personally by me on the following activities: development of treatment plan with patient or surrogate and bedside caregivers, discussions with consultants, evaluation of patient's response to treatment, examination of patient, ordering and performing treatments and interventions, ordering and review of laboratory studies, ordering and review of radiographic studies, pulse oximetry, re-evaluation of patient's condition. This critical care time did not overlap with that of any other provider or involve time for any procedures.     Radha Rush, DO  Critical Care Medicine  [facility]

## 2018-07-18 LAB
ALBUMIN SERPL BCP-MCNC: 2.4 G/DL
ALP SERPL-CCNC: 121 U/L
ALT SERPL W/O P-5'-P-CCNC: 87 U/L
ANION GAP SERPL CALC-SCNC: 9 MMOL/L
ANISOCYTOSIS BLD QL SMEAR: SLIGHT
APTT BLDCRRT: 44.6 SEC
APTT HEX PL PPP: POSITIVE S
AST SERPL-CCNC: 49 U/L
BASO STIPL BLD QL SMEAR: ABNORMAL
BASOPHILS # BLD AUTO: 0 K/UL
BASOPHILS # BLD AUTO: 0.01 K/UL
BASOPHILS NFR BLD: 0 %
BASOPHILS NFR BLD: 0.1 %
BILIRUB SERPL-MCNC: 2.5 MG/DL
BUN SERPL-MCNC: 21 MG/DL
BURR CELLS BLD QL SMEAR: ABNORMAL
CALCIUM SERPL-MCNC: 9 MG/DL
CHLORIDE SERPL-SCNC: 107 MMOL/L
CO2 SERPL-SCNC: 22 MMOL/L
CREAT SERPL-MCNC: 0.9 MG/DL
DIFFERENTIAL METHOD: ABNORMAL
DIFFERENTIAL METHOD: ABNORMAL
EOSINOPHIL # BLD AUTO: 0 K/UL
EOSINOPHIL # BLD AUTO: 0 K/UL
EOSINOPHIL NFR BLD: 0 %
EOSINOPHIL NFR BLD: 0.1 %
ERYTHROCYTE [DISTWIDTH] IN BLOOD BY AUTOMATED COUNT: 13.5 %
ERYTHROCYTE [DISTWIDTH] IN BLOOD BY AUTOMATED COUNT: 13.9 %
EST. GFR  (AFRICAN AMERICAN): >60 ML/MIN/1.73 M^2
EST. GFR  (NON AFRICAN AMERICAN): >60 ML/MIN/1.73 M^2
GLUCOSE SERPL-MCNC: 119 MG/DL
HCT VFR BLD AUTO: 26.5 %
HCT VFR BLD AUTO: 27.7 %
HGB BLD-MCNC: 8.8 G/DL
HGB BLD-MCNC: 9.2 G/DL
IMM GRANULOCYTES # BLD AUTO: 0.06 K/UL
IMM GRANULOCYTES # BLD AUTO: 0.07 K/UL
IMM GRANULOCYTES NFR BLD AUTO: 0.7 %
IMM GRANULOCYTES NFR BLD AUTO: 0.9 %
INR PPP: 1.1
LYMPHOCYTES # BLD AUTO: 0.4 K/UL
LYMPHOCYTES # BLD AUTO: 0.4 K/UL
LYMPHOCYTES NFR BLD: 4.9 %
LYMPHOCYTES NFR BLD: 5.4 %
MAGNESIUM SERPL-MCNC: 2.3 MG/DL
MCH RBC QN AUTO: 29.9 PG
MCH RBC QN AUTO: 30.2 PG
MCHC RBC AUTO-ENTMCNC: 33.2 G/DL
MCHC RBC AUTO-ENTMCNC: 33.2 G/DL
MCV RBC AUTO: 90 FL
MCV RBC AUTO: 91 FL
MONOCYTES # BLD AUTO: 0.6 K/UL
MONOCYTES # BLD AUTO: 0.8 K/UL
MONOCYTES NFR BLD: 7.7 %
MONOCYTES NFR BLD: 9.6 %
NEUTROPHILS # BLD AUTO: 7 K/UL
NEUTROPHILS # BLD AUTO: 7.2 K/UL
NEUTROPHILS NFR BLD: 84.6 %
NEUTROPHILS NFR BLD: 86 %
NRBC BLD-RTO: 0 /100 WBC
NRBC BLD-RTO: 0 /100 WBC
PHOSPHATE SERPL-MCNC: 3.6 MG/DL
PLATELET # BLD AUTO: 55 K/UL
PLATELET # BLD AUTO: 58 K/UL
PLATELET BLD QL SMEAR: ABNORMAL
PMV BLD AUTO: 10.5 FL
PMV BLD AUTO: 11.2 FL
POIKILOCYTOSIS BLD QL SMEAR: SLIGHT
POLYCHROMASIA BLD QL SMEAR: ABNORMAL
POTASSIUM SERPL-SCNC: 4.5 MMOL/L
PROT SERPL-MCNC: 6.3 G/DL
PROTHROMBIN TIME: 11.7 SEC
RBC # BLD AUTO: 2.94 M/UL
RBC # BLD AUTO: 3.05 M/UL
SODIUM SERPL-SCNC: 138 MMOL/L
WBC # BLD AUTO: 8.14 K/UL
WBC # BLD AUTO: 8.5 K/UL

## 2018-07-18 PROCEDURE — 63600175 PHARM REV CODE 636 W HCPCS: Performed by: RADIOLOGY

## 2018-07-18 PROCEDURE — 25000003 PHARM REV CODE 250: Performed by: STUDENT IN AN ORGANIZED HEALTH CARE EDUCATION/TRAINING PROGRAM

## 2018-07-18 PROCEDURE — 25500020 PHARM REV CODE 255: Performed by: INTERNAL MEDICINE

## 2018-07-18 PROCEDURE — 99233 SBSQ HOSP IP/OBS HIGH 50: CPT | Mod: ,,, | Performed by: INTERNAL MEDICINE

## 2018-07-18 PROCEDURE — 97116 GAIT TRAINING THERAPY: CPT

## 2018-07-18 PROCEDURE — 80053 COMPREHEN METABOLIC PANEL: CPT

## 2018-07-18 PROCEDURE — 99900035 HC TECH TIME PER 15 MIN (STAT)

## 2018-07-18 PROCEDURE — 25000003 PHARM REV CODE 250: Performed by: NURSE PRACTITIONER

## 2018-07-18 PROCEDURE — 27000221 HC OXYGEN, UP TO 24 HOURS

## 2018-07-18 PROCEDURE — 20000000 HC ICU ROOM

## 2018-07-18 PROCEDURE — 97110 THERAPEUTIC EXERCISES: CPT

## 2018-07-18 PROCEDURE — 06H03DZ INSERTION OF INTRALUMINAL DEVICE INTO INFERIOR VENA CAVA, PERCUTANEOUS APPROACH: ICD-10-PCS | Performed by: RADIOLOGY

## 2018-07-18 PROCEDURE — 63600175 PHARM REV CODE 636 W HCPCS: Performed by: NURSE PRACTITIONER

## 2018-07-18 PROCEDURE — 85730 THROMBOPLASTIN TIME PARTIAL: CPT

## 2018-07-18 PROCEDURE — 84100 ASSAY OF PHOSPHORUS: CPT

## 2018-07-18 PROCEDURE — 83735 ASSAY OF MAGNESIUM: CPT

## 2018-07-18 PROCEDURE — 85025 COMPLETE CBC W/AUTO DIFF WBC: CPT

## 2018-07-18 PROCEDURE — 94761 N-INVAS EAR/PLS OXIMETRY MLT: CPT

## 2018-07-18 PROCEDURE — 85610 PROTHROMBIN TIME: CPT

## 2018-07-18 RX ORDER — IPRATROPIUM BROMIDE AND ALBUTEROL SULFATE 2.5; .5 MG/3ML; MG/3ML
3 SOLUTION RESPIRATORY (INHALATION) EVERY 6 HOURS PRN
Status: ACTIVE | OUTPATIENT
Start: 2018-07-18 | End: 2018-07-21

## 2018-07-18 RX ORDER — MIDAZOLAM HYDROCHLORIDE 1 MG/ML
INJECTION INTRAMUSCULAR; INTRAVENOUS CODE/TRAUMA/SEDATION MEDICATION
Status: COMPLETED | OUTPATIENT
Start: 2018-07-18 | End: 2018-07-18

## 2018-07-18 RX ORDER — DOCUSATE SODIUM 50 MG/5ML
100 LIQUID ORAL DAILY
Status: DISCONTINUED | OUTPATIENT
Start: 2018-07-18 | End: 2018-07-19

## 2018-07-18 RX ADMIN — PANTOPRAZOLE SODIUM 40 MG: 40 TABLET, DELAYED RELEASE ORAL at 10:07

## 2018-07-18 RX ADMIN — DIPHENHYDRAMINE HYDROCHLORIDE 50 MG: 25 CAPSULE ORAL at 06:07

## 2018-07-18 RX ADMIN — PREDNISONE 50 MG: 20 TABLET ORAL at 01:07

## 2018-07-18 RX ADMIN — DOCUSATE SODIUM 100 MG: 50 LIQUID ORAL at 01:07

## 2018-07-18 RX ADMIN — IOHEXOL 15 ML: 300 INJECTION, SOLUTION INTRAVENOUS at 08:07

## 2018-07-18 RX ADMIN — MIDAZOLAM HYDROCHLORIDE 1 MG: 1 INJECTION, SOLUTION INTRAMUSCULAR; INTRAVENOUS at 07:07

## 2018-07-18 RX ADMIN — RAMELTEON 8 MG: 8 TABLET, FILM COATED ORAL at 08:07

## 2018-07-18 RX ADMIN — TRAMADOL HYDROCHLORIDE 50 MG: 50 TABLET, COATED ORAL at 01:07

## 2018-07-18 RX ADMIN — LEVOTHYROXINE SODIUM 150 MCG: 75 TABLET ORAL at 06:07

## 2018-07-18 RX ADMIN — PREDNISONE 50 MG: 20 TABLET ORAL at 06:07

## 2018-07-18 NOTE — SUBJECTIVE & OBJECTIVE
Interval History:     JUDITH  Feels well  Hgb stable, no transfusions overnight  No longer tachycardic  Tachypnea improving   OOB to bathroom x2    Review of Systems    Objective:     Temp:  [98.8 °F (37.1 °C)-100.8 °F (38.2 °C)] 98.8 °F (37.1 °C)  Pulse:  [] 91  Resp:  [19-29] 26  SpO2:  [91 %-100 %] 93 %  BP: (114-186)/(58-87) 148/82     Body mass index is 36.37 kg/m².       Bladder Scan Volume (mL): 200 mL (07/13/18 2115)    Drains     Drain                 Urethral Catheter 07/13/18 2230 Latex 3 days                Physical Exam   Constitutional: No distress.   HENT:   Head: Normocephalic and atraumatic.   Eyes: Conjunctivae are normal. No scleral icterus.   Neck: Normal range of motion.   Cardiovascular: Normal rate.    Pulmonary/Chest: Effort normal. No respiratory distress.   Abdominal: Soft. She exhibits no distension. There is tenderness. There is no rebound and no guarding.   Mild right upper and lower quadrant tenderness  No left sided tenderness  right flank tenderness, no flank ecchymosis  Abdomen soft  Cholecystectomy and hernia repair scars well healed   Musculoskeletal: Normal range of motion. She exhibits edema.   Neurological: She is alert.   Skin: Skin is warm and dry. She is not diaphoretic.     Psychiatric: She has a normal mood and affect.       Significant Labs:    BMP:    Recent Labs  Lab 07/17/18  0459 07/17/18  1713 07/18/18  0345    141 138   K 3.9 3.8 4.5   * 110 107   CO2 20* 24 22*   BUN 26* 21 21   CREATININE 1.2 1.0 0.9   CALCIUM 8.8 8.6* 9.0       CBC:     Recent Labs  Lab 07/17/18  1713 07/17/18  2347 07/18/18  0345   WBC 6.98 8.50 8.14   HGB 8.0* 8.8* 9.2*   HCT 25.2* 26.5* 27.7*   PLT 62* 55* 58*       All pertinent labs results from the past 24 hours have been reviewed.    Significant Imaging:  All pertinent imaging results/findings from the past 24 hours have been reviewed.

## 2018-07-18 NOTE — PLAN OF CARE
Problem: Physical Therapy Goal  Goal: Physical Therapy Goal  Goals to be met by:      Patient will increase functional independence with mobility by performin. Supine to sit with Stand-by Assistance - met   2. Sit to supine with Stand-by Assistance - not met  3. Sit to stand transfer with Stand-by Assistance - not met  4. Gait  x 100 feet with Stand-by Assistance - not met   Goals remain appropriate. Continue with Physical therapy Plan of Care. Cynthia Blackburn, PT 2018

## 2018-07-18 NOTE — PLAN OF CARE
Problem: Patient Care Overview  Goal: Plan of Care Review  Outcome: Ongoing (interventions implemented as appropriate)  VSS throughout shift, currently on 3L NC, O2 at 99%. Pt tolerated sitting up in chair well. Pt went to IR in AM for IVC filter placement, tolerated well. Transfer orders in at 13:30. Pt able to have bowel movement after receiving Colace. Family participating in care and at bedside. MD Carolina updated patient and family on plan of care. Will continue to monitor.

## 2018-07-18 NOTE — PHYSICIAN QUERY
PT Name: Emiliana Persaud  MR #: [id number]    Physician Query Form - Perfusion Diagnosis Clarification     Ivon Edwards RN CDS    Contact Information: [phone]    This form is a permanent document in the medical record.     Query Date: July 18, 2018    By submitting this query, we are merely seeking further clarification of documentation. Please utilize your independent clinical judgment when addressing the question(s) below.    The medical record contains the following:    Indicators   Supporting Clinical Findings   Location in Medical Record   X Acute Illness (e.g. AMI, Sepsis, etc.) past medical history of antiphospholipid syndrome, thyroid cancer, IBS, brain aneurysm, and chronic migraines   Perinephric hematoma   Hyperkalemia   bleeding right kidney, non traumatic, patient takes coumadin       Patient had a rapid response called due to lethargy, found to be hemodynamically unstable with SBP in 90s, unresponsive to fluid bolus.    Hypoxemic Respiratory failure, acute: atelectasis/pleural effusion, high risk for intubation in light of ongoing resuscitation efforts and IAH/ACS risk  7/13 H&P                        7/15 Critical Care Medicine PN      Acidosis documented     X ABGs / Labs Hgb 11.0, 10.7, 9.7, 10.5, 10.6, 9.7, 9.4, 8.8, 8.8, 6.6, 7.4, 7.1, 8.2, 7.5, 7.9    Hct 35.1, 32.5, 29.0, 31.5, 30.7, 29.5, 28.2, 26.2, 24.9, 19.3, 21.6, 21.4, 25.6, 22.6, 24.9    Potassium 5.7  Chloride 112  CO2 18  GFR 48.9  Lactic acid 2.7 7/13-7/16 Lab            7/13 Lab        7/14 Lab   X Vital Signs BP 94/45, 84/51, 89/61, 86/62    , 121, 116, 157, 125, 133, 130, 140    Resp 30, 28, 27,26, 29    Temp 100.8 7/13 -7/16   X Hypotension or Low Blood Pressure documented Rapid Response Indication(s): Lethargy Hypotension    7/13 Significant Event    Altered Mental Status or Confusion     X Diaphoresis, Cold Extremities or Cyanosis On the hospital floor, code response team was called due to patient feeling lethargic, weak,  and diaphoretic.    What did you find: Upon assessment patient found to be lying in bed, eyes closed, and very diaphoretic.     7/13 H&P          7/13 Significant Event    Oliguria     X Medication/Treatment:  -Vasopressors  -Inotropic Drugs  -IV Fluids  -Cardiac Assist Devices  -Hemodynamic Monitoring  -Blood/Blood Products Rt. IVC Placement     Transfuse Fresh Frozen Plasma 1 units  Transfuse RBC 1 unit     Transfuse RBC 2 units   Transfuse platelets 1 Dose   Transfuse platelets 1 dose    Transfuse RBC 1 unit   Transfuse platelets 1 dose  Transfuse platelets 1 dose    Pulse Oximetry  Q4 Every  4 hours     sodium phosphate 15 mmol in dextrose 5 % 250 mL IVPB  :    magnesium sulfate 2g in water 50mL IVPB (premix)  :    vancomycin (VANCOCIN) 2,500 mg in dextrose 5 % 500 mL IVPB  phytonadione vitamin k (AQUA-MEPHYTON) 10 mg in dextrose 5 % 50 mL IVPB  :     lactated ringers bolus 500 mL  :  Dose 500 mL  :  Intravenous  :  Once  x 2 doses  actated ringers bolus 1000 mL  :  Dose 1000 mL  :  Intravenous  :  Once      0.9%  NaCl infusion  :  Dose 1,000 mL  :  150 mL/hr  :  Intravenous  :  ED 1 Time    lactated ringers infusion  :  100 mL/hr  :  Intravenous  :  Once   7/18 Imaging    7/13 Transfusion orders      7/15 Transfusion orders        7/16Transfusion orders              7/14 -7/15 MAR        7/13-7/14 MAR      7/13 MAR       X Other: Patient is a 63 yo F with significant past medical history of antiphospholipid syndrome, thyroid cancer, IBS, brain aneurysm, and chronic migraines who presents from [facility] ED in [place], [place] with severe right lower back and flank pain.      On the hospital floor, code response team was called due to patient feeling lethargic, weak, and diaphoretic. Team went to see patient. Extremely lethargic but easily aroused. Oriented X4 with no motor deficits noted. BS performed revealed a BS of 258.  BP 94/65 RR 12 O2 94% on RA. Placed on 2L NC   7/13 H&P     Provider,  please specify diagnosis or diagnoses associated with above clinical findings.      [ x ] Hemorrhagic Shock  [  ] Hypovolemic Shock  [  ] Septic Shock  [  ] Other Shock (please specify): _________________________________  [  ] Shock Unspecified  [  ] Other Condition (please specify): ___________________________________  [  ] Clinically Undetermined    Please document in your progress notes daily for the duration of treatment until resolved and include in your discharge summary.

## 2018-07-18 NOTE — ASSESSMENT & PLAN NOTE
- On Coumadin at home (held for now) - IVC Filter placed 7/18  - INR corrected (5 on admission - currently 1.1)   - F/U - DRVVT, Phosphatidylserine ab  - Cardiolipin ab - APA Isotype IgG Positive at > 150  - Beta 2 microglobulin positive  - Haptoglobin - 199  - Fibrinogen - 518  - D-dimer quant - 1.81  - Bilateral LE ultrasound negative for DVT

## 2018-07-18 NOTE — PROGRESS NOTES
[facility]  Critical Care Medicine  Progress Note    Patient Name: Emiliana Persaud  MRN: [id number]  Admission Date: 7/13/2018  Hospital Length of Stay: 5 days  Code Status: Full Code  Attending Provider: Ayan Figueroa MD  Primary Care Provider: [facility]   Principal Problem: Perinephric hematoma    Subjective:     HPI:   Patient is a 61 yo F with significant past medical history of antiphospholipid syndrome, thyroid cancer, IBS, brain aneurysm, and chronic migraines who presents from [facility] ED in [place], [place] with concerns of a perinephric hematoma.    Patient states that the pain began 7/12 at 11 pm. It progressively got worse. She did not try anything for the pain. The pain has been constant. It radiates to the right abdomen. Patient endorsed hematuria, HA, nausea, thirst, dry mouth. he denies chest pain, SOB. CT at OSH showed perinephric hematoma. Outside labs show INR of 2.6.  OSH labs wre WBC/10.9, H/H 13.1/37.6  One unit of blood was given in route to [facility]. Patient received morphine, dilaudid, and phenergen at outside hospital.     Patient completed 2 blood transfusions in [facility] ED.  At that time, patient was admitted to hospital medicine.  Hg was 10.7 s/p 2 U.  Urology and IR consulted for retroperitoneal bleed.  They stated that there was intervention needed at this time.  In the evening of 7/13, code response team was called due to patient feeling lethargic, weak, and diaphoretic. Team went to see patient. Extremely lethargic but easily aroused.  Oriented X4 with no motor deficits noted.  BS performed revealed a BS of 258.   BP 94/65 RR 12 O2 94% on RA.  Placed on 2L NC.  Labs including POCT glucose, ABG, lactic acid, CMP, CBC, PT/INR were ordered. Blood pressure started dropping: SBP running in the mid 90s and DBP in the mid 40s-low 50s. Bolus of saline and FFP were ordered and administered.     Consulted ICU for hypotension.      Hospital/ICU Course:  07/14 - 61 yo F with PMHx antiphospholipid syndrome, RLE DVT (resolved), right acoustic neuroma, thyroid ca, brain aneurysm admitted from OSH with spontaneous perinephric hematoma as denies trauma or recent surgical intervention. INR 5 on admission, s/p 3u PRBCs total.    - Due to dropping blood pressures (SBP in mid 80s with DBP in mid 50s) admitted to ICU   -since admit to the ICU she has had adequate BP with SBP >120 and MAPs >65  Urology and IR consulted overnight and following.  07/15 - Multiple blood products transfused throughout the day. CTA was unable to appreciate any active bleeding.  07/16 - Bilateral LE Ultrasound negative for DVT. Discussed IVC Filter placement  07/17 - H/H Stable. No blood products given  07/18 - IVC Filter Placed without incident.     Interval History/Significant Events: No acute event overnight. Pain is overall improving. Pt reports constipation. Colace ordered. Stepdown to hospital medicine today.     Review of Systems   Constitutional: Negative for chills and diaphoresis.   HENT: Negative for nosebleeds and sore throat.    Eyes: Negative for discharge and visual disturbance.   Respiratory: Negative for chest tightness, shortness of breath and wheezing.    Cardiovascular: Negative for chest pain and leg swelling.   Gastrointestinal: Positive for constipation. Negative for abdominal pain and blood in stool.   Endocrine: Negative for cold intolerance and heat intolerance.   Genitourinary: Negative for difficulty urinating, hematuria and urgency.   Musculoskeletal: Negative for myalgias and neck stiffness.   Skin: Negative for color change and wound.   Neurological: Negative for dizziness and facial asymmetry.   Hematological: Negative for adenopathy. Does not bruise/bleed easily.   Psychiatric/Behavioral: Negative for decreased concentration and suicidal ideas.     Objective:     Vital Signs (Most Recent):  Temp: 98.3 °F (36.8 °C) (07/18/18 1100)  Pulse: 79  (07/18/18 1200)  Resp: (!) 23 (07/18/18 1200)  BP: (!) 154/72 (07/18/18 1200)  SpO2: (!) 94 % (07/18/18 1200) Vital Signs (24h Range):  Temp:  [98.3 °F (36.8 °C)-100.8 °F (38.2 °C)] 98.3 °F (36.8 °C)  Pulse:  [] 79  Resp:  [20-29] 23  SpO2:  [89 %-100 %] 94 %  BP: (120-158)/(58-87) 154/72   Weight: 108.5 kg (239 lb 3.2 oz)  Body mass index is 36.37 kg/m².      Intake/Output Summary (Last 24 hours) at 07/18/18 1256  Last data filed at 07/18/18 1200   Gross per 24 hour   Intake                0 ml   Output             1400 ml   Net            -1400 ml       Physical Exam   Constitutional: She is oriented to person, place, and time. She appears well-developed and well-nourished. No distress.   HENT:   Head: Normocephalic and atraumatic.   Eyes: Conjunctivae are normal. Pupils are equal, round, and reactive to light.   Neck: Normal range of motion. No thyromegaly present.   Cardiovascular: Normal rate, regular rhythm and normal heart sounds.  Exam reveals no gallop and no friction rub.    No murmur heard.  Pulmonary/Chest: Effort normal and breath sounds normal.   Abdominal: Soft. There is no tenderness.   Musculoskeletal: Normal range of motion. She exhibits no deformity.   Neurological: She is alert and oriented to person, place, and time. No cranial nerve deficit.   Skin: Skin is warm and dry. Capillary refill takes less than 2 seconds. She is not diaphoretic.   Psychiatric: She has a normal mood and affect.   Nursing note and vitals reviewed.      Vents:  Oxygen Concentration (%): 28 (07/13/18 2329)  Lines/Drains/Airways     Peripheral Intravenous Line                 Midline Catheter Insertion/Assessment  - Single Lumen 07/17/18 1130 Right basilic vein (medial side of arm) 18g x 10cm 1 day         Peripheral IV - Single Lumen 07/17/18 2135 Left Forearm less than 1 day              Significant Labs:    CBC/Anemia Profile:    Recent Labs  Lab 07/17/18  1713 07/17/18  2347 07/18/18  0345   WBC 6.98 8.50 8.14    HGB 8.0* 8.8* 9.2*   HCT 25.2* 26.5* 27.7*   PLT 62* 55* 58*   MCV 94 90 91   RDW 14.4 13.9 13.5        Chemistries:    Recent Labs  Lab 07/17/18  0440 07/17/18  0459 07/17/18  1713 07/18/18  0345   NA  --  141 141 138   K  --  3.9 3.8 4.5   CL  --  112* 110 107   CO2  --  20* 24 22*   BUN  --  26* 21 21   CREATININE  --  1.2 1.0 0.9   CALCIUM  --  8.8 8.6* 9.0   ALBUMIN  --  2.6* 2.4* 2.4*   PROT  --  6.1 5.9* 6.3   BILITOT  --  2.4* 2.4* 2.5*   ALKPHOS  --  97 107 121   ALT  --  107* 92* 87*   AST  --  66* 53* 49*   MG 2.5  --   --  2.3   PHOS 2.7  --   --  3.6       Recent Lab Results       07/18/18  0345 07/17/18  2347 07/17/18  1818 07/17/18  1713      Immature Granulocytes 0.9(H) 0.7(H)  0.6(H)     Immature Grans (Abs) 0.07  Comment:  Mild elevation in immature granulocytes is non specific and   can be seen in a variety of conditions including stress response,   acute inflammation, trauma and pregnancy. Correlation with other   laboratory and clinical findings is essential.  (H) 0.06  Comment:  Mild elevation in immature granulocytes is non specific and   can be seen in a variety of conditions including stress response,   acute inflammation, trauma and pregnancy. Correlation with other   laboratory and clinical findings is essential.  (H)  0.04  Comment:  Mild elevation in immature granulocytes is non specific and   can be seen in a variety of conditions including stress response,   acute inflammation, trauma and pregnancy. Correlation with other   laboratory and clinical findings is essential.       Albumin 2.4(L)   2.4(L)     Alkaline Phosphatase 121   107     ALT 87(H)   92(H)     Anion Gap 9   7(L)     Aniso  Slight       aPTT 44.6  Comment:  aPTT therapeutic range = 39-69 seconds(H)        AST 49(H)   53(H)     Baso # 0.00 0.01  0.01     Basophilic Stippling  Occasional       Basophil% 0.0 0.1  0.1     Total Bilirubin 2.5  Comment:  For infants and newborns, interpretation of results should be based  on  gestational age, weight and in agreement with clinical  observations.  Premature Infant recommended reference ranges:  Up to 24 hours.............<8.0 mg/dL  Up to 48 hours............<12.0 mg/dL  3-5 days..................<15.0 mg/dL  6-29 days.................<15.0 mg/dL  (H)   2.4  Comment:  For infants and newborns, interpretation of results should be based  on gestational age, weight and in agreement with clinical  observations.  Premature Infant recommended reference ranges:  Up to 24 hours.............<8.0 mg/dL  Up to 48 hours............<12.0 mg/dL  3-5 days..................<15.0 mg/dL  6-29 days.................<15.0 mg/dL  (H)     Blood Culture, Routine   No Growth to date[P]         No Growth to date[P]      BUN, Bld 21   21     Clio Cells  Occasional       Calcium 9.0   8.6(L)     Chloride 107   110     CO2 22(L)   24     Creatinine 0.9   1.0     Differential Method Automated Automated  Automated     eGFR if  >60.0   >60.0     eGFR if non  >60.0  Comment:  Calculation used to obtain the estimated glomerular filtration  rate (eGFR) is the CKD-EPI equation.      >60.0  Comment:  Calculation used to obtain the estimated glomerular filtration  rate (eGFR) is the CKD-EPI equation.        Eos # 0.0 0.0  0.1     Eosinophil% 0.0 0.1  0.9     Glucose 119(H)   98     Gran # (ANC) 7.0 7.2  5.2     Gran% 86.0(H) 84.6(H)  74.0(H)     Hematocrit 27.7(L) 26.5(L)  25.2(L)     Hemoglobin 9.2(L) 8.8(L)  8.0(L)     Coumadin Monitoring INR 1.1  Comment:  Coumadin Therapy:  2.0 - 3.0 for INR for all indicators except mechanical heart valves  and antiphospholipid syndromes which should use 2.5 - 3.5.          Lymph # 0.4(L) 0.4(L)  0.7(L)     Lymph% 5.4(L) 4.9(L)  9.9(L)     Magnesium 2.3        MCH 30.2 29.9  29.7     MCHC 33.2 33.2  31.7(L)     MCV 91 90  94     Mono # 0.6 0.8  1.0     Mono% 7.7 9.6  14.5     MPV 11.2 10.5  10.5     nRBC 0 0  0     Phosphorus 3.6        Platelet Estimate   Decreased(A)       Platelets 58(L) 55(L)  62(L)     Poik  Slight       Poly  Occasional       Potassium 4.5   3.8     Total Protein 6.3   5.9(L)     Protime 11.7        RBC 3.05(L) 2.94(L)  2.69(L)     RDW 13.5 13.9  14.4     Sodium 138   141     WBC 8.14 8.50  6.98         All pertinent labs within the past 24 hours have been reviewed.    Significant Imaging:  I have reviewed all pertinent imaging results/findings within the past 24 hours.    Assessment/Plan:     Neuro   Brain aneurysm    -patient reports that she has a brain aneurysm for which she takes lisinopril 20mg  -will hold in setting of MAURO and hypotension  -can restart when both resolve        Renal/   Hyperkalemia    -resolved        MAURO (acute kidney injury)    -patients Cr increased to 1.4 and then 1.6 (no kidney issues on baseline)  -most likely 2/2 to hypoperfusion  - 7/18 - Cr 1.2          Hematology   Antiphospholipid syndrome    - On Coumadin at home (held for now) - IVC Filter placed 7/18  - INR corrected (5 on admission - currently 1.1)   - F/U - DRVVT, Phosphatidylserine ab  - Cardiolipin ab - APA Isotype IgG Positive at > 150  - Beta 2 microglobulin positive  - Haptoglobin - 199  - Fibrinogen - 518  - D-dimer quant - 1.81  - Bilateral LE ultrasound negative for DVT        Oncology   Leukocytosis    -  Resolved  - Procal insignificant  - Vanc and Zosyn D/C'd        Endocrine   Hypothyroidism    -patient is s/p thyroid CA and had a thyroidectomy  -she takes synthroid daily 150 mcg        GI   Transaminitis    rising LFTs - CBD at 7mm but no intrahepatic dilatation  - LFT's trending downward        Orthopedic   * Perinephric hematoma    - 7/14: Perinephric hematoma measuring [id number] with evidence of mass effect on right kidney as well as free fluid noted in pelvis on CT scan (unknown how this compares to outside CT as no records/CT scan were available to MICU team - Urology has previous CT in there possession and are following patient.  Appreciate their recs.  - 7/15: Repeat CTA with grossly stable size of hematoma  - H/H Now stable s/p 7 units PRBCs, 2FFP, 4 Platelets (last transfused 7/15)  - Trend CBC qd             Eric Lara MD  Critical Care Medicine  [facility]

## 2018-07-18 NOTE — PLAN OF CARE
Problem: Patient Care Overview  Goal: Plan of Care Review  Outcome: Ongoing (interventions implemented as appropriate)  Pt on 4L NC, sats 94%  Afebrile, all VSS  No gtts  Pt voided 950mL UOP per bedside commode  No BM  CBC trended q8, CMP 2x daily, see results review  Pain managed with PRN tramadol q6hrs  Pt AAOx4, following commands, moving all extremities, encouraged to turn q2 to prevent skin breakdown  Plan for IVC filter placement in AM, preop checklist completed, pt premedicated per orders  Plan of care reviewed with pt and daughter, all questions answered  Will continue to monitor

## 2018-07-18 NOTE — PT/OT/SLP PROGRESS
Physical Therapy Treatment    Patient Name:  Emiliana Persaud   MRN:  [id number]    Recommendations:     Discharge Recommendations:  home, home with home health   Discharge Equipment Recommendations: none   Barriers to discharge: None    Assessment:     Emiliana Persaud is a 62 y.o. female admitted with a medical diagnosis of Perinephric hematoma.  She presents with the following impairments/functional limitations:  weakness, impaired endurance, impaired functional mobilty, gait instability patient participated well today w/o c/o pain. Increased amb distance to 75 feet w/ Hand hold assist/ min assist. Patient reports she expects to move to step down unit today or tomorrow.    Rehab Prognosis:  good; patient would benefit from acute skilled PT services to address these deficits and reach maximum level of function.      Recent Surgery: Procedure(s) (LRB):  CTA (N/A) 3 Days Post-Op    Plan:     During this hospitalization, patient to be seen 4 x/week to address the above listed problems via gait training, therapeutic activities, therapeutic exercises  · Plan of Care Expires:  08/13/18   Plan of Care Reviewed with: patient, family    Subjective     Communicated with nurse prior to session.  Patient found seated in bedside chair w/ family upon PT entry to room, agreeable to treatment.      Chief Complaint: c/o colace bothering her throat  Patient comments/goals: Enjoys walking and being out of bed  Pain/Comfort:  Pain Rating 1: 0/10  Pain Rating Post-Intervention 1: 0/10    Patients cultural, spiritual, Spiritism conflicts given the current situation: none reported     Objective:     Patient found with: blood pressure cuff, pulse ox (continuous), telemetry     General Precautions: Standard, fall   Orthopedic Precautions:N/A   Braces: N/A     Functional Mobility:  · Transfers:     · Sit to Stand:  contact guard assistance with no AD  · Toilet Transfer: contact guard assistance with  no AD  using  Stand Pivot  · Gait: 75 feet in  room w/ Hand held/min assist. Lines limiting.      AM-PAC 6 CLICK MOBILITY  Turning over in bed (including adjusting bedclothes, sheets and blankets)?: 4  Sitting down on and standing up from a chair with arms (e.g., wheelchair, bedside commode, etc.): 3  Moving from lying on back to sitting on the side of the bed?: 4  Moving to and from a bed to a chair (including a wheelchair)?: 3  Need to walk in hospital room?: 3  Climbing 3-5 steps with a railing?: 3  Basic Mobility Total Score: 20       Therapeutic Activities and Exercises:   patient performed BLE exercises reclining in chair and sitting: QS, GS, QS, AP, LAQ x20  Instruct to perform as exercise program and verbalizes understanding  At end of gait trial, patient sits on BSC but does not void. Transfer from BSC to chair.    Patient left up in chair with all lines intact, call button in reach, nurse notified and family present..    GOALS:    Physical Therapy Goals        Problem: Physical Therapy Goal    Goal Priority Disciplines Outcome Goal Variances Interventions   Physical Therapy Goal     PT/OT, PT Ongoing (interventions implemented as appropriate)     Description:  Goals to be met by:      Patient will increase functional independence with mobility by performin. Supine to sit with Stand-by Assistance - met   2. Sit to supine with Stand-by Assistance - not met  3. Sit to stand transfer with Stand-by Assistance - not met  4. Gait  x 100 feet with Stand-by Assistance - not met                    Time Tracking:     PT Received On: 18  PT Start Time: 1409     PT Stop Time: 1435  PT Total Time (min): 26 min     Billable Minutes: Gait Training 18 and Therapeutic Exercise 8    Treatment Type: Treatment  PT/PTA: PT     PTA Visit Number: 0     Cynthia Blackburn, PT  2018

## 2018-07-18 NOTE — PROGRESS NOTES
[facility]  Urology  Progress Note    Patient Name: Emiliana Persaud  MRN: [id number]  Admission Date: 7/13/2018  Hospital Length of Stay: 5 days  Code Status: Full Code   Attending Provider: Ayan Figueroa MD   Primary Care Physician: [facility]    Subjective:     HPI:  61yo F with history of antiphospholipid syndrome on warfarin who was brought to the ED as a transfer from [facility] for perinephric hematoma. Per her family, she woke in the middle of the night with severe right flank and abdominal pain. In the ED she was found to have a perinephric hematoma and an INR of 2.5. She was transferred to [facility] for further evaluation. When she arrived to [facility] she had a Hgb of 11 and an INR of 5.0. Her vital signs were stable. She was currently receiving a 2nd unit of PRBCs in the ED.     She has no urologic surgical history. She has had a partial thyroidectomy, cholecystectomy with incisional hernia (s/p mesh repair) and radiation for an auditory nerve tumor. Her main complaints are fatigue and abdominal and flank pain.     Interval History:     JUDITH  Feels well  Hgb stable, no transfusions overnight  No longer tachycardic  Tachypnea improving   OOB to bathroom x2    Review of Systems    Objective:     Temp:  [98.8 °F (37.1 °C)-100.8 °F (38.2 °C)] 98.8 °F (37.1 °C)  Pulse:  [] 91  Resp:  [19-29] 26  SpO2:  [91 %-100 %] 93 %  BP: (114-186)/(58-87) 148/82     Body mass index is 36.37 kg/m².       Bladder Scan Volume (mL): 200 mL (07/13/18 2115)    Drains     Drain                 Urethral Catheter 07/13/18 2230 Latex 3 days                Physical Exam   Constitutional: No distress.   HENT:   Head: Normocephalic and atraumatic.   Eyes: Conjunctivae are normal. No scleral icterus.   Neck: Normal range of motion.   Cardiovascular: Normal rate.    Pulmonary/Chest: Effort normal. No respiratory distress.   Abdominal: Soft. She exhibits no distension. There is tenderness. There  is no rebound and no guarding.   Mild right upper and lower quadrant tenderness  No left sided tenderness  right flank tenderness, no flank ecchymosis  Abdomen soft  Cholecystectomy and hernia repair scars well healed   Musculoskeletal: Normal range of motion. She exhibits edema.   Neurological: She is alert.   Skin: Skin is warm and dry. She is not diaphoretic.     Psychiatric: She has a normal mood and affect.       Significant Labs:    BMP:    Recent Labs  Lab 07/17/18  0459 07/17/18  1713 07/18/18  0345    141 138   K 3.9 3.8 4.5   * 110 107   CO2 20* 24 22*   BUN 26* 21 21   CREATININE 1.2 1.0 0.9   CALCIUM 8.8 8.6* 9.0       CBC:     Recent Labs  Lab 07/17/18  1713 07/17/18  2347 07/18/18  0345   WBC 6.98 8.50 8.14   HGB 8.0* 8.8* 9.2*   HCT 25.2* 26.5* 27.7*   PLT 62* 55* 58*       All pertinent labs results from the past 24 hours have been reviewed.    Significant Imaging:  All pertinent imaging results/findings from the past 24 hours have been reviewed.                  Assessment/Plan:     * Perinephric hematoma    - Patient is hemodynamically stable s/p 7 units PRBCs, 2FFP, 4 Platelets  - hemoglobin appears to be stabilizing--continue CBCs q 8 hours   - maintain INR at normal level   - continue PBRC, FFP, platelet transfusions as needed   - recommend incentive spirometry   - OOB, voiding spontaneously  - management of diarrhea per primary   - no surgical intervention at this time-- okay for diet  - no further imaging needed at this time, negative CTA 7/15  - please call with questions or concerns.             She can ambulate once she is stepped down to floor    VTE Risk Mitigation         Ordered     Place MARYJO hose  Until discontinued      07/13/18 1500     IP VTE LOW RISK PATIENT  Once      07/13/18 1500          Martinez Reese MD  Urology  [facility]

## 2018-07-18 NOTE — ASSESSMENT & PLAN NOTE
- 7/14: Perinephric hematoma measuring [id number] with evidence of mass effect on right kidney as well as free fluid noted in pelvis on CT scan (unknown how this compares to outside CT as no records/CT scan were available to MICU team - Urology has previous CT in there possession and are following patient. Appreciate their recs.  - 7/15: Repeat CTA with grossly stable size of hematoma  - H/H Now stable s/p 7 units PRBCs, 2FFP, 4 Platelets (last transfused 7/15)  - Trend CBC qd

## 2018-07-18 NOTE — CARE UPDATE
-Spoke with Frank with hospital medicine at 1:30 pm  -Cleared for stepdown to hospital medicine.   -Handoff note completed.   -Transfer orders completed.     Eric Lara MD  Internal Medicine Resident, PGY-1

## 2018-07-18 NOTE — ASSESSMENT & PLAN NOTE
- Patient is hemodynamically stable s/p 7 units PRBCs, 2FFP, 4 Platelets  - hemoglobin appears to be stabilizing--continue CBCs q 8 hours   - maintain INR at normal level   - continue PBRC, FFP, platelet transfusions as needed   - recommend incentive spirometry   - OOB, voiding spontaneously  - management of diarrhea per primary   - no surgical intervention at this time-- okay for diet  - no further imaging needed at this time, negative CTA 7/15  - please call with questions or concerns.

## 2018-07-18 NOTE — NURSING TRANSFER
Nursing Transfer Note      7/18/2018     Transfer {TRANSFER TO/FROM:87878}: ***    Transfer via {TRANSFER VIA:23389}    Transfer with {TRANSFER WITH:59120}    Transported by ***    Medicines sent: ***    Chart send with patient: {YES (DEF)/NO:03353}    Notified: {NOTIFIED:91412}    Patient reassessed at: *** (date, time)    Upon arrival to floor: {IP NSG TRANSFER ARRIVAL [facility]:58604}

## 2018-07-18 NOTE — PROVIDER TRANSFER
Hospital Medicine Acceptance Note    Date of Admit: 7/13/2018  Date of Transfer / Stepdown: 7/18/2018    Brief History of Present Illness and Hospital Course:      Emiliana Persaud is a 62 y.o. female who  has a past medical history of Acute DVT (deep venous thrombosis) (7/16/2018); Antiphospholipid antibody syndrome; Brain aneurysm; Cancer; GERD (gastroesophageal reflux disease); Migraine headache; and Thyroid disease.. The patient was transfer from [facility] in [place], [place] with concerns of a perinephric hematoma.    Patient admitted to [facility] 7/13/18 for perinephric hematoma. See  notes for details. Patient ultimately was stepped up to ICU 7/14/18 for hypotension despite multiple transfusions (Total Blood Products Received Throughout Admission to Date: 7 units PRBCs, 2FFP, 4 Platelets (last transfused 7/15)). IVC filter placed 7/18. See ICU notes for further hospital course and plan of care.     Patient stepped down to hospital medicine 7/18/2018 for ongoing management of perinephric hematoma with supratherapeutic INR. Patient with antiphospholipid syndrome and was on coumadin at home but held for now. IVC filter placed 7/18. INR 1.1. Urology was following and no intervention at this time. Continue trending CBC. PT/OT recommending HH.     Bekah Kurtz PA-C  [facility] Medicine

## 2018-07-18 NOTE — RESIDENT HANDOFF
Handoff     Primary Team: [facility] [place] [facility] Room Number: 26165/25101 A     Patient Name: Emiliana Persaud MRN: [id number]     Date of Birth: [id number] Allergies: Bactrim [sulfamethoxazole-trimethoprim] and Iodine and iodide containing products     Age: 62 y.o. Admit Date: 7/13/2018     Sex: female  BMI: Body mass index is 36.37 kg/m².     Code Status: Full Code          Reason for Admission: Perinephric hematoma    Brief HPI : Patient is a 61 yo F with significant past medical history of antiphospholipid syndrome (On Coumadin), DVT, thyroid cancer, IBS, brain aneurysm, and chronic migraines who presents from [facility] ED in [place], [place] with concerns of a perinephric hematoma.     Patient states that the pain began 7/12 at 11 pm. It progressively got worse. She did not try anything for the pain. The pain has been constant. It radiates to the right abdomen. Patient endorsed hematuria, HA, nausea, thirst, dry mouth. he denies chest pain, SOB. CT at OSH showed perinephric hematoma. Outside labs show INR of 2.6.  OSH labs wre WBC/10.9, H/H 13.1/37.6  One unit of blood was given in route to [facility]. Patient received morphine, dilaudid, and phenergen at outside hospital.      Patient completed 2 blood transfusions in [facility] ED.  At that time, patient was admitted to hospital medicine.  Hg was 10.7 s/p 2 U.  Urology and IR consulted for retroperitoneal bleed.  They stated that there was intervention needed at this time.  In the evening of 7/13, code response team was called due to patient feeling lethargic, weak, and diaphoretic. Team went to see patient. Extremely lethargic but easily aroused.  Oriented X4 with no motor deficits noted.  BS performed revealed a BS of 258.   BP 94/65 RR 12 O2 94% on RA.  Placed on 2L NC.  Labs including POCT glucose, ABG, lactic acid, CMP, CBC, PT/INR were ordered. Blood pressure started dropping: SBP running in the mid 90s and DBP in the mid 40s-low 50s. Bolus of saline  and FFP were ordered and administered.     Hospital Course :   07/14 - 61 yo F with PMHx Antiphospholipid Syndrome, RLE DVT (resolved), right acoustic neuroma, thyroid ca, brain aneurysm admitted from OSH with spontaneous perinephric hematoma (11.5 x 14 x 18 cm) as denies trauma or recent surgical intervention. INR 5 on admission, s/p 3u PRBCs total.    - Due to dropping blood pressures (SBP in mid 80s with DBP in mid 50s) admitted to ICU   -since admit to the ICU she has had adequate BP with SBP >120 and MAPs >65  Urology and IR consulted and following.  07/15 - Multiple blood products transfused. CTA was unable to appreciate any active bleeding.  07/16 - Bilateral LE Ultrasound negative for DVT. Discussed IVC Filter placement  07/17 - H/H Stable. No blood products given  07/18 - IVC Filter Placed without incident.     * Total Blood Products Received Throughout Admission to Date: 7 units PRBCs, 2FFP, 4 Platelets (last transfused 7/15)  * Pt reports hx of severe anaphylactic reaction to contrast dye    Tasks :   - PT S/P IVC filter placement today  - Pt required substantial blood products to maintain appropriate H/H and Platelets - Continue to trend  - CT and CXR consistent with some (R) lung compression atelectasis and pleural effusion. Pt initially SOB, which has improved. Pt has been requiring 3-4 L NC for symptomatic relief. Continue to manage pt's breathing and wean O2 requirements appropriately.  Consider incentive spirometer use.   - Pt reports new onset constipation (f/u BM s/p colace this am)  - Urology following. F/U on their recommendations  - Pt presented with MAURO (improving). Continue to monitor      Mentored By: Ayan Figueroa MD

## 2018-07-18 NOTE — ASSESSMENT & PLAN NOTE
-patients Cr increased to 1.4 and then 1.6 (no kidney issues on baseline)  -most likely 2/2 to hypoperfusion  - 7/18 - Cr 1.2

## 2018-07-18 NOTE — SUBJECTIVE & OBJECTIVE
Interval History/Significant Events: No acute event overnight. Pain is overall improving. Pt reports constipation. Colace ordered. Stepdown to hospital medicine today.     Review of Systems   Constitutional: Negative for chills and diaphoresis.   HENT: Negative for nosebleeds and sore throat.    Eyes: Negative for discharge and visual disturbance.   Respiratory: Negative for chest tightness, shortness of breath and wheezing.    Cardiovascular: Negative for chest pain and leg swelling.   Gastrointestinal: Positive for constipation. Negative for abdominal pain and blood in stool.   Endocrine: Negative for cold intolerance and heat intolerance.   Genitourinary: Negative for difficulty urinating, hematuria and urgency.   Musculoskeletal: Negative for myalgias and neck stiffness.   Skin: Negative for color change and wound.   Neurological: Negative for dizziness and facial asymmetry.   Hematological: Negative for adenopathy. Does not bruise/bleed easily.   Psychiatric/Behavioral: Negative for decreased concentration and suicidal ideas.     Objective:     Vital Signs (Most Recent):  Temp: 98.3 °F (36.8 °C) (07/18/18 1100)  Pulse: 79 (07/18/18 1200)  Resp: (!) 23 (07/18/18 1200)  BP: (!) 154/72 (07/18/18 1200)  SpO2: (!) 94 % (07/18/18 1200) Vital Signs (24h Range):  Temp:  [98.3 °F (36.8 °C)-100.8 °F (38.2 °C)] 98.3 °F (36.8 °C)  Pulse:  [] 79  Resp:  [20-29] 23  SpO2:  [89 %-100 %] 94 %  BP: (120-158)/(58-87) 154/72   Weight: 108.5 kg (239 lb 3.2 oz)  Body mass index is 36.37 kg/m².      Intake/Output Summary (Last 24 hours) at 07/18/18 1256  Last data filed at 07/18/18 1200   Gross per 24 hour   Intake                0 ml   Output             1400 ml   Net            -1400 ml       Physical Exam   Constitutional: She is oriented to person, place, and time. She appears well-developed and well-nourished. No distress.   HENT:   Head: Normocephalic and atraumatic.   Eyes: Conjunctivae are normal. Pupils are equal,  round, and reactive to light.   Neck: Normal range of motion. No thyromegaly present.   Cardiovascular: Normal rate, regular rhythm and normal heart sounds.  Exam reveals no gallop and no friction rub.    No murmur heard.  Pulmonary/Chest: Effort normal and breath sounds normal.   Abdominal: Soft. There is no tenderness.   Musculoskeletal: Normal range of motion. She exhibits no deformity.   Neurological: She is alert and oriented to person, place, and time. No cranial nerve deficit.   Skin: Skin is warm and dry. Capillary refill takes less than 2 seconds. She is not diaphoretic.   Psychiatric: She has a normal mood and affect.   Nursing note and vitals reviewed.      Vents:  Oxygen Concentration (%): 28 (07/13/18 2329)  Lines/Drains/Airways     Peripheral Intravenous Line                 Midline Catheter Insertion/Assessment  - Single Lumen 07/17/18 1130 Right basilic vein (medial side of arm) 18g x 10cm 1 day         Peripheral IV - Single Lumen 07/17/18 2135 Left Forearm less than 1 day              Significant Labs:    CBC/Anemia Profile:    Recent Labs  Lab 07/17/18  1713 07/17/18  2347 07/18/18  0345   WBC 6.98 8.50 8.14   HGB 8.0* 8.8* 9.2*   HCT 25.2* 26.5* 27.7*   PLT 62* 55* 58*   MCV 94 90 91   RDW 14.4 13.9 13.5        Chemistries:    Recent Labs  Lab 07/17/18  0440 07/17/18  0459 07/17/18  1713 07/18/18  0345   NA  --  141 141 138   K  --  3.9 3.8 4.5   CL  --  112* 110 107   CO2  --  20* 24 22*   BUN  --  26* 21 21   CREATININE  --  1.2 1.0 0.9   CALCIUM  --  8.8 8.6* 9.0   ALBUMIN  --  2.6* 2.4* 2.4*   PROT  --  6.1 5.9* 6.3   BILITOT  --  2.4* 2.4* 2.5*   ALKPHOS  --  97 107 121   ALT  --  107* 92* 87*   AST  --  66* 53* 49*   MG 2.5  --   --  2.3   PHOS 2.7  --   --  3.6       Recent Lab Results       07/18/18  0345 07/17/18  2347 07/17/18  1818 07/17/18  1713      Immature Granulocytes 0.9(H) 0.7(H)  0.6(H)     Immature Grans (Abs) 0.07  Comment:  Mild elevation in immature granulocytes is non  specific and   can be seen in a variety of conditions including stress response,   acute inflammation, trauma and pregnancy. Correlation with other   laboratory and clinical findings is essential.  (H) 0.06  Comment:  Mild elevation in immature granulocytes is non specific and   can be seen in a variety of conditions including stress response,   acute inflammation, trauma and pregnancy. Correlation with other   laboratory and clinical findings is essential.  (H)  0.04  Comment:  Mild elevation in immature granulocytes is non specific and   can be seen in a variety of conditions including stress response,   acute inflammation, trauma and pregnancy. Correlation with other   laboratory and clinical findings is essential.       Albumin 2.4(L)   2.4(L)     Alkaline Phosphatase 121   107     ALT 87(H)   92(H)     Anion Gap 9   7(L)     Aniso  Slight       aPTT 44.6  Comment:  aPTT therapeutic range = 39-69 seconds(H)        AST 49(H)   53(H)     Baso # 0.00 0.01  0.01     Basophilic Stippling  Occasional       Basophil% 0.0 0.1  0.1     Total Bilirubin 2.5  Comment:  For infants and newborns, interpretation of results should be based  on gestational age, weight and in agreement with clinical  observations.  Premature Infant recommended reference ranges:  Up to 24 hours.............<8.0 mg/dL  Up to 48 hours............<12.0 mg/dL  3-5 days..................<15.0 mg/dL  6-29 days.................<15.0 mg/dL  (H)   2.4  Comment:  For infants and newborns, interpretation of results should be based  on gestational age, weight and in agreement with clinical  observations.  Premature Infant recommended reference ranges:  Up to 24 hours.............<8.0 mg/dL  Up to 48 hours............<12.0 mg/dL  3-5 days..................<15.0 mg/dL  6-29 days.................<15.0 mg/dL  (H)     Blood Culture, Routine   No Growth to date[P]         No Growth to date[P]      BUN, Bld 21   21     Carlie Cells  Occasional       Calcium 9.0    8.6(L)     Chloride 107   110     CO2 22(L)   24     Creatinine 0.9   1.0     Differential Method Automated Automated  Automated     eGFR if  >60.0   >60.0     eGFR if non  >60.0  Comment:  Calculation used to obtain the estimated glomerular filtration  rate (eGFR) is the CKD-EPI equation.      >60.0  Comment:  Calculation used to obtain the estimated glomerular filtration  rate (eGFR) is the CKD-EPI equation.        Eos # 0.0 0.0  0.1     Eosinophil% 0.0 0.1  0.9     Glucose 119(H)   98     Gran # (ANC) 7.0 7.2  5.2     Gran% 86.0(H) 84.6(H)  74.0(H)     Hematocrit 27.7(L) 26.5(L)  25.2(L)     Hemoglobin 9.2(L) 8.8(L)  8.0(L)     Coumadin Monitoring INR 1.1  Comment:  Coumadin Therapy:  2.0 - 3.0 for INR for all indicators except mechanical heart valves  and antiphospholipid syndromes which should use 2.5 - 3.5.          Lymph # 0.4(L) 0.4(L)  0.7(L)     Lymph% 5.4(L) 4.9(L)  9.9(L)     Magnesium 2.3        MCH 30.2 29.9  29.7     MCHC 33.2 33.2  31.7(L)     MCV 91 90  94     Mono # 0.6 0.8  1.0     Mono% 7.7 9.6  14.5     MPV 11.2 10.5  10.5     nRBC 0 0  0     Phosphorus 3.6        Platelet Estimate  Decreased(A)       Platelets 58(L) 55(L)  62(L)     Poik  Slight       Poly  Occasional       Potassium 4.5   3.8     Total Protein 6.3   5.9(L)     Protime 11.7        RBC 3.05(L) 2.94(L)  2.69(L)     RDW 13.5 13.9  14.4     Sodium 138   141     WBC 8.14 8.50  6.98         All pertinent labs within the past 24 hours have been reviewed.    Significant Imaging:  I have reviewed all pertinent imaging results/findings within the past 24 hours.

## 2018-07-18 NOTE — PROCEDURES
"Radiology Post-Procedure Note    Pre Op Diagnosis: Perinephric hematoma requiring abstaining from antithrombotics    Post Op Diagnosis: : same    Procedure: IVC filter placement    Procedure performed by: Nathan    Written Informed Consent Obtained: Yes    Specimen Removed: NO    Estimated Blood Loss: Minimal    Findings: After written informed consent and sterile prep and drape, the right internal jugular vein was cannulated and acatheter was placed into the IVC.  Contrast injection under fluoroscopy revealed anatomy suitable for placement of IVC filter immediately inferior to the renal veins.  The filter was placed at this level under fluoroscopic surveillance and post placement venogram demonstrated adequate position and function.  Please see full procedural report in Imaging for further details.       Patient tolerated procedure well.    Jesus Campuzano MD ([facility])  Radiology PGY-5  [phone]      "

## 2018-07-19 LAB
ALBUMIN SERPL BCP-MCNC: 1.9 G/DL
ALBUMIN SERPL BCP-MCNC: 2.3 G/DL
ALP SERPL-CCNC: 102 U/L
ALP SERPL-CCNC: 105 U/L
ALT SERPL W/O P-5'-P-CCNC: 58 U/L
ALT SERPL W/O P-5'-P-CCNC: 58 U/L
ANION GAP SERPL CALC-SCNC: 6 MMOL/L
ANION GAP SERPL CALC-SCNC: 7 MMOL/L
APTT BLDCRRT: 43.1 SEC
AST SERPL-CCNC: 28 U/L
AST SERPL-CCNC: 32 U/L
BACTERIA BLD CULT: NORMAL
BACTERIA BLD CULT: NORMAL
BASOPHILS # BLD AUTO: 0.01 K/UL
BASOPHILS NFR BLD: 0.1 %
BILIRUB SERPL-MCNC: 2.4 MG/DL
BILIRUB SERPL-MCNC: 2.9 MG/DL
BUN SERPL-MCNC: 21 MG/DL
BUN SERPL-MCNC: 24 MG/DL
CALCIUM SERPL-MCNC: 8.8 MG/DL
CALCIUM SERPL-MCNC: 9 MG/DL
CHLORIDE SERPL-SCNC: 105 MMOL/L
CHLORIDE SERPL-SCNC: 107 MMOL/L
CO2 SERPL-SCNC: 26 MMOL/L
CO2 SERPL-SCNC: 27 MMOL/L
CREAT SERPL-MCNC: 0.8 MG/DL
CREAT SERPL-MCNC: 0.8 MG/DL
DIFFERENTIAL METHOD: ABNORMAL
EOSINOPHIL # BLD AUTO: 0 K/UL
EOSINOPHIL NFR BLD: 0.1 %
ERYTHROCYTE [DISTWIDTH] IN BLOOD BY AUTOMATED COUNT: 13.4 %
EST. GFR  (AFRICAN AMERICAN): >60 ML/MIN/1.73 M^2
EST. GFR  (AFRICAN AMERICAN): >60 ML/MIN/1.73 M^2
EST. GFR  (NON AFRICAN AMERICAN): >60 ML/MIN/1.73 M^2
EST. GFR  (NON AFRICAN AMERICAN): >60 ML/MIN/1.73 M^2
GLUCOSE SERPL-MCNC: 106 MG/DL
GLUCOSE SERPL-MCNC: 90 MG/DL
HCT VFR BLD AUTO: 27.1 %
HGB BLD-MCNC: 9 G/DL
IMM GRANULOCYTES # BLD AUTO: 0.14 K/UL
IMM GRANULOCYTES NFR BLD AUTO: 1.4 %
INR PPP: 1.3
LYMPHOCYTES # BLD AUTO: 1 K/UL
LYMPHOCYTES NFR BLD: 10.1 %
MAGNESIUM SERPL-MCNC: 2.1 MG/DL
MCH RBC QN AUTO: 29.8 PG
MCHC RBC AUTO-ENTMCNC: 33.2 G/DL
MCV RBC AUTO: 90 FL
MONOCYTES # BLD AUTO: 1.3 K/UL
MONOCYTES NFR BLD: 13.7 %
NEUTROPHILS # BLD AUTO: 7.3 K/UL
NEUTROPHILS NFR BLD: 74.6 %
NRBC BLD-RTO: 0 /100 WBC
PHOSPHATE SERPL-MCNC: 2.8 MG/DL
PLATELET # BLD AUTO: 70 K/UL
PMV BLD AUTO: 11.3 FL
POTASSIUM SERPL-SCNC: 3.6 MMOL/L
POTASSIUM SERPL-SCNC: 3.7 MMOL/L
PROT SERPL-MCNC: 5.9 G/DL
PROT SERPL-MCNC: 6 G/DL
PROTHROMBIN TIME: 12.7 SEC
RBC # BLD AUTO: 3.02 M/UL
SODIUM SERPL-SCNC: 139 MMOL/L
SODIUM SERPL-SCNC: 139 MMOL/L
WBC # BLD AUTO: 9.75 K/UL

## 2018-07-19 PROCEDURE — 84100 ASSAY OF PHOSPHORUS: CPT

## 2018-07-19 PROCEDURE — 94761 N-INVAS EAR/PLS OXIMETRY MLT: CPT

## 2018-07-19 PROCEDURE — 83735 ASSAY OF MAGNESIUM: CPT

## 2018-07-19 PROCEDURE — 27000221 HC OXYGEN, UP TO 24 HOURS

## 2018-07-19 PROCEDURE — 25000003 PHARM REV CODE 250: Performed by: STUDENT IN AN ORGANIZED HEALTH CARE EDUCATION/TRAINING PROGRAM

## 2018-07-19 PROCEDURE — 99233 SBSQ HOSP IP/OBS HIGH 50: CPT | Mod: ,,, | Performed by: INTERNAL MEDICINE

## 2018-07-19 PROCEDURE — 11000001 HC ACUTE MED/SURG PRIVATE ROOM

## 2018-07-19 PROCEDURE — 85025 COMPLETE CBC W/AUTO DIFF WBC: CPT

## 2018-07-19 PROCEDURE — 85610 PROTHROMBIN TIME: CPT

## 2018-07-19 PROCEDURE — 80053 COMPREHEN METABOLIC PANEL: CPT

## 2018-07-19 PROCEDURE — 85730 THROMBOPLASTIN TIME PARTIAL: CPT

## 2018-07-19 RX ORDER — CHOLECALCIFEROL (VITAMIN D3) 25 MCG
1000 TABLET ORAL DAILY
COMMUNITY

## 2018-07-19 RX ORDER — CALCIUM CARBONATE 500(1250)
1 TABLET ORAL DAILY
COMMUNITY

## 2018-07-19 RX ORDER — SERTRALINE HYDROCHLORIDE 50 MG/1
50 TABLET, FILM COATED ORAL DAILY
COMMUNITY

## 2018-07-19 RX ORDER — POTASSIUM CHLORIDE 750 MG/1
99 TABLET, EXTENDED RELEASE ORAL DAILY
Status: ON HOLD | COMMUNITY
End: 2018-07-21 | Stop reason: HOSPADM

## 2018-07-19 RX ORDER — DOCUSATE SODIUM 100 MG/1
100 CAPSULE, LIQUID FILLED ORAL DAILY
Status: DISCONTINUED | OUTPATIENT
Start: 2018-07-20 | End: 2018-07-21 | Stop reason: HOSPADM

## 2018-07-19 RX ORDER — BUTALBITAL, ASPIRIN, AND CAFFEINE 325; 50; 40 MG/1; MG/1; MG/1
1 CAPSULE ORAL EVERY 6 HOURS PRN
COMMUNITY

## 2018-07-19 RX ORDER — PROPRANOLOL HYDROCHLORIDE 40 MG/1
40 TABLET ORAL 2 TIMES DAILY
COMMUNITY

## 2018-07-19 RX ORDER — GLUCOSAMINE/CHONDR SU A SOD 167-133 MG
100 CAPSULE ORAL DAILY
COMMUNITY

## 2018-07-19 RX ORDER — GLUCOSAMINE/CHONDRO SU A 500-400 MG
1 TABLET ORAL DAILY
COMMUNITY

## 2018-07-19 RX ORDER — LEVOTHYROXINE SODIUM 150 UG/1
150 TABLET ORAL DAILY
COMMUNITY

## 2018-07-19 RX ORDER — B-COMPLEX WITH VITAMIN C
1 TABLET ORAL DAILY
COMMUNITY

## 2018-07-19 RX ORDER — ATORVASTATIN CALCIUM 40 MG/1
40 TABLET, FILM COATED ORAL DAILY
COMMUNITY

## 2018-07-19 RX ORDER — WARFARIN SODIUM 5 MG/1
5 TABLET ORAL DAILY
Status: ON HOLD | COMMUNITY
End: 2018-07-21 | Stop reason: HOSPADM

## 2018-07-19 RX ORDER — ESOMEPRAZOLE MAGNESIUM 40 MG/1
40 CAPSULE, DELAYED RELEASE ORAL DAILY
COMMUNITY

## 2018-07-19 RX ORDER — UBIDECARENONE 30 MG
30 CAPSULE ORAL DAILY
COMMUNITY

## 2018-07-19 RX ORDER — AMOXICILLIN 500 MG
1 CAPSULE ORAL DAILY
COMMUNITY

## 2018-07-19 RX ORDER — PROMETHAZINE HYDROCHLORIDE 12.5 MG/1
12.5 TABLET ORAL EVERY 6 HOURS PRN
COMMUNITY

## 2018-07-19 RX ADMIN — ACETAMINOPHEN 650 MG: 325 TABLET, FILM COATED ORAL at 10:07

## 2018-07-19 RX ADMIN — ACETAMINOPHEN 650 MG: 325 TABLET, FILM COATED ORAL at 12:07

## 2018-07-19 RX ADMIN — TRAMADOL HYDROCHLORIDE 50 MG: 50 TABLET, COATED ORAL at 07:07

## 2018-07-19 RX ADMIN — ONDANSETRON 8 MG: 8 TABLET, ORALLY DISINTEGRATING ORAL at 11:07

## 2018-07-19 RX ADMIN — LEVOTHYROXINE SODIUM 150 MCG: 75 TABLET ORAL at 05:07

## 2018-07-19 RX ADMIN — ACETAMINOPHEN 650 MG: 325 TABLET, FILM COATED ORAL at 06:07

## 2018-07-19 RX ADMIN — PANTOPRAZOLE SODIUM 40 MG: 40 TABLET, DELAYED RELEASE ORAL at 09:07

## 2018-07-19 NOTE — PROGRESS NOTES
[facility]  Urology  Progress Note    Patient Name: Emiliana Persaud  MRN: [id number]  Admission Date: 7/13/2018  Hospital Length of Stay: 6 days  Code Status: Full Code   Attending Provider: Allyssa Macias MD  Primary Care Physician: [facility]    Subjective:     HPI:  63yo F with history of antiphospholipid syndrome on warfarin who was brought to the ED as a transfer from [facility] for perinephric hematoma. Per her family, she woke in the middle of the night with severe right flank and abdominal pain. In the ED she was found to have a perinephric hematoma and an INR of 2.5. She was transferred to [facility] ED for further evaluation. When she arrived to [facility] ED she had a Hgb of 11 and an INR of 5.0. Her vital signs were stable. She was currently receiving a 2nd unit of PRBCs in the ED.     She has no urologic surgical history. She has had a partial thyroidectomy, cholecystectomy with incisional hernia (s/p mesh repair) and radiation for an auditory nerve tumor. Her main complaints are fatigue and abdominal and flank pain.     Interval History:   NAEON  H/H has been stable  Denies abdominal pain  No difficulty breathing  States she feels much better    Review of Systems  Objective:     Temp:  [98.3 °F (36.8 °C)-100.2 °F (37.9 °C)] 99.4 °F (37.4 °C)  Pulse:  [69-96] 70  Resp:  [17-29] 20  SpO2:  [89 %-100 %] 98 %  BP: (114-158)/(53-85) 135/67     Body mass index is 36.37 kg/m².       Bladder Scan Volume (mL): 200 mL (07/13/18 2115)    Drains          No matching active lines, drains, or airways          Physical Exam   Constitutional: She appears well-developed. No distress.   HENT:   Head: Normocephalic and atraumatic.   Eyes: Conjunctivae are normal. Pupils are equal, round, and reactive to light. No scleral icterus.   Neck: Normal range of motion.   Cardiovascular: Normal rate and intact distal pulses.    Pulmonary/Chest: Effort normal. No respiratory distress.   Abdominal: Soft. She  exhibits no distension. There is tenderness. There is no rebound and no guarding.   Mild right upper and lower quadrant tenderness  No left sided tenderness  right flank tenderness, no flank ecchymosis  Abdomen soft  Cholecystectomy and hernia repair scars well healed   Musculoskeletal: Normal range of motion. She exhibits edema.   Neurological: She is alert.   Skin: Skin is warm and dry. She is not diaphoretic.     Psychiatric: She has a normal mood and affect.       Significant Labs:    BMP:    Recent Labs  Lab 07/17/18  1713 07/18/18  0345 07/19/18  0405    138 139   K 3.8 4.5 3.7    107 107   CO2 24 22* 26   BUN 21 21 24*   CREATININE 1.0 0.9 0.8   CALCIUM 8.6* 9.0 8.8       CBC:     Recent Labs  Lab 07/17/18  2347 07/18/18  0345 07/19/18  0405   WBC 8.50 8.14 9.75   HGB 8.8* 9.2* 9.0*   HCT 26.5* 27.7* 27.1*   PLT 55* 58* 70*                     Assessment/Plan:     * Perinephric hematoma    - Patient is hemodynamically stable s/p 7 units PRBCs, 2FFP, 4 Platelets  - H/H stable, has not dropped in the last couple days.   - maintain INR at normal level   - continue PBRC, FFP, platelet transfusions as needed   - recommend incentive spirometry   - OOB, voiding spontaneously  - no surgical intervention at this time-- okay for diet  - no further imaging needed at this time, negative CTA 7/15    - please call with questions or concerns. We will sign off at this time.               VTE Risk Mitigation         Ordered     Place MARYJO hose  Until discontinued      07/13/18 1500     IP VTE LOW RISK PATIENT  Once      07/13/18 1500          Rodrigue Rodriguez MD  Urology  [facility]

## 2018-07-19 NOTE — PLAN OF CARE
Problem: Patient Care Overview  Goal: Plan of Care Review  Outcome: Ongoing (interventions implemented as appropriate)  Plan of care reviewed. Pt lying in bed co headache. Tylenol given, MD notified dt persistent HA. Dressing to R neck clean dry intact. No distress noted

## 2018-07-19 NOTE — SUBJECTIVE & OBJECTIVE
Interval History:   GATO  H/H has been stable  Denies abdominal pain  No difficulty breathing  States she feels much better    Review of Systems  Objective:     Temp:  [98.3 °F (36.8 °C)-100.2 °F (37.9 °C)] 99.4 °F (37.4 °C)  Pulse:  [69-96] 70  Resp:  [17-29] 20  SpO2:  [89 %-100 %] 98 %  BP: (114-158)/(53-85) 135/67     Body mass index is 36.37 kg/m².       Bladder Scan Volume (mL): 200 mL (07/13/18 2115)    Drains          No matching active lines, drains, or airways          Physical Exam   Constitutional: She appears well-developed. No distress.   HENT:   Head: Normocephalic and atraumatic.   Eyes: Conjunctivae are normal. Pupils are equal, round, and reactive to light. No scleral icterus.   Neck: Normal range of motion.   Cardiovascular: Normal rate and intact distal pulses.    Pulmonary/Chest: Effort normal. No respiratory distress.   Abdominal: Soft. She exhibits no distension. There is tenderness. There is no rebound and no guarding.   Mild right upper and lower quadrant tenderness  No left sided tenderness  right flank tenderness, no flank ecchymosis  Abdomen soft  Cholecystectomy and hernia repair scars well healed   Musculoskeletal: Normal range of motion. She exhibits edema.   Neurological: She is alert.   Skin: Skin is warm and dry. She is not diaphoretic.     Psychiatric: She has a normal mood and affect.       Significant Labs:    BMP:    Recent Labs  Lab 07/17/18  1713 07/18/18  0345 07/19/18  0405    138 139   K 3.8 4.5 3.7    107 107   CO2 24 22* 26   BUN 21 21 24*   CREATININE 1.0 0.9 0.8   CALCIUM 8.6* 9.0 8.8       CBC:     Recent Labs  Lab 07/17/18  2347 07/18/18  0345 07/19/18  0405   WBC 8.50 8.14 9.75   HGB 8.8* 9.2* 9.0*   HCT 26.5* 27.7* 27.1*   PLT 55* 58* 70*

## 2018-07-19 NOTE — SUBJECTIVE & OBJECTIVE
Interval History/Significant Events: No acute events overnight. Patient was for stepdown yesterday awaiting bed placement. Pt moved to medicine floor.     Review of Systems   Constitutional: Negative for chills and diaphoresis.   HENT: Negative for nosebleeds and sore throat.    Eyes: Negative for discharge and visual disturbance.   Respiratory: Negative for chest tightness, shortness of breath and wheezing.    Cardiovascular: Negative for chest pain and leg swelling.   Gastrointestinal: Negative for abdominal pain and blood in stool.   Endocrine: Negative for cold intolerance and heat intolerance.   Genitourinary: Negative for hematuria and urgency.   Musculoskeletal: Negative for back pain, neck pain and neck stiffness.        (R) Shoulder soreness   Skin: Negative for color change and wound.   Neurological: Negative for dizziness and facial asymmetry.   Hematological: Negative for adenopathy. Does not bruise/bleed easily.   Psychiatric/Behavioral: Negative for decreased concentration and suicidal ideas.     Objective:     Vital Signs (Most Recent):  Temp: 99.1 °F (37.3 °C) (07/19/18 1213)  Pulse: 88 (07/19/18 1213)  Resp: 18 (07/19/18 1213)  BP: (!) 146/67 (07/19/18 1213)  SpO2: (!) 93 % (07/19/18 1213) Vital Signs (24h Range):  Temp:  [98.9 °F (37.2 °C)-100.2 °F (37.9 °C)] 99.1 °F (37.3 °C)  Pulse:  [] 88  Resp:  [17-30] 18  SpO2:  [87 %-100 %] 93 %  BP: (114-158)/(53-78) 146/67   Weight: 108.5 kg (239 lb 3.2 oz)  Body mass index is 36.37 kg/m².      Intake/Output Summary (Last 24 hours) at 07/19/18 1420  Last data filed at 07/19/18 0400   Gross per 24 hour   Intake                0 ml   Output              600 ml   Net             -600 ml       Physical Exam   Constitutional: She is oriented to person, place, and time. She appears well-developed and well-nourished. No distress.   HENT:   Head: Normocephalic and atraumatic.   Eyes: Conjunctivae and EOM are normal. Pupils are equal, round, and reactive to  light.   Neck: Normal range of motion. No thyromegaly present.   Cardiovascular: Normal rate, regular rhythm and normal heart sounds.  Exam reveals no gallop and no friction rub.    No murmur heard.  Pulmonary/Chest: Effort normal and breath sounds normal.   Abdominal: Soft. There is no tenderness.   Musculoskeletal: Normal range of motion. She exhibits no deformity.   Neurological: She is alert and oriented to person, place, and time. No cranial nerve deficit. GCS eye subscore is 4. GCS verbal subscore is 5. GCS motor subscore is 6.   Skin: Skin is warm and dry. Capillary refill takes less than 2 seconds. She is not diaphoretic.   Psychiatric: She has a normal mood and affect. Her behavior is normal.   Nursing note and vitals reviewed.      Vents:  Oxygen Concentration (%): 28 (07/13/18 2329)  Lines/Drains/Airways     Peripheral Intravenous Line                 Midline Catheter Insertion/Assessment  - Single Lumen 07/17/18 1130 Right basilic vein (medial side of arm) 18g x 10cm 2 days         Peripheral IV - Single Lumen 07/17/18 2135 Left Forearm 1 day              Significant Labs:    CBC/Anemia Profile:    Recent Labs  Lab 07/17/18  2347 07/18/18  0345 07/19/18  0405   WBC 8.50 8.14 9.75   HGB 8.8* 9.2* 9.0*   HCT 26.5* 27.7* 27.1*   PLT 55* 58* 70*   MCV 90 91 90   RDW 13.9 13.5 13.4        Chemistries:    Recent Labs  Lab 07/17/18  1713 07/18/18  0345 07/19/18  0405    138 139   K 3.8 4.5 3.7    107 107   CO2 24 22* 26   BUN 21 21 24*   CREATININE 1.0 0.9 0.8   CALCIUM 8.6* 9.0 8.8   ALBUMIN 2.4* 2.4* 1.9*   PROT 5.9* 6.3 5.9*   BILITOT 2.4* 2.5* 2.4*   ALKPHOS 107 121 102   ALT 92* 87* 58*   AST 53* 49* 28   MG  --  2.3 2.1   PHOS  --  3.6 2.8       Recent Lab Results       07/19/18  0406 07/19/18  0405      Immature Granulocytes  1.4(H)     Immature Grans (Abs)  0.14  Comment:  Mild elevation in immature granulocytes is non specific and   can be seen in a variety of conditions including stress  response,   acute inflammation, trauma and pregnancy. Correlation with other   laboratory and clinical findings is essential.  (H)     Albumin  1.9(L)     Alkaline Phosphatase  102     ALT  58(H)     Anion Gap  6(L)     aPTT 43.1  Comment:  aPTT therapeutic range = 39-69 seconds(H)      AST  28     Baso #  0.01     Basophil%  0.1     Total Bilirubin  2.4  Comment:  For infants and newborns, interpretation of results should be based  on gestational age, weight and in agreement with clinical  observations.  Premature Infant recommended reference ranges:  Up to 24 hours.............<8.0 mg/dL  Up to 48 hours............<12.0 mg/dL  3-5 days..................<15.0 mg/dL  6-29 days.................<15.0 mg/dL  (H)     BUN, Bld  24(H)     Calcium  8.8     Chloride  107     CO2  26     Creatinine  0.8     Differential Method  Automated     eGFR if African American  >60.0     eGFR if non   >60.0  Comment:  Calculation used to obtain the estimated glomerular filtration  rate (eGFR) is the CKD-EPI equation.        Eos #  0.0     Eosinophil%  0.1     Glucose  106     Gran # (ANC)  7.3     Gran%  74.6(H)     Hematocrit  27.1(L)     Hemoglobin  9.0(L)     Coumadin Monitoring INR 1.3  Comment:  Coumadin Therapy:  2.0 - 3.0 for INR for all indicators except mechanical heart valves  and antiphospholipid syndromes which should use 2.5 - 3.5.  (H)      Lymph #  1.0     Lymph%  10.1(L)     Magnesium  2.1     MCH  29.8     MCHC  33.2     MCV  90     Mono #  1.3(H)     Mono%  13.7     MPV  11.3     nRBC  0     Phosphorus  2.8     Platelets  70(L)     Potassium  3.7     Total Protein  5.9(L)     Protime 12.7(H)      RBC  3.02(L)     RDW  13.4     Sodium  139     WBC  9.75         All pertinent labs within the past 24 hours have been reviewed.    Significant Imaging:  I have reviewed all pertinent imaging results/findings within the past 24 hours.

## 2018-07-19 NOTE — NURSING TRANSFER
Nursing Transfer Note      7/19/2018     Transfer To: 927B from [id number]    Transfer via bed    Transfer with  to O2, cardiac monitoring    Transported by RN, CT    Medicines sent: levothyroxine, promethazine, zofran    Chart send with patient: Yes    Notified: spouse, daughter    Patient reassessed at: 11:30 7/19/2018     Upon arrival to floor: cardiac monitor applied, patient oriented to room, call bell in reach and bed in lowest position

## 2018-07-19 NOTE — PROGRESS NOTES
[facility]  Critical Care Medicine  Progress Note    Patient Name: Emiliana Persaud  MRN: [id number]  Admission Date: 7/13/2018  Hospital Length of Stay: 6 days  Code Status: Full Code  Attending Provider: Ayan Figueroa MD  Primary Care Provider: [facility]   Principal Problem: Perinephric hematoma    Subjective:     HPI:   Patient is a 63 yo F with significant past medical history of antiphospholipid syndrome, thyroid cancer, IBS, brain aneurysm, and chronic migraines who presents from [facility] ED in [place], [place] with concerns of a perinephric hematoma.    Patient states that the pain began 7/12 at 11 pm. It progressively got worse. She did not try anything for the pain. The pain has been constant. It radiates to the right abdomen. Patient endorsed hematuria, HA, nausea, thirst, dry mouth. he denies chest pain, SOB. CT at OSH showed perinephric hematoma. Outside labs show INR of 2.6.  OSH labs wre WBC/10.9, H/H 13.1/37.6  One unit of blood was given in route to [facility]. Patient received morphine, dilaudid, and phenergen at outside hospital.     Patient completed 2 blood transfusions in [facility] ED.  At that time, patient was admitted to hospital medicine.  Hg was 10.7 s/p 2 U.  Urology and IR consulted for retroperitoneal bleed.  They stated that there was intervention needed at this time.  In the evening of 7/13, code response team was called due to patient feeling lethargic, weak, and diaphoretic. Team went to see patient. Extremely lethargic but easily aroused.  Oriented X4 with no motor deficits noted.  BS performed revealed a BS of 258.   BP 94/65 RR 12 O2 94% on RA.  Placed on 2L NC.  Labs including POCT glucose, ABG, lactic acid, CMP, CBC, PT/INR were ordered. Blood pressure started dropping: SBP running in the mid 90s and DBP in the mid 40s-low 50s. Bolus of saline and FFP were ordered and administered.     Consulted ICU for hypotension.      Hospital/ICU Course:  07/14 - 61 yo F with PMHx antiphospholipid syndrome, RLE DVT (resolved), right acoustic neuroma, thyroid ca, brain aneurysm admitted from OSH with spontaneous perinephric hematoma as denies trauma or recent surgical intervention. INR 5 on admission, s/p 3u PRBCs total.    - Due to dropping blood pressures (SBP in mid 80s with DBP in mid 50s) admitted to ICU   -since admit to the ICU she has had adequate BP with SBP >120 and MAPs >65  Urology and IR consulted overnight and following.  07/15 - Multiple blood products transfused throughout the day. CTA was unable to appreciate any active bleeding.  07/16 - Bilateral LE Ultrasound negative for DVT. Discussed IVC Filter placement  07/17 - H/H Stable. No blood products given  07/18 - IVC Filter Placed without incident.     Interval History/Significant Events: No acute events overnight. Patient was for stepdown yesterday awaiting bed placement. Pt moved to medicine floor.     Review of Systems   Constitutional: Negative for chills and diaphoresis.   HENT: Negative for nosebleeds and sore throat.    Eyes: Negative for discharge and visual disturbance.   Respiratory: Negative for chest tightness, shortness of breath and wheezing.    Cardiovascular: Negative for chest pain and leg swelling.   Gastrointestinal: Negative for abdominal pain and blood in stool.   Endocrine: Negative for cold intolerance and heat intolerance.   Genitourinary: Negative for hematuria and urgency.   Musculoskeletal: Negative for back pain, neck pain and neck stiffness.        (R) Shoulder soreness   Skin: Negative for color change and wound.   Neurological: Negative for dizziness and facial asymmetry.   Hematological: Negative for adenopathy. Does not bruise/bleed easily.   Psychiatric/Behavioral: Negative for decreased concentration and suicidal ideas.     Objective:     Vital Signs (Most Recent):  Temp: 99.1 °F (37.3 °C) (07/19/18 1213)  Pulse: 88 (07/19/18 1213)  Resp: 18  (07/19/18 1213)  BP: (!) 146/67 (07/19/18 1213)  SpO2: (!) 93 % (07/19/18 1213) Vital Signs (24h Range):  Temp:  [98.9 °F (37.2 °C)-100.2 °F (37.9 °C)] 99.1 °F (37.3 °C)  Pulse:  [] 88  Resp:  [17-30] 18  SpO2:  [87 %-100 %] 93 %  BP: (114-158)/(53-78) 146/67   Weight: 108.5 kg (239 lb 3.2 oz)  Body mass index is 36.37 kg/m².      Intake/Output Summary (Last 24 hours) at 07/19/18 1420  Last data filed at 07/19/18 0400   Gross per 24 hour   Intake                0 ml   Output              600 ml   Net             -600 ml       Physical Exam   Constitutional: She is oriented to person, place, and time. She appears well-developed and well-nourished. No distress.   HENT:   Head: Normocephalic and atraumatic.   Eyes: Conjunctivae and EOM are normal. Pupils are equal, round, and reactive to light.   Neck: Normal range of motion. No thyromegaly present.   Cardiovascular: Normal rate, regular rhythm and normal heart sounds.  Exam reveals no gallop and no friction rub.    No murmur heard.  Pulmonary/Chest: Effort normal and breath sounds normal.   Abdominal: Soft. There is no tenderness.   Musculoskeletal: Normal range of motion. She exhibits no deformity.   Neurological: She is alert and oriented to person, place, and time. No cranial nerve deficit. GCS eye subscore is 4. GCS verbal subscore is 5. GCS motor subscore is 6.   Skin: Skin is warm and dry. Capillary refill takes less than 2 seconds. She is not diaphoretic.   Psychiatric: She has a normal mood and affect. Her behavior is normal.   Nursing note and vitals reviewed.      Vents:  Oxygen Concentration (%): 28 (07/13/18 2329)  Lines/Drains/Airways     Peripheral Intravenous Line                 Midline Catheter Insertion/Assessment  - Single Lumen 07/17/18 1130 Right basilic vein (medial side of arm) 18g x 10cm 2 days         Peripheral IV - Single Lumen 07/17/18 2135 Left Forearm 1 day              Significant Labs:    CBC/Anemia Profile:    Recent Labs  Lab  07/17/18  2347 07/18/18  0345 07/19/18  0405   WBC 8.50 8.14 9.75   HGB 8.8* 9.2* 9.0*   HCT 26.5* 27.7* 27.1*   PLT 55* 58* 70*   MCV 90 91 90   RDW 13.9 13.5 13.4        Chemistries:    Recent Labs  Lab 07/17/18  1713 07/18/18  0345 07/19/18  0405    138 139   K 3.8 4.5 3.7    107 107   CO2 24 22* 26   BUN 21 21 24*   CREATININE 1.0 0.9 0.8   CALCIUM 8.6* 9.0 8.8   ALBUMIN 2.4* 2.4* 1.9*   PROT 5.9* 6.3 5.9*   BILITOT 2.4* 2.5* 2.4*   ALKPHOS 107 121 102   ALT 92* 87* 58*   AST 53* 49* 28   MG  --  2.3 2.1   PHOS  --  3.6 2.8       Recent Lab Results       07/19/18  0406 07/19/18  0405      Immature Granulocytes  1.4(H)     Immature Grans (Abs)  0.14  Comment:  Mild elevation in immature granulocytes is non specific and   can be seen in a variety of conditions including stress response,   acute inflammation, trauma and pregnancy. Correlation with other   laboratory and clinical findings is essential.  (H)     Albumin  1.9(L)     Alkaline Phosphatase  102     ALT  58(H)     Anion Gap  6(L)     aPTT 43.1  Comment:  aPTT therapeutic range = 39-69 seconds(H)      AST  28     Baso #  0.01     Basophil%  0.1     Total Bilirubin  2.4  Comment:  For infants and newborns, interpretation of results should be based  on gestational age, weight and in agreement with clinical  observations.  Premature Infant recommended reference ranges:  Up to 24 hours.............<8.0 mg/dL  Up to 48 hours............<12.0 mg/dL  3-5 days..................<15.0 mg/dL  6-29 days.................<15.0 mg/dL  (H)     BUN, Bld  24(H)     Calcium  8.8     Chloride  107     CO2  26     Creatinine  0.8     Differential Method  Automated     eGFR if African American  >60.0     eGFR if non   >60.0  Comment:  Calculation used to obtain the estimated glomerular filtration  rate (eGFR) is the CKD-EPI equation.        Eos #  0.0     Eosinophil%  0.1     Glucose  106     Gran # (ANC)  7.3     Gran%  74.6(H)     Hematocrit   27.1(L)     Hemoglobin  9.0(L)     Coumadin Monitoring INR 1.3  Comment:  Coumadin Therapy:  2.0 - 3.0 for INR for all indicators except mechanical heart valves  and antiphospholipid syndromes which should use 2.5 - 3.5.  (H)      Lymph #  1.0     Lymph%  10.1(L)     Magnesium  2.1     MCH  29.8     MCHC  33.2     MCV  90     Mono #  1.3(H)     Mono%  13.7     MPV  11.3     nRBC  0     Phosphorus  2.8     Platelets  70(L)     Potassium  3.7     Total Protein  5.9(L)     Protime 12.7(H)      RBC  3.02(L)     RDW  13.4     Sodium  139     WBC  9.75         All pertinent labs within the past 24 hours have been reviewed.    Significant Imaging:  I have reviewed all pertinent imaging results/findings within the past 24 hours.    Assessment/Plan:     Neuro   Brain aneurysm    -patient reports that she has a brain aneurysm for which she takes lisinopril 20mg  -will hold in setting of MAURO and hypotension  -can restart when both resolve        Renal/   Hyperkalemia    -resolved        MAURO (acute kidney injury)    -patients Cr increased to 1.4 and then 1.6 (no kidney issues on baseline)  -most likely 2/2 to hypoperfusion  - 7/19 - Cr 0.8 - resolved          Hematology   Antiphospholipid syndrome    - On Coumadin at home (held for now) - IVC Filter placed 7/18  - INR corrected (5 on admission - currently 1.1)   - F/U - DRVVT, Phosphatidylserine ab  - Cardiolipin ab - APA Isotype IgG Positive at > 150  - Beta 2 microglobulin positive  - Haptoglobin - 199  - Fibrinogen - 518  - D-dimer quant - 1.81  - Bilateral LE ultrasound negative for DVT        Oncology   Leukocytosis    -  Resolved  - Procal insignificant  - Vanc and Zosyn D/C'd        Endocrine   Hypothyroidism    -patient is s/p thyroid CA and had a thyroidectomy  -she takes synthroid daily 150 mcg        GI   Transaminitis    rising LFTs - CBD at 7mm but no intrahepatic dilatation  - LFT's trending downward  - resolved        Orthopedic   * Perinephric hematoma    -  7/14: Perinephric hematoma measuring [id number] with evidence of mass effect on right kidney as well as free fluid noted in pelvis on CT scan (unknown how this compares to outside CT as no records/CT scan were available to MICU team - Urology has previous CT in there possession and are following patient. Appreciate their recs.  - 7/15: Repeat CTA with grossly stable size of hematoma  - H/H Now stable s/p 7 units PRBCs, 2FFP, 4 Platelets (last transfused 7/15)  - Trend CBC qd               Eric Lara MD  Critical Care Medicine  [facility]

## 2018-07-19 NOTE — PROGRESS NOTES
Notified CC regarding pt temperature that remains 99.0-99.4 and O2sats at 90% MD states shes not alarmed.Nurse will continue to monitor pt.

## 2018-07-19 NOTE — ASSESSMENT & PLAN NOTE
- 7/14: Perinephric hematoma measuring [id number] with evidence of mass effect on right kidney as well as free fluid noted in pelvis on CT scan (unknown how this compares to outside CT as no records/CT scan were available to MICU team - Urology has previous CT in there possession and are following patient. Appreciate their recs.  - 7/15: Repeat CTA with grossly stable size of hematoma  - H/H Now stable s/p 7 units PRBCs, 2FFP, 4 Platelets (last transfused 7/15)  - Trend CBC qd

## 2018-07-19 NOTE — NURSING
Pt transferred to room 927B.  Transferred with assist x 1 to bed.  VSS on room air.  Pt c/o headache and abdominal pain 6/10.  Given PRN tylenol PO.  Pain mgmt pain reviewed.  Pt and family oriented to room and unit.

## 2018-07-19 NOTE — ASSESSMENT & PLAN NOTE
- Patient is hemodynamically stable s/p 7 units PRBCs, 2FFP, 4 Platelets  - H/H stable, has not dropped in the last couple days.   - maintain INR at normal level   - continue PBRC, FFP, platelet transfusions as needed   - recommend incentive spirometry   - OOB, voiding spontaneously  - no surgical intervention at this time-- okay for diet  - no further imaging needed at this time, negative CTA 7/15    - please call with questions or concerns. We will sign off at this time.

## 2018-07-19 NOTE — PROGRESS NOTES
CC notified of Pt co of constant headache at 5. Pt given tylenol when arrived to unit. Md states give another dose of tylenol. Will cont to monitor pt

## 2018-07-19 NOTE — ASSESSMENT & PLAN NOTE
-patients Cr increased to 1.4 and then 1.6 (no kidney issues on baseline)  -most likely 2/2 to hypoperfusion  - 7/19 - Cr 0.8 - resolved

## 2018-07-20 LAB
ALBUMIN SERPL BCP-MCNC: 2.5 G/DL
ALP SERPL-CCNC: 128 U/L
ALT SERPL W/O P-5'-P-CCNC: 53 U/L
ANION GAP SERPL CALC-SCNC: 8 MMOL/L
AST SERPL-CCNC: 36 U/L
BASOPHILS # BLD AUTO: 0.02 K/UL
BASOPHILS NFR BLD: 0.2 %
BILIRUB SERPL-MCNC: 3.5 MG/DL
BUN SERPL-MCNC: 18 MG/DL
CALCIUM SERPL-MCNC: 9.2 MG/DL
CHLORIDE SERPL-SCNC: 105 MMOL/L
CO2 SERPL-SCNC: 27 MMOL/L
CREAT SERPL-MCNC: 0.8 MG/DL
DIFFERENTIAL METHOD: ABNORMAL
EOSINOPHIL # BLD AUTO: 0.3 K/UL
EOSINOPHIL NFR BLD: 3.2 %
ERYTHROCYTE [DISTWIDTH] IN BLOOD BY AUTOMATED COUNT: 15 %
EST. GFR  (AFRICAN AMERICAN): >60 ML/MIN/1.73 M^2
EST. GFR  (NON AFRICAN AMERICAN): >60 ML/MIN/1.73 M^2
GLUCOSE SERPL-MCNC: 100 MG/DL
HCT VFR BLD AUTO: 28.6 %
HGB BLD-MCNC: 9.8 G/DL
IMM GRANULOCYTES # BLD AUTO: 0.35 K/UL
IMM GRANULOCYTES NFR BLD AUTO: 4.2 %
LA PPP-IMP: POSITIVE
LYMPHOCYTES # BLD AUTO: 0.9 K/UL
LYMPHOCYTES NFR BLD: 10.7 %
MAGNESIUM SERPL-MCNC: 2.1 MG/DL
MCH RBC QN AUTO: 31.4 PG
MCHC RBC AUTO-ENTMCNC: 34.3 G/DL
MCV RBC AUTO: 92 FL
MONOCYTES # BLD AUTO: 0.8 K/UL
MONOCYTES NFR BLD: 9.3 %
NEUTROPHILS # BLD AUTO: 6 K/UL
NEUTROPHILS NFR BLD: 72.4 %
NRBC BLD-RTO: 0 /100 WBC
PHOSPHATE SERPL-MCNC: 2.6 MG/DL
PLATELET # BLD AUTO: 59 K/UL
PMV BLD AUTO: 11.8 FL
POTASSIUM SERPL-SCNC: 3.7 MMOL/L
PROT SERPL-MCNC: 6.5 G/DL
RBC # BLD AUTO: 3.12 M/UL
SODIUM SERPL-SCNC: 140 MMOL/L
WBC # BLD AUTO: 8.24 K/UL

## 2018-07-20 PROCEDURE — 25000003 PHARM REV CODE 250: Performed by: STUDENT IN AN ORGANIZED HEALTH CARE EDUCATION/TRAINING PROGRAM

## 2018-07-20 PROCEDURE — 99233 SBSQ HOSP IP/OBS HIGH 50: CPT | Mod: ,,, | Performed by: HOSPITALIST

## 2018-07-20 PROCEDURE — 25000003 PHARM REV CODE 250: Performed by: HOSPITALIST

## 2018-07-20 PROCEDURE — 85025 COMPLETE CBC W/AUTO DIFF WBC: CPT

## 2018-07-20 PROCEDURE — 80053 COMPREHEN METABOLIC PANEL: CPT

## 2018-07-20 PROCEDURE — 97116 GAIT TRAINING THERAPY: CPT

## 2018-07-20 PROCEDURE — 36415 COLL VENOUS BLD VENIPUNCTURE: CPT

## 2018-07-20 PROCEDURE — 83735 ASSAY OF MAGNESIUM: CPT

## 2018-07-20 PROCEDURE — 11000001 HC ACUTE MED/SURG PRIVATE ROOM

## 2018-07-20 PROCEDURE — 84100 ASSAY OF PHOSPHORUS: CPT

## 2018-07-20 RX ORDER — PROMETHAZINE HYDROCHLORIDE 12.5 MG/1
12.5 TABLET ORAL EVERY 12 HOURS PRN
Status: DISCONTINUED | OUTPATIENT
Start: 2018-07-20 | End: 2018-07-21 | Stop reason: HOSPADM

## 2018-07-20 RX ADMIN — ACETAMINOPHEN 650 MG: 325 TABLET, FILM COATED ORAL at 02:07

## 2018-07-20 RX ADMIN — ACETAMINOPHEN 650 MG: 325 TABLET, FILM COATED ORAL at 12:07

## 2018-07-20 RX ADMIN — PROMETHAZINE HYDROCHLORIDE 12.5 MG: 12.5 TABLET ORAL at 08:07

## 2018-07-20 RX ADMIN — ACETAMINOPHEN 650 MG: 325 TABLET, FILM COATED ORAL at 06:07

## 2018-07-20 RX ADMIN — PANTOPRAZOLE SODIUM 40 MG: 40 TABLET, DELAYED RELEASE ORAL at 08:07

## 2018-07-20 RX ADMIN — DOCUSATE SODIUM 100 MG: 100 CAPSULE, LIQUID FILLED ORAL at 08:07

## 2018-07-20 RX ADMIN — ONDANSETRON 8 MG: 8 TABLET, ORALLY DISINTEGRATING ORAL at 08:07

## 2018-07-20 RX ADMIN — ACETAMINOPHEN 650 MG: 325 TABLET, FILM COATED ORAL at 08:07

## 2018-07-20 RX ADMIN — PROMETHAZINE HYDROCHLORIDE 12.5 MG: 12.5 TABLET ORAL at 12:07

## 2018-07-20 RX ADMIN — LEVOTHYROXINE SODIUM 150 MCG: 75 TABLET ORAL at 06:07

## 2018-07-20 NOTE — PLAN OF CARE
Problem: Physical Therapy Goal  Goal: Physical Therapy Goal  Goals to be met by:      Patient will increase functional independence with mobility by performin. Supine to sit with Stand-by Assistance - met    Revised: supine to sit with mod (I)  2. Sit to supine with Stand-by Assistance - not met  3. Sit to stand transfer with Stand-by Assistance - met   Revised: sit to stand transfer with S   4. Gait  x 100 feet with Stand-by Assistance -met    Revised: Gait x 200 feet with supervision with or without appropriate AD  Outcome: Ongoing (interventions implemented as appropriate)  Pt met partial goals. Pt goals revised.     SEVERINO GHOSH, PT  2018

## 2018-07-20 NOTE — NURSING
Contacted Dr. Alves.  Pt c/o nausea.  Uses by mouth phenergen 12.5 mg PO at home for nausea.  Per provider, OK to d/c IV compazine.      Pt had IVC filter placed on 7/18.  Dressing CDI.  Per provider OK to remove dressing.

## 2018-07-20 NOTE — NURSING
Contacted by lab.  AM CMP, Mag, Phos specimen hemolyzed.  Redrew green top tube via midline cath.    @0945 - Per lab, specimen hemolyzed.  Specimen collect changed to lab collect.  Family updated.

## 2018-07-20 NOTE — PROGRESS NOTES
Progress Note  Hospital Medicine    Provider team: [facility] [place] HOSP MED S  Admit Date: 7/13/2018  Encounter Date: 07/20/2018     SUBJECTIVE:     Follow-up Visit for: Perinephric hematoma    HPI (See H&P for complete P,F,SHx):  62F APS on coumadin, thyroid cancer, IBS, brain aneurysm, and chronic migraines who presents from [facility] ED in [place], [place] with concerns of a perinephric hematoma.  Patient states that the pain began 7/12 at 11 pm. It progressively got worse. She did not try anything for the pain. The pain has been constant. It radiates to the right abdomen. Patient endorsed hematuria, HA, nausea, thirst, dry mouth. CT at OSH showed perinephric hematoma. Outside labs show INR of 2.6.  OSH labs were WBC/10.9, H/H 13.1/37.6  One unit of blood was given in route to [facility]. Patient received morphine, dilaudid, and phenergen at outside hospital. Patient completed 2 blood transfusions in [facility] ED.  At that time, patient was admitted to hospital medicine.  Hg was 10.7 s/p 2 U.  Urology and IR consulted for retroperitoneal bleed. They stated that there was no intervention needed at this time.  In the evening of 7/13, code response team was called due to patient feeling lethargic, weak, and diaphoretic. Team went to see patient. Extremely lethargic but easily aroused.  Oriented X4 with no motor deficits noted.  BS performed revealed a BS of 258.  BP 94/65 RR 12 O2 94% on RA.  Placed on 2L NC.  Labs including POCT glucose, ABG, lactic acid, CMP, CBC, PT/INR were ordered. Blood pressure started dropping: SBP running in the mid 90s and DBP in the mid 40s-low 50s. Bolus of saline and FFP were ordered and administered and patient admitted to ICU on 7/14.  On 7/15, CTA performed which was unable to appreciate active bleeding.  On 7/18, IVCF placed given contraindication to A/C.  Hematology feels A/C can be resumed once stable, though anticipate this re-initiation can occur as outpatient given  "life-threatening bleeding event. Will have patient f/u with H/O as outpatient.     Interval history:  IMS assumed care on 7/20.  Patient reports feeling debilitated/weak.  She has no CP or SOB. ROS otherwise negative.    Review of Systems:  Review of Systems   Constitutional: Positive for malaise/fatigue. Negative for chills and fever.   HENT: Negative for congestion and sore throat.    Eyes: Negative for photophobia, pain and discharge.   Respiratory: Negative for cough, hemoptysis, sputum production and shortness of breath.    Cardiovascular: Negative for chest pain, palpitations and leg swelling.   Gastrointestinal: Negative for abdominal pain, diarrhea, nausea and vomiting.   Genitourinary: Negative for dysuria and urgency.   Musculoskeletal: Negative for myalgias and neck pain.   Skin: Negative for itching and rash.   Neurological: Positive for weakness. Negative for sensory change, focal weakness and headaches.   Endo/Heme/Allergies: Negative for polydipsia. Does not bruise/bleed easily.   Psychiatric/Behavioral: Negative for depression and suicidal ideas.       OBJECTIVE:   No intake or output data in the 24 hours ending 07/20/18 0808  Vital Signs Range (Last 24H):  Temp:  [98.9 °F (37.2 °C)-99.2 °F (37.3 °C)]   Pulse:  []   Resp:  [17-30]   BP: (122-146)/(66-74)   SpO2:  [87 %-99 %]   Body mass index is 36.37 kg/m².    Objective:  General Appearance:  Comfortable, well-appearing, in no acute distress and not in pain.    Vital signs: (most recent): Blood pressure 139/76, pulse 94, temperature 98.4 °F (36.9 °C), temperature source Oral, resp. rate 18, height 5' 8" (1.727 m), weight 108.5 kg (239 lb 3.2 oz), SpO2 (!) 92 %, not currently breastfeeding.  No fever.    Output: Producing urine and producing stool.    HEENT: Normal HEENT exam.    Lungs:  Normal effort.  Breath sounds clear to auscultation.  She is not in respiratory distress.  No rales or wheezes.    Heart: Normal rate.  Regular rhythm.  S1 " normal and S2 normal.  No murmur.   Chest: Symmetric chest wall expansion.   Abdomen: Abdomen is soft.  Bowel sounds are normal.   There is no abdominal tenderness.     Extremities: Normal range of motion.  There is no deformity, effusion, dependent edema or local swelling.    Pulses: Distal pulses are intact.    Neurological: Patient is alert and oriented to person, place and time.  Normal strength.    Pupils:  Pupils are equal, round, and reactive to light.    Skin:  Warm and dry.      Medications:  Medication list was reviewed in EPIC and changes noted under Assessment/Plan and MAR.    Laboratory:  Recent Labs      07/20/18   0509   WBC  8.24   RBC  3.12*   HGB  9.8*   HCT  28.6*   PLT  59*   MCV  92   MCH  31.4*   MCHC  34.3   GRAN  72.4  6.0   LYMPH  10.7*  0.9*   MONO  9.3  0.8   EOS  0.3      Recent Labs      07/19/18   0405  07/19/18   1617   GLU  106  90   NA  139  139   K  3.7  3.6   CL  107  105   CO2  26  27   BUN  24*  21   CREATININE  0.8  0.8   CALCIUM  8.8  9.0   ANIONGAP  6*  7*   MG  2.1   --    PHOS  2.8   --        ASSESSMENT/PLAN:     Active Hospital Problems    Diagnosis  POA    *Perinephric hematoma [S37.019A]  Yes    Transaminitis [R74.0]  Yes    Leukocytosis [D72.829]  Yes    Antiphospholipid syndrome [D68.61]  Yes    Acute blood loss anemia [D62]  Yes    Thrombocytopenia [D69.6]  No    Brain aneurysm [I67.1]  Yes    MAURO (acute kidney injury) [N17.9]  Yes    Hypothyroidism [E03.9]  Yes    Hyperkalemia [E87.5]  Yes    Flank pain [R10.9]  Yes      Resolved Hospital Problems    Diagnosis Date Resolved POA   No resolved problems to display.      *Perinephric hematoma  - 7/14: Perinephric hematoma measuring [id number] with evidence of mass effect on right kidney  - 7/15: Repeat CTA with grossly stable size of hematoma  - H/H Now stable s/p 7 units PRBCs, 2FFP, 4 Platelets (last transfused 7/15)  - Trend CBC qd     Antiphospholipid syndrome  - On Coumadin at home (held for now) - IVC  Filter placed 7/18  - INR corrected (5 on admission - currently 1.1)   - F/U - DRVVT, Phosphatidylserine ab  - Cardiolipin ab - APA Isotype IgG Positive at > 150  - Beta 2 microglobulin positive  - Haptoglobin - 199  - Fibrinogen - 518  - D-dimer quant - 1.81  - Bilateral LE ultrasound negative for DVT  - Refer to hematology upon d/c, resume A/C as outpatient given present R>B in setting s/p IVC    Hypothyroidism, post-surgical  -patient is s/p thyroid CA and had a thyroidectomy  -she takes synthroid daily 150 mcg    Hold DVT PPx, GI PPx in place  Regular diet  Full code    Anticipated discharge date and disposition:   Home with home health when stable. Aim for d/c 7/21.  Pete Alves MD  [facility] Medicine

## 2018-07-20 NOTE — PT/OT/SLP PROGRESS
Occupational Therapy      Patient Name:  Emiliana Persaud   MRN:  [id number]    Patient not seen today secondary to performing sponge bath in chair with 's assistance. Will follow up as able.    SALIMA Otoole  7/20/2018  Pager: [phone]

## 2018-07-20 NOTE — CARE UPDATE
Pt with positive anticardiolipin antibody and beta-2 glycoprotein antibody indicating the patient likely has antiphospholipid antibody syndrome.  Would recommend restarting the patient on coumadin when deemed same from a bleeding perspective.  Pt will need f/u with hematolgoy as an outpatient.    Jose Luis Allen MD PGY-VI  Hematology and Oncology  Pager:[phone]

## 2018-07-20 NOTE — PT/OT/SLP PROGRESS
Physical Therapy Treatment    Patient Name:  Emiliana Persaud   MRN:  [id number]    Recommendations:     Discharge Recommendations:  home with home health   Discharge Equipment Recommendations: walker, rolling, bedside commode   Barriers to discharge: None    Assessment:     Emiliana Persaud is a 62 y.o. female admitted with a medical diagnosis of Perinephric hematoma.  She presents with the following impairments/functional limitations:  weakness, impaired functional mobilty, impaired endurance, impaired balance, gait instability, impaired cardiopulmonary response to activity. Pt performed transfers SBA and amb ~100ft SBA with RW, vc's for AD management and upright posture; no LOB and mild SOB. Pt amb limited 2* dizziness. Pt will continue to benefit from skilled PT to improve deficits and increase overall functional mobility.     Rehab Prognosis:  Good; patient would benefit from acute skilled PT services to address these deficits and reach maximum level of function.      Recent Surgery: Procedure(s) (LRB):  CTA (N/A) 5 Days Post-Op    Plan:     During this hospitalization, patient to be seen 3 x/week to address the above listed problems via gait training, therapeutic activities, therapeutic exercises  · Plan of Care Expires:  08/13/18   Plan of Care Reviewed with: patient, spouse    Subjective     Communicated with RN prior to session.  Patient found seated in bedside chair and  present upon PT entry to room, agreeable to treatment.      Chief Complaint: dizziness with mobility   Patient comments/goals: return home  Pain/Comfort:  · Pain Rating 1: 0/10  · Pain Rating Post-Intervention 1: 0/10    Patients cultural, spiritual, Gnosticism conflicts given the current situation: none reported     Objective:     Patient found with: telemetry     General Precautions: Standard, fall   Orthopedic Precautions:N/A   Braces: N/A     Functional Mobility:  Transfers:     · Sit to Stand:  stand by assistance with rolling walker;  vc's for hand placement    Gait: ~100ft SBA with RW, vc's for AD management and upright posture; no LOB and mild SOB. Pt amb limited 2* dizziness.      AM-PAC 6 CLICK MOBILITY  Turning over in bed (including adjusting bedclothes, sheets and blankets)?: 4  Sitting down on and standing up from a chair with arms (e.g., wheelchair, bedside commode, etc.): 3  Moving from lying on back to sitting on the side of the bed?: 3  Moving to and from a bed to a chair (including a wheelchair)?: 3  Need to walk in hospital room?: 3  Climbing 3-5 steps with a railing?: 3  Basic Mobility Total Score: 19       Therapeutic Activities and Exercises:  Pt and  educated on:  -role of PT/POC  -importance of OOB activity  -up in chair majority of the day  -use of BSC over toilet at home  -safety with amb with RW  -amb in hallway several times daily  Pt and  report not further questions or concerns from a mobility standpoint.  Pt safe to amb in hallway with RW with RN staff or family.    Patient left up in chair with all lines intact, call button in reach, RN notified and  present..    GOALS:    Physical Therapy Goals        Problem: Physical Therapy Goal    Goal Priority Disciplines Outcome Goal Variances Interventions   Physical Therapy Goal     PT/OT, PT Ongoing (interventions implemented as appropriate)     Description:  Goals to be met by:      Patient will increase functional independence with mobility by performin. Supine to sit with Stand-by Assistance - met    Revised: supine to sit with mod (I)  2. Sit to supine with Stand-by Assistance - not met  3. Sit to stand transfer with Stand-by Assistance - met   Revised: sit to stand transfer with S   4. Gait  x 100 feet with Stand-by Assistance -met    Revised: Gait x 200 feet with supervision with or without appropriate AD                    Time Tracking:     PT Received On: 18  PT Start Time: 1311     PT Stop Time: 1326  PT Total Time (min): 15  min     Billable Minutes: Gait Training 15    Treatment Type: Treatment  PT/PTA: PT     PTA Visit Number: 0     SEVERINO GHOSH, PT  07/20/2018

## 2018-07-21 VITALS
BODY MASS INDEX: 36.25 KG/M2 | RESPIRATION RATE: 20 BRPM | HEART RATE: 98 BPM | WEIGHT: 239.19 LBS | HEIGHT: 68 IN | OXYGEN SATURATION: 92 % | SYSTOLIC BLOOD PRESSURE: 133 MMHG | TEMPERATURE: 98 F | DIASTOLIC BLOOD PRESSURE: 65 MMHG

## 2018-07-21 LAB
ALBUMIN SERPL BCP-MCNC: 2.4 G/DL
ALP SERPL-CCNC: 136 U/L
ALT SERPL W/O P-5'-P-CCNC: 45 U/L
ANION GAP SERPL CALC-SCNC: 11 MMOL/L
AST SERPL-CCNC: 35 U/L
BASOPHILS # BLD AUTO: 0.07 K/UL
BASOPHILS NFR BLD: 0.8 %
BILIRUB SERPL-MCNC: 3.2 MG/DL
BUN SERPL-MCNC: 15 MG/DL
CALCIUM SERPL-MCNC: 8.9 MG/DL
CHLORIDE SERPL-SCNC: 105 MMOL/L
CO2 SERPL-SCNC: 22 MMOL/L
CREAT SERPL-MCNC: 0.8 MG/DL
DIFFERENTIAL METHOD: ABNORMAL
EOSINOPHIL # BLD AUTO: 0.2 K/UL
EOSINOPHIL NFR BLD: 2.1 %
ERYTHROCYTE [DISTWIDTH] IN BLOOD BY AUTOMATED COUNT: 13.6 %
EST. GFR  (AFRICAN AMERICAN): >60 ML/MIN/1.73 M^2
EST. GFR  (NON AFRICAN AMERICAN): >60 ML/MIN/1.73 M^2
GLUCOSE SERPL-MCNC: 91 MG/DL
HCT VFR BLD AUTO: 30.7 %
HGB BLD-MCNC: 10.3 G/DL
HYPOCHROMIA BLD QL SMEAR: ABNORMAL
IMM GRANULOCYTES # BLD AUTO: 0.74 K/UL
IMM GRANULOCYTES NFR BLD AUTO: 8 %
LYMPHOCYTES # BLD AUTO: 1.2 K/UL
LYMPHOCYTES NFR BLD: 13.4 %
MAGNESIUM SERPL-MCNC: 1.9 MG/DL
MCH RBC QN AUTO: 29.4 PG
MCHC RBC AUTO-ENTMCNC: 33.6 G/DL
MCV RBC AUTO: 88 FL
MONOCYTES # BLD AUTO: 0.9 K/UL
MONOCYTES NFR BLD: 9.9 %
NEUTROPHILS # BLD AUTO: 6.1 K/UL
NEUTROPHILS NFR BLD: 65.8 %
NRBC BLD-RTO: 0 /100 WBC
PHOSPHATE SERPL-MCNC: 2.8 MG/DL
PLATELET # BLD AUTO: 72 K/UL
PLATELET BLD QL SMEAR: ABNORMAL
PMV BLD AUTO: 11.8 FL
POTASSIUM SERPL-SCNC: 3.8 MMOL/L
PROT SERPL-MCNC: 6.3 G/DL
PS IGA SER IA-ACNC: 23 U/ML
PS IGG SER IA-ACNC: >100 U/ML
PS IGM SER IA-ACNC: 38 U/ML
RBC # BLD AUTO: 3.5 M/UL
SODIUM SERPL-SCNC: 138 MMOL/L
WBC # BLD AUTO: 9.21 K/UL

## 2018-07-21 PROCEDURE — 80053 COMPREHEN METABOLIC PANEL: CPT

## 2018-07-21 PROCEDURE — 84100 ASSAY OF PHOSPHORUS: CPT

## 2018-07-21 PROCEDURE — 85027 COMPLETE CBC AUTOMATED: CPT

## 2018-07-21 PROCEDURE — 25000003 PHARM REV CODE 250: Performed by: STUDENT IN AN ORGANIZED HEALTH CARE EDUCATION/TRAINING PROGRAM

## 2018-07-21 PROCEDURE — 85007 BL SMEAR W/DIFF WBC COUNT: CPT

## 2018-07-21 PROCEDURE — 83735 ASSAY OF MAGNESIUM: CPT

## 2018-07-21 PROCEDURE — 36415 COLL VENOUS BLD VENIPUNCTURE: CPT

## 2018-07-21 PROCEDURE — 99239 HOSP IP/OBS DSCHRG MGMT >30: CPT | Mod: ,,, | Performed by: HOSPITALIST

## 2018-07-21 PROCEDURE — 25000003 PHARM REV CODE 250: Performed by: HOSPITALIST

## 2018-07-21 RX ORDER — SERTRALINE HYDROCHLORIDE 50 MG/1
50 TABLET, FILM COATED ORAL DAILY
Status: DISCONTINUED | OUTPATIENT
Start: 2018-07-21 | End: 2018-07-21 | Stop reason: HOSPADM

## 2018-07-21 RX ADMIN — ACETAMINOPHEN 650 MG: 325 TABLET, FILM COATED ORAL at 06:07

## 2018-07-21 RX ADMIN — PANTOPRAZOLE SODIUM 40 MG: 40 TABLET, DELAYED RELEASE ORAL at 09:07

## 2018-07-21 RX ADMIN — SERTRALINE HYDROCHLORIDE 50 MG: 50 TABLET ORAL at 02:07

## 2018-07-21 RX ADMIN — DOCUSATE SODIUM 100 MG: 100 CAPSULE, LIQUID FILLED ORAL at 09:07

## 2018-07-21 RX ADMIN — LEVOTHYROXINE SODIUM 150 MCG: 75 TABLET ORAL at 05:07

## 2018-07-21 NOTE — PLAN OF CARE
[facility]    HOME HEALTH ORDERS  FACE TO FACE ENCOUNTER    Patient Name: Emiliana Persaud  YOB: 1955    PCP: [facility]   PCP Address: [address] / [facility] [place] [address]  PCP Phone Number: [phone]  PCP Fax: None    Encounter Date: 07/21/2018    Admit to Home Health    Diagnoses:  Active Hospital Problems    Diagnosis  POA    *Perinephric hematoma [S37.019A]  Yes    Transaminitis [R74.0]  Yes    Leukocytosis [D72.829]  Yes    Antiphospholipid syndrome [D68.61]  Yes    Acute blood loss anemia [D62]  Yes    Thrombocytopenia [D69.6]  No    Brain aneurysm [I67.1]  Yes    MAURO (acute kidney injury) [N17.9]  Yes    Hypothyroidism [E03.9]  Yes    Hyperkalemia [E87.5]  Yes    Flank pain [R10.9]  Yes      Resolved Hospital Problems    Diagnosis Date Resolved POA   No resolved problems to display.       No future appointments.  Follow-up Information     [facility] In 1 week.    Contact information:  [address]  [facility] [place] [id number] [phone]                 I have seen and examined this patient face to face today. My clinical findings that support the need for the home health skilled services and home bound status are the following:  Weakness/numbness causing balance and gait disturbance due to Weakness/Debility making it taxing to leave home.    Allergies:  Review of patient's allergies indicates:   Allergen Reactions    Bactrim [sulfamethoxazole-trimethoprim]     Iodine and iodide containing products      According to pt's son, pt's neck swells up with iodine contrast exposure     Diet: regular diet    Activities: activity as tolerated    Nursing:   SN to complete comprehensive assessment including routine vital signs. Instruct on disease process and s/s of complications to report to MD. Review/verify medication list sent home with the patient at time of discharge  and instruct patient/caregiver as needed. Frequency may be adjusted  depending on start of care date.    Notify MD if SBP > 160 or < 90; DBP > 90 or < 50; HR > 120 or < 50; Temp > 101    CONSULTS:    Physical Therapy to evaluate and treat. Evaluate for home safety and equipment needs; Establish/upgrade home exercise program. Perform / instruct on therapeutic exercises, gait training, transfer training, and Range of Motion.  Occupational Therapy to evaluate and treat. Evaluate home environment for safety and equipment needs. Perform/Instruct on transfers, ADL training, ROM, and therapeutic exercises.    Medications: Review discharge medications with patient and family and provide education.      Current Discharge Medication List      CONTINUE these medications which have NOT CHANGED    Details   atorvastatin (LIPITOR) 40 MG tablet Take 40 mg by mouth once daily.      B-complex with vitamin C (Z-BEC OR EQUIV) tablet Take 1 tablet by mouth once daily.      butalbital-aspirin-caffeine -40 mg (FIORINAL) -40 mg Cap Take 1 capsule by mouth every 6 (six) hours as needed.      calcium carbonate (OS-PATRICIA) 500 mg calcium (1,250 mg) tablet Take 1 tablet by mouth once daily.      co-enzyme Q-10 30 mg capsule Take 30 mg by mouth once daily.      esomeprazole (NEXIUM) 40 MG capsule Take 40 mg by mouth once daily.      fish oil-omega-3 fatty acids 300-1,000 mg capsule Take 1 capsule by mouth once daily.      glucosamine-chondroitin 500-400 mg tablet Take 1 tablet by mouth once daily.      levothyroxine (SYNTHROID) 150 MCG tablet Take 150 mcg by mouth once daily. 1 hour before food      MULTIVIT-IRON-MIN-FOLIC ACID 3,500-18-0.4 UNIT-MG-MG ORAL CHEW Take by mouth once daily.      niacin 500 MG Tab Take 100 mg by mouth once daily.      promethazine (PHENERGAN) 12.5 MG Tab Take 12.5 mg by mouth every 6 (six) hours as needed. Taken with fiorcet for migraine headaches       propranolol (INDERAL) 40 MG tablet Take 40 mg by mouth 2 (two) times daily.      sertraline (ZOLOFT) 50 MG tablet Take 50  mg by mouth once daily.      vitamin D 1000 units Tab Take 1,000 Units by mouth once daily.         STOP taking these medications       potassium chloride (KLOR-CON) 10 MEQ TbSR Comments:   Reason for Stopping:         warfarin (COUMADIN) 5 MG tablet Comments:   Reason for Stopping:               I certify that this patient is confined to her home and needs intermittent skilled nursing care, physical therapy and occupational therapy.

## 2018-07-21 NOTE — PLAN OF CARE
"Problem: Patient Care Overview  Goal: Plan of Care Review  Outcome: Ongoing (interventions implemented as appropriate)  Pt A&O x 4.  VSS on room air.  Pt c/o headache pain, managed with  PRN tylenol.  Pt c/o intermittent nausea. Given PRN ondansentron x 1 and phenergen x 1.  Full relief obtained.  Pt describes "tightness" to abdomen.  Pt walked with walker and assist x 1 in m[place]ragon.  Telemetry NSR, sinus tachycardia on activity.          "

## 2018-07-21 NOTE — PROGRESS NOTES
Progress Note  Hospital Medicine    Provider team: [facility]  Admit Date: 7/13/2018  Encounter Date: 07/21/2018     SUBJECTIVE:     Follow-up Visit for: Perinephric hematoma    HPI (See H&P for complete P,F,SHx):  62F APS on coumadin, thyroid cancer, IBS, brain aneurysm, and chronic migraines who presents from [facility] ED in [place], [place] with concerns of a perinephric hematoma.  Patient states that the pain began 7/12 at 11 pm. It progressively got worse. She did not try anything for the pain. The pain has been constant. It radiates to the right abdomen. Patient endorsed hematuria, HA, nausea, thirst, dry mouth. CT at OSH showed perinephric hematoma. Outside labs show INR of 2.6.  OSH labs were WBC/10.9, H/H 13.1/37.6  One unit of blood was given in route to [facility]. Patient received morphine, dilaudid, and phenergen at outside hospital. Patient completed 2 blood transfusions in [facility] ED.  At that time, patient was admitted to hospital medicine.  Hg was 10.7 s/p 2 U.  Urology and IR consulted for retroperitoneal bleed. They stated that there was no intervention needed at this time.  In the evening of 7/13, code response team was called due to patient feeling lethargic, weak, and diaphoretic. Team went to see patient. Extremely lethargic but easily aroused.  Oriented X4 with no motor deficits noted.  BS performed revealed a BS of 258.  BP 94/65 RR 12 O2 94% on RA.  Placed on 2L NC.  Labs including POCT glucose, ABG, lactic acid, CMP, CBC, PT/INR were ordered. Blood pressure started dropping: SBP running in the mid 90s and DBP in the mid 40s-low 50s. Bolus of saline and FFP were ordered and administered and patient admitted to ICU on 7/14.  On 7/15, CTA performed which was unable to appreciate active bleeding.  On 7/18, IVCF placed given contraindication to A/C.  Hematology feels A/C can be resumed once stable, though anticipate this re-initiation can occur as outpatient given  "life-threatening bleeding event. Will have patient f/u with H/O as outpatient.     Interval history:  Patient doing well today. She has a few questions and they were answered to patient/family satisfaction. Advised to hold coumadin and discussed the risks associated with holding it.  Patient in agreement.  Home health is established for patient as d/w CM/SW.    Review of Systems:  Review of Systems   Constitutional: Negative for chills, fever and malaise/fatigue.   HENT: Negative for congestion and sore throat.    Eyes: Negative for photophobia, pain and discharge.   Respiratory: Negative for cough, hemoptysis, sputum production and shortness of breath.    Cardiovascular: Negative for chest pain, palpitations and leg swelling.   Gastrointestinal: Negative for abdominal pain, diarrhea, nausea and vomiting.   Genitourinary: Negative for dysuria and urgency.   Musculoskeletal: Negative for myalgias and neck pain.   Skin: Negative for itching and rash.   Neurological: Positive for weakness. Negative for sensory change, focal weakness and headaches.   Endo/Heme/Allergies: Negative for polydipsia. Does not bruise/bleed easily.   Psychiatric/Behavioral: Negative for depression and suicidal ideas.       OBJECTIVE:       Intake/Output Summary (Last 24 hours) at 07/21/18 0852  Last data filed at 07/21/18 0600   Gross per 24 hour   Intake             1080 ml   Output                0 ml   Net             1080 ml     Vital Signs Range (Last 24H):  Temp:  [98.2 °F (36.8 °C)-99.3 °F (37.4 °C)]   Pulse:  []   Resp:  [16-20]   BP: (123-143)/(58-76)   SpO2:  [90 %-94 %]   Body mass index is 36.37 kg/m².    Objective:  General Appearance:  Comfortable, well-appearing, in no acute distress and not in pain.    Vital signs: (most recent): Blood pressure 128/69, pulse 88, temperature 98.8 °F (37.1 °C), temperature source Oral, resp. rate 17, height 5' 8" (1.727 m), weight 108.5 kg (239 lb 3.2 oz), SpO2 (!) 92 %, not currently " breastfeeding.  No fever.    Output: Producing urine and producing stool.    HEENT: Normal HEENT exam.    Lungs:  Normal effort.  Breath sounds clear to auscultation.  She is not in respiratory distress.  No rales or wheezes.    Heart: Normal rate.  Regular rhythm.  S1 normal and S2 normal.  No murmur.   Chest: Symmetric chest wall expansion.   Abdomen: Abdomen is soft.  Bowel sounds are normal.   There is no abdominal tenderness.     Extremities: Normal range of motion.  There is no deformity, effusion, dependent edema or local swelling.    Pulses: Distal pulses are intact.    Neurological: Patient is alert and oriented to person, place and time.  Normal strength.    Pupils:  Pupils are equal, round, and reactive to light.    Skin:  Warm and dry.      Medications:  Medication list was reviewed in EPIC and changes noted under Assessment/Plan and MAR.    Laboratory:  Recent Labs      07/21/18   0523   WBC  9.21   RBC  3.50*   HGB  10.3*   HCT  30.7*   PLT  72*   MCV  88   MCH  29.4   MCHC  33.6   GRAN  65.8  6.1   LYMPH  13.4*  1.2   MONO  9.9  0.9   EOS  0.2      Recent Labs      07/21/18   0523   GLU  91   NA  138   K  3.8   CL  105   CO2  22*   BUN  15   CREATININE  0.8   CALCIUM  8.9   ANIONGAP  11   MG  1.9   PHOS  2.8       ASSESSMENT/PLAN:     Active Hospital Problems    Diagnosis  POA    *Perinephric hematoma [S37.019A]  Yes    Transaminitis [R74.0]  Yes    Leukocytosis [D72.829]  Yes    Antiphospholipid syndrome [D68.61]  Yes    Acute blood loss anemia [D62]  Yes    Thrombocytopenia [D69.6]  No    Brain aneurysm [I67.1]  Yes    MAURO (acute kidney injury) [N17.9]  Yes    Hypothyroidism [E03.9]  Yes    Hyperkalemia [E87.5]  Yes    Flank pain [R10.9]  Yes      Resolved Hospital Problems    Diagnosis Date Resolved POA   No resolved problems to display.      *Perinephric hematoma  - 7/14: Perinephric hematoma measuring [id number] with evidence of mass effect on right kidney  - 7/15: Repeat CTA with  grossly stable size of hematoma  - H/H Now stable s/p 7 units PRBCs, 2FFP, 4 Platelets (last transfused 7/15)  - H/H stable    Antiphospholipid syndrome  - On Coumadin at home (held for now) - IVC Filter placed 7/18  - INR corrected (5 on admission - currently 1.1)   - F/U - DRVVT, Phosphatidylserine ab  - Cardiolipin ab - APA Isotype IgG Positive at > 150  - Beta 2 microglobulin positive  - Haptoglobin - 199  - Fibrinogen - 518  - D-dimer quant - 1.81  - Bilateral LE ultrasound negative for DVT  - Refer to hematology upon d/c, resume A/C as outpatient given present R>B in setting s/p IVC    Hypothyroidism, post-surgical  -patient is s/p thyroid CA and had a thyroidectomy  -she takes synthroid daily 150 mcg    Hold DVT PPx, GI PPx in place  Regular diet  Full code    Anticipated discharge date and disposition:   D/c home with home health today. 35 minutes.  Pete Alves MD  [facility] Medicine

## 2018-07-21 NOTE — PLAN OF CARE
Problem: Patient Care Overview  Goal: Plan of Care Review  Outcome: Ongoing (interventions implemented as appropriate)   07/20/18 1845   Coping/Psychosocial   Plan Of Care Reviewed With patient       Problem: Fall Risk (Adult)  Goal: Identify Related Risk Factors and Signs and Symptoms  Related risk factors and signs and symptoms are identified upon initiation of Human Response Clinical Practice Guideline (CPG)   Outcome: Ongoing (interventions implemented as appropriate)   07/14/18 0604   Fall Risk   Related Risk Factors (Fall Risk) polypharmacy;environment unfamiliar   Signs and Symptoms (Fall Risk) presence of risk factors       Problem: Pressure Ulcer Risk (Lucien Scale) (Adult,Obstetrics,Pediatric)  Goal: Identify Related Risk Factors and Signs and Symptoms  Related risk factors and signs and symptoms are identified upon initiation of Human Response Clinical Practice Guideline (CPG)   Outcome: Ongoing (interventions implemented as appropriate)   07/18/18 1744   Pressure Ulcer Risk (Lucien Scale)   Related Risk Factors (Pressure Ulcer Risk (Lucien Scale)) critical care admission       Problem: Infection, Risk/Actual (Adult)  Goal: Identify Related Risk Factors and Signs and Symptoms  Related risk factors and signs and symptoms are identified upon initiation of Human Response Clinical Practice Guideline (CPG)   Outcome: Ongoing (interventions implemented as appropriate)   07/14/18 0604   Infection, Risk/Actual   Related Risk Factors (Infection, Risk/Actual) chronic illness/condition   Signs and Symptoms (Infection, Risk/Actual) heart rate increase;lab value changes;malaise;pain;weakness

## 2018-07-21 NOTE — DISCHARGE SUMMARY
Discharge Summary  Hospital Medicine    Attending Provider on Discharge: Pete Alves     Dis[place]ing Team: [facility]     Date of Admission:  7/13/2018         Date of Discharge:  7/21/2018      Diagnoses:     Principal Problem(s):   Perinephric hematoma     Secondary Problems:  Active Hospital Problems    Diagnosis    *Perinephric hematoma    Transaminitis    Leukocytosis    Antiphospholipid syndrome    Acute blood loss anemia    Thrombocytopenia    Brain aneurysm    MAURO (acute kidney injury)    Hypothyroidism    Hyperkalemia    Flank pain        Hospital Course:      62F APS on coumadin, thyroid cancer, IBS, brain aneurysm, and chronic migraines who presents from [facility] ED in [place], [place] with concerns of a perinephric hematoma.  Patient states that the pain began 7/12 at 11 pm. It progressively got worse. She did not try anything for the pain. The pain has been constant. It radiates to the right abdomen. Patient endorsed hematuria, HA, nausea, thirst, dry mouth. CT at OSH showed perinephric hematoma. Outside labs show INR of 2.6.  OSH labs were WBC/10.9, H/H 13.1/37.6  One unit of blood was given in route to [facility]. Patient received morphine, dilaudid, and phenergen at outside hospital. Patient completed 2 blood transfusions in [facility] ED.  At that time, patient was admitted to hospital medicine.  Hg was 10.7 s/p 2 U.  Urology and IR consulted for retroperitoneal bleed. They stated that there was no intervention needed at this time.  In the evening of 7/13, code response team was called due to patient feeling lethargic, weak, and diaphoretic. Team went to see patient. Extremely lethargic but easily aroused.  Oriented X4 with no motor deficits noted.  BS performed revealed a BS of 258.  BP 94/65 RR 12 O2 94% on RA.  Placed on 2L NC.  Labs including POCT glucose, ABG, lactic acid, CMP, CBC, PT/INR were ordered. Blood pressure started dropping: SBP running in the mid 90s  and DBP in the mid 40s-low 50s. Bolus of saline and FFP were ordered and administered and patient admitted to ICU on 7/14.  On 7/15, CTA performed which was unable to appreciate active bleeding.  On 7/18, IVCF placed given contraindication to A/C.  Hematology feels A/C can be resumed once stable, though anticipate this re-initiation can occur as outpatient given life-threatening bleeding event. Will have patient f/u with H/O as outpatient. IMS assumed care on 7/20.  Patient reports feeling debilitated/weak.  She has no CP or SOB. ROS otherwise negative.  Patient worked with PT/OT and they are recommending of home with home health.  Please see below for further details:    *Perinephric hematoma  - 7/14: Perinephric hematoma measuring [id number] with evidence of mass effect on right kidney  - 7/15: Repeat CTA with grossly stable size of hematoma  - H/H Now stable s/p 7 units PRBCs, 2FFP, 4 Platelets (last transfused 7/15)  - H/H stable     Antiphospholipid syndrome  - On Coumadin at home (held for now) - IVC Filter placed 7/18  - INR corrected (5 on admission - currently 1.1)   - F/U - DRVVT, Phosphatidylserine ab  - Cardiolipin ab - APA Isotype IgG Positive at > 150  - Beta 2 microglobulin positive  - Haptoglobin - 199  - Fibrinogen - 518  - D-dimer quant - 1.81  - Bilateral LE ultrasound negative for DVT  - Refer to hematology upon d/c, resume A/C as outpatient given present R>B in setting s/p IVC     Hypothyroidism, post-surgical  -patient is s/p thyroid CA and had a thyroidectomy  -she takes synthroid daily 150 mcg     Significant Diagnostic Studies:   Labs:   Recent Labs      07/21/18   0523   WBC  9.21   RBC  3.50*   HGB  10.3*   HCT  30.7*   PLT  72*   MCV  88   MCH  29.4   MCHC  33.6   GRAN  65.8  6.1   LYMPH  13.4*  1.2   MONO  9.9  0.9   EOS  0.2      Recent Labs      07/21/18   0523   GLU  91   NA  138   K  3.8   CL  105   CO2  22*   BUN  15   CREATININE  0.8   CALCIUM  8.9   ANIONGAP  11   MG  1.9    PHOS  2.8     Radiology:   Results for orders placed during the hospital encounter of 07/13/18   X-Ray Chest 1 View    Narrative EXAMINATION:  XR CHEST 1 VIEW    CLINICAL HISTORY:  shortness of breath;    TECHNIQUE:  Single frontal view of the chest was performed.    COMPARISON:  Chest one view 03/19/2018    FINDINGS:  No pneumothorax.  Interval prominence of the mediastinum may be related to positioning.  Clinical correlation is recommended.  Otherwise no change in the cardiopulmonary status of the patient when compared to the prior.      Impression As above.      Electronically signed by: Laurie Jerry MD  Date:    07/15/2018  Time:    09:12     Cardiac Studies: None    Significant Treatments/Procedures:   7/15: CTA A/P  7/18: Placement of IVCF    Consults while in Hospital:   Hematology/Oncology, Interventional Radiology, Pulmonary/Intensive care and Urology    Discharge Medications:      Current Discharge Medication List      CONTINUE these medications which have NOT CHANGED    Details   atorvastatin (LIPITOR) 40 MG tablet Take 40 mg by mouth once daily.      B-complex with vitamin C (Z-BEC OR EQUIV) tablet Take 1 tablet by mouth once daily.      butalbital-aspirin-caffeine -40 mg (FIORINAL) -40 mg Cap Take 1 capsule by mouth every 6 (six) hours as needed.      calcium carbonate (OS-PATRICIA) 500 mg calcium (1,250 mg) tablet Take 1 tablet by mouth once daily.      co-enzyme Q-10 30 mg capsule Take 30 mg by mouth once daily.      esomeprazole (NEXIUM) 40 MG capsule Take 40 mg by mouth once daily.      fish oil-omega-3 fatty acids 300-1,000 mg capsule Take 1 capsule by mouth once daily.      glucosamine-chondroitin 500-400 mg tablet Take 1 tablet by mouth once daily.      levothyroxine (SYNTHROID) 150 MCG tablet Take 150 mcg by mouth once daily. 1 hour before food      MULTIVIT-IRON-MIN-FOLIC ACID 3,500-18-0.4 UNIT-MG-MG ORAL CHEW Take by mouth once daily.      niacin 500 MG Tab Take 100 mg by mouth once  daily.      promethazine (PHENERGAN) 12.5 MG Tab Take 12.5 mg by mouth every 6 (six) hours as needed. Taken with fiorcet for migraine headaches       propranolol (INDERAL) 40 MG tablet Take 40 mg by mouth 2 (two) times daily.      sertraline (ZOLOFT) 50 MG tablet Take 50 mg by mouth once daily.      vitamin D 1000 units Tab Take 1,000 Units by mouth once daily.         STOP taking these medications       potassium chloride (KLOR-CON) 10 MEQ TbSR Comments:   Reason for Stopping:         warfarin (COUMADIN) 5 MG tablet Comments:   Reason for Stopping:              Discharge Diet:regular diet with Normal Fluid intake of 1500 - 2000 mL per day    Activity: activity as tolerated    Discharged Condition: Stable    Discharge Disposition: Home-Health Care [facility] [place] [place]    Follow-up:   Ambulatory referral made to hematology/oncology.  F/u with PCP in 1 week.    Studies/Results pending on discharge:   None    Pete Alves MD  [facility]

## 2018-07-21 NOTE — PLAN OF CARE
CM to pt's bedside - pt is amenable to h/h and recs for RW & BSC; CM verified d/c address. Pt stated [facility] was used by her mother in the past (w/  ins) and pt is agreeable w/ a referral to this agency. Pt is aware that prior auth may be required d/t  ins before h/h visits begin.     Update: [place] [facility] [place] [facility] [place] Dusty gill [phone]. CM also spoke w/ [facility] - will be able to accept pt but will see on Monday after insurance approval. CM relayed info to Dr. Long gómez'ed.

## 2018-07-21 NOTE — NURSING
Telemetry removed.  IV's removed.  Discharge instructions provided.  Patient waiting for her  to bring her clothes.

## 2018-07-21 NOTE — NURSING
"Patient reports "panic attacks".. Requests antidepressant (home med) prior to discharge.  Spoke with Dr. Alves, and telephone order received.  "

## 2018-07-22 LAB
BACTERIA BLD CULT: NORMAL
BACTERIA BLD CULT: NORMAL

## 2018-07-26 ENCOUNTER — NURSE TRIAGE (OUTPATIENT)
Dept: ADMINISTRATIVE | Facility: CLINIC | Age: 63
End: 2018-07-26

## 2018-07-27 NOTE — TELEPHONE ENCOUNTER
"  Reason for Disposition   General information question, no triage required and triager able to answer question    Answer Assessment - Initial Assessment Questions  1. REASON FOR CALL or QUESTION: "What is your reason for calling today?" or "How can I best help you?" or "What question do you have that I can help answer?"      Daughter reported pt was recently in [facility] for a kidney bleed. Was discharged home. Pt went to ER in [place] today at [facility] as she was having some pain and difficulty breathing  where she has been all day. They had wanted to get her transferred to [facility] but have been told they have no room for her. 's are wanting to do procedures on pt that daughter doesn't feel are necessary - stated pt had signed release of information this am so [facility] could get her records but the night  is telling her they weren't received. He advised her she can try and call for the records as sometimes it's easier for family to get the records    Protocols used: ST INFORMATION ONLY CALL-A-AH    "

## 2018-08-09 NOTE — PT/OT/SLP DISCHARGE
Occupational Therapy Discharge Summary    Emiliana Persaud  MRN: [id number]   Principal Problem: Perinephric hematoma      Patient Discharged from acute Occupational Therapy on 8/9/2018  .  Please refer to prior OT note dated 7/17/18 for functional status.    Assessment:      Patient appropriate for care in another setting.    Objective:     GOALS:    Occupational Therapy Goals        Problem: Occupational Therapy Goal    Goal Priority Disciplines Outcome Interventions   Occupational Therapy Goal     OT, PT/OT Ongoing (interventions implemented as appropriate)    Description:  Goals to be met by: 7/22/18     Patient will increase functional independence with ADLs by performing:    UE Dressing with Moderate Assistance.  Grooming while seated with Stand-by Assistance.  Toileting from bedside commode with Maximum Assistance for hygiene and clothing management.   Sitting at edge of bed x15 minutes with Stand-by Assistance.  Rolling to Bilateral with Stand-by Assistance.   Supine to sit with Minimal Assistance.  Stand pivot transfers with Moderate Assistance.                  Pt progressing toward stated goals, but goals not yet achieved upon hospital d/c.     Reasons for Discontinuation of Therapy Services  Transfer to alternate level of care.      Plan:     Patient Discharged to: Home with     SALIMA Pool  8/9/2018

## 2019-05-25 NOTE — ED PROVIDER NOTES
Encounter Date: 7/13/2018    SCRIBE #1 NOTE: I, Alexi Vela, am scribing for, and in the presence of,  Dr. Sánchez. I have scribed the following portions of the note - the Resident attestation.       History     Chief Complaint   Patient presents with    Transfer     bleeding right kidney, non traumatic, patient takes coumadin     63 yo F presents from OSH c/o severe R lower back/flank. Pain began last night at 11 pm. Has progressively gotten worse. Pain is constant. Radiates to R abdomen. Endorses hematuria, HA. Denies CP, SOB. CT at OSH shows perinephric hematoma. Pt has PMh of multiple issues including anti-phospholipid syndrome. Outside labs show INR of 2.6, now 5. Receiving 2nd blood transfusion in our ED. Associated symptoms are nausea. OSH labs are wbc/10.9, h/h 13.1/37.6 with plt 234. Cmp: ca 8.1, cr 0.8m glu 145, na 140, alt 38, ast 30, co2 25, k 4.6. Pt complains of thirst and dry mouth and says she has not had any oral intake since last night. 1 unit of blood given in route to [facility].           Review of patient's allergies indicates:   Allergen Reactions    Bactrim [sulfamethoxazole-trimethoprim]     Iodine and iodide containing products      Past Medical History:   Diagnosis Date    Antiphospholipid antibody syndrome     Brain aneurysm     Cancer     GERD (gastroesophageal reflux disease)     Migraine headache     Thyroid disease      Past Surgical History:   Procedure Laterality Date    CHOLECYSTECTOMY      HERNIA REPAIR      KNEE SURGERY      THYROID SURGERY       History reviewed. No pertinent family history.  Social History   Substance Use Topics    Smoking status: Never Smoker    Smokeless tobacco: Not on file    Alcohol use Not on file     Review of Systems   Constitutional: Negative for chills, diaphoresis and fever.   Eyes: Negative for visual disturbance.   Respiratory: Negative for chest tightness and shortness of breath.    Cardiovascular: Negative for chest pain.  no   Gastrointestinal: Positive for abdominal pain. Negative for abdominal distention.   Genitourinary: Positive for flank pain and hematuria.   Musculoskeletal: Positive for back pain.   Skin: Negative for color change.   Neurological: Negative for dizziness and headaches.   Psychiatric/Behavioral: Negative for agitation and behavioral problems.       Physical Exam     Initial Vitals   BP Pulse Resp Temp SpO2   07/13/18 1356 07/13/18 1356 07/13/18 1604 07/13/18 1358 07/13/18 1356   123/80 104 20 98.7 °F (37.1 °C) 97 %      MAP       --                Physical Exam    Nursing note and vitals reviewed.  Constitutional: She appears well-developed and well-nourished. She appears distressed.   HENT:   Head: Normocephalic and atraumatic.   Eyes: EOM are normal. Pupils are equal, round, and reactive to light.   Neck: Normal range of motion. Neck supple. No JVD present.   Cardiovascular: Normal rate, regular rhythm and normal heart sounds.   Pulmonary/Chest: She has wheezes in the left lower field.   Abdominal: Soft. Bowel sounds are normal. There is tenderness.   Musculoskeletal: She exhibits no edema.        Arms:  L Right back/flank tenderness   Neurological: She is alert and oriented to person, place, and time.   Skin: Skin is warm and dry. Capillary refill takes less than 2 seconds.   Psychiatric: She has a normal mood and affect.         ED Course   Procedures  Labs Reviewed   APTT - Abnormal; Notable for the following:        Result Value    aPTT 56.8 (*)     All other components within normal limits   PROTIME-INR - Abnormal; Notable for the following:     Prothrombin Time 49.3 (*)     INR 5.0 (*)     All other components within normal limits    Narrative:       PT & INR  critical result(s) called and verbal readback obtained   from Mikaela Hall RN , 07/13/2018 14:42   CBC W/ AUTO DIFFERENTIAL - Abnormal; Notable for the following:     WBC 16.68 (*)     RBC 3.84 (*)     Hemoglobin 11.0 (*)     Hematocrit 35.1 (*)   yes    MCHC 31.3 (*)     Gran # (ANC) 13.6 (*)     Immature Grans (Abs) 0.09 (*)     Mono # 1.1 (*)     Gran% 81.4 (*)     Lymph% 11.2 (*)     All other components within normal limits   COMPREHENSIVE METABOLIC PANEL - Abnormal; Notable for the following:     Potassium 5.7 (*)     Chloride 112 (*)     CO2 18 (*)     Glucose 206 (*)     Calcium 8.0 (*)     Albumin 2.9 (*)     Total Bilirubin 1.1 (*)     AST 58 (*)     eGFR if  56.4 (*)     eGFR if non  48.9 (*)     All other components within normal limits   URINALYSIS, REFLEX TO URINE CULTURE   TYPE & SCREEN     EKG Readings: (Independently Interpreted)   NSR, HR 98 bpm, left axis deviation. T wave inversions in 3, AVF. St segments normal        Imaging Results    None          Medical Decision Making:   Differential Diagnosis:   Perinephric hematoma - CT imaging confirms presence of this finding from OSH  Kidney stone - sudden onset severe R flank pain with radiation to R abdomen  Hyperkalemia - treatment initiated with insulin 10 and dextrose 50g. cmp shows initial K of 4.6 at OSH and 5.7 here.   Clinical Tests:   Lab Tests: Ordered  Radiological Study: Ordered  Medical Tests: Ordered  ED Management:  4 mg in morphine given for pain management. IR and urology consulted. Pt has pmh of anti phospholipid syndrome. Labs from [facility] show INR of 2.6 and Hct of 13.6. Transferred here for IR evaluation. C/o severe R back pain. Evaluated in our ED while receiving 2nd unit of blood. Received 1 unit in route from OSH. Hemodynamically stable. hct down to 11. Consult into urology and IR placed. CT from OSH shows large right perinephric hematoma. INR on our labs show increase to 5. Discussed with IR and urology, following submitting for admission to medicine - decided to pretreat with steroirds and benadryl as prophylaxis should IV contrast be needed for CTA given IR's need to have CTA for workup of possible embolization procedure.  Insulin and dextrose given for 5.7 K. The need for the pt to have 50 mg iv benadryl 1 hour prior to and iodine contrast exposure has been discussed with the resident on the medicine team under dr. Argueta taking care of pt. Physician's spectralink # as of 3:53 pm 7/13 is [phone].  Of note, 2 units of blood have been given to pt, both from the OSH [facility]. 1 in route and one was hung in pt's ED room at [facility].             Scribe Attestation:   Scribe #1: I performed the above scribed service and the documentation accurately describes the services I performed. I attest to the accuracy of the note.    Attending Attestation:   Physician Attestation Statement for Resident:  As the supervising MD   Physician Attestation Statement: I have personally seen and examined this patient.   I agree with the above history. -: 62 y.o. woman presents as transfer for urology evaluation of atraumatic right perinephric hematoma. Pt is on coumadin for antiphospholipid syndrome. Labs at outside facility reveal INR of 2.6 and hemoglobin of 13.6. She was transferred here for IR and urology evaluation. Pt initially presented for severe right sided back pain. She was evaluated in our ED while receiving her second unit of blood. Repeat hemoglobin has dropped to 11.0. Blood pressures are stable and within normal limits. Urology and IR were consulted for definitive management. Upon labs in our facility, hemoglobin decreased to 11 s/p blood transfusion and INR increased to 5. Per IR, they are uncertain whether they would take to IR suite at this time given history of anaphylaxis to contrast. Per urology, they recommend admit to medicine with urology to follow. They did not give recommendations regarding anticoagulation reversal. This being the case, will administer vitamin K IV and pre-treat for contrast allergy with solumedrol. Labs also reveal hyperkalemia of 5.7. No EKG changes. Administered calcium, insulin, and d50. Will hold kayexalate  given NPO status. Blood pressure remains stable. Admitting pt to medicine with urology on board. Awaiting IR plan.    As the supervising MD I agree with the above PE.    As the supervising MD I agree with the above treatment, course, plan, and disposition.          Physician Attestation for Scribe:      Comments: I, Dr. Burton Sánchez, personally performed the services described in this documentation. All medical record entries made by the scribe were at my direction and in my presence.  I have reviewed the chart and agree that the record reflects my personal performance and is accurate and complete. Burton Sánchez MD.  6:18 PM 07/13/2018                 Clinical Impression:   The primary encounter diagnosis was Perinephric hematoma. Diagnoses of Back pain, Hyperkalemia, and Lethargic were also pertinent to this visit.      Disposition:   Disposition: Admitted  Condition: Nik Marshall MD  Resident  07/13/18 1625       Burton Sánchez MD  07/13/18 1918